# Patient Record
Sex: MALE | Race: BLACK OR AFRICAN AMERICAN | Employment: OTHER | URBAN - METROPOLITAN AREA
[De-identification: names, ages, dates, MRNs, and addresses within clinical notes are randomized per-mention and may not be internally consistent; named-entity substitution may affect disease eponyms.]

---

## 2017-09-19 ENCOUNTER — ED HISTORICAL/CONVERTED ENCOUNTER (OUTPATIENT)
Dept: OTHER | Age: 52
End: 2017-09-19

## 2019-02-18 ENCOUNTER — ED HISTORICAL/CONVERTED ENCOUNTER (OUTPATIENT)
Dept: OTHER | Age: 54
End: 2019-02-18

## 2019-05-23 ENCOUNTER — ED HISTORICAL/CONVERTED ENCOUNTER (OUTPATIENT)
Dept: OTHER | Age: 54
End: 2019-05-23

## 2019-06-14 ENCOUNTER — OP HISTORICAL/CONVERTED ENCOUNTER (OUTPATIENT)
Dept: OTHER | Age: 54
End: 2019-06-14

## 2019-06-18 ENCOUNTER — OP HISTORICAL/CONVERTED ENCOUNTER (OUTPATIENT)
Dept: OTHER | Age: 54
End: 2019-06-18

## 2020-02-04 ENCOUNTER — IP HISTORICAL/CONVERTED ENCOUNTER (OUTPATIENT)
Dept: OTHER | Age: 55
End: 2020-02-04

## 2020-04-04 ENCOUNTER — ED HISTORICAL/CONVERTED ENCOUNTER (OUTPATIENT)
Dept: OTHER | Age: 55
End: 2020-04-04

## 2020-06-17 LAB — HBA1C MFR BLD HPLC: 6.8 %

## 2020-07-04 ENCOUNTER — ED HISTORICAL/CONVERTED ENCOUNTER (OUTPATIENT)
Dept: OTHER | Age: 55
End: 2020-07-04

## 2020-07-04 LAB — CREATININE, EXTERNAL: 1.29

## 2020-07-15 VITALS
SYSTOLIC BLOOD PRESSURE: 142 MMHG | HEART RATE: 68 BPM | HEIGHT: 69 IN | OXYGEN SATURATION: 100 % | RESPIRATION RATE: 18 BRPM | BODY MASS INDEX: 36.14 KG/M2 | WEIGHT: 244 LBS | DIASTOLIC BLOOD PRESSURE: 90 MMHG | TEMPERATURE: 97.5 F

## 2020-07-15 PROBLEM — I70.209 ATHEROSCLEROSIS OF ARTERIES OF EXTREMITIES (HCC): Status: ACTIVE | Noted: 2020-07-15

## 2020-07-15 PROBLEM — R74.8 ELEVATED LIVER ENZYMES: Status: ACTIVE | Noted: 2020-07-15

## 2020-07-15 PROBLEM — E78.5 HYPERLIPIDEMIA: Status: ACTIVE | Noted: 2020-07-15

## 2020-07-15 PROBLEM — H26.9 CATARACT: Status: ACTIVE | Noted: 2020-07-15

## 2020-07-15 PROBLEM — M62.830 SPASM OF BACK MUSCLES: Status: ACTIVE | Noted: 2020-07-15

## 2020-07-15 PROBLEM — M79.661 PAIN IN BOTH LOWER LEGS: Status: ACTIVE | Noted: 2020-07-15

## 2020-07-15 PROBLEM — I25.10 CORONARY ARTERIOSCLEROSIS IN NATIVE ARTERY: Status: ACTIVE | Noted: 2020-07-15

## 2020-07-15 PROBLEM — E11.42 DIABETIC PERIPHERAL NEUROPATHY (HCC): Status: ACTIVE | Noted: 2020-07-15

## 2020-07-15 PROBLEM — I10 ESSENTIAL HYPERTENSION: Status: ACTIVE | Noted: 2020-07-15

## 2020-07-15 PROBLEM — E11.51 PERIPHERAL CIRCULATORY DISORDER ASSOCIATED WITH TYPE 2 DIABETES MELLITUS (HCC): Status: ACTIVE | Noted: 2020-07-15

## 2020-07-15 PROBLEM — M79.662 PAIN IN BOTH LOWER LEGS: Status: ACTIVE | Noted: 2020-07-15

## 2020-07-15 PROBLEM — R94.31 EKG, ABNORMAL: Status: ACTIVE | Noted: 2020-07-15

## 2020-07-15 PROBLEM — E11.41 DIABETIC MONONEUROPATHY ASSOCIATED WITH TYPE 2 DIABETES MELLITUS (HCC): Status: ACTIVE | Noted: 2020-07-15

## 2020-07-15 PROBLEM — L97.509 ULCER OF FOOT (HCC): Status: ACTIVE | Noted: 2020-07-15

## 2020-07-15 PROBLEM — E11.9 DIABETES MELLITUS TYPE 2, CONTROLLED (HCC): Status: ACTIVE | Noted: 2020-07-15

## 2020-07-15 PROBLEM — N52.9 ERECTILE DYSFUNCTION: Status: ACTIVE | Noted: 2020-07-15

## 2020-07-15 PROBLEM — I70.269: Status: ACTIVE | Noted: 2020-07-15

## 2020-07-15 PROBLEM — I77.9 PERIPHERAL ARTERIAL OCCLUSIVE DISEASE (HCC): Status: ACTIVE | Noted: 2020-07-15

## 2020-07-15 RX ORDER — ACETAMINOPHEN 160 MG/5ML
SUSPENSION, ORAL (FINAL DOSE FORM) ORAL
COMMUNITY
End: 2021-04-29 | Stop reason: ALTCHOICE

## 2020-07-15 RX ORDER — BLOOD SUGAR DIAGNOSTIC
STRIP MISCELLANEOUS
COMMUNITY
End: 2021-12-14 | Stop reason: SDUPTHER

## 2020-07-15 RX ORDER — LOSARTAN POTASSIUM 100 MG/1
100 TABLET ORAL DAILY
COMMUNITY
End: 2020-08-19 | Stop reason: SDUPTHER

## 2020-07-15 RX ORDER — INSULIN PUMP SYRINGE, 3 ML
EACH MISCELLANEOUS
COMMUNITY
End: 2020-08-10 | Stop reason: ALTCHOICE

## 2020-07-15 RX ORDER — PEN NEEDLE, DIABETIC 31 GX3/16"
NEEDLE, DISPOSABLE MISCELLANEOUS
COMMUNITY
End: 2020-08-25 | Stop reason: SDUPTHER

## 2020-07-15 RX ORDER — ISOPROPYL ALCOHOL 70 ML/100ML
SWAB TOPICAL
COMMUNITY
End: 2020-10-08 | Stop reason: ALTCHOICE

## 2020-07-15 RX ORDER — LABETALOL 100 MG/1
TABLET, FILM COATED ORAL 2 TIMES DAILY
COMMUNITY
End: 2020-09-18 | Stop reason: SDUPTHER

## 2020-07-15 RX ORDER — CLOPIDOGREL BISULFATE 75 MG/1
TABLET ORAL
COMMUNITY
End: 2020-08-31

## 2020-07-15 RX ORDER — KETOROLAC TROMETHAMINE 5 MG/ML
1 SOLUTION OPHTHALMIC 4 TIMES DAILY
COMMUNITY
End: 2020-10-08 | Stop reason: ALTCHOICE

## 2020-07-15 RX ORDER — PEN NEEDLE, DIABETIC 29 G X1/2"
NEEDLE, DISPOSABLE MISCELLANEOUS
COMMUNITY
End: 2020-08-25 | Stop reason: SDUPTHER

## 2020-07-15 RX ORDER — OFLOXACIN 3 MG/ML
3 SOLUTION/ DROPS OPHTHALMIC 4 TIMES DAILY
COMMUNITY
End: 2020-10-08 | Stop reason: ALTCHOICE

## 2020-07-15 RX ORDER — LANCETS 28 GAUGE
EACH MISCELLANEOUS
Status: ON HOLD | COMMUNITY
End: 2022-01-21 | Stop reason: SDUPTHER

## 2020-07-15 RX ORDER — BLOOD SUGAR DIAGNOSTIC
STRIP MISCELLANEOUS SEE ADMIN INSTRUCTIONS
COMMUNITY
End: 2020-08-10 | Stop reason: ALTCHOICE

## 2020-07-15 RX ORDER — LIDOCAINE 50 MG/G
PATCH TOPICAL EVERY 24 HOURS
COMMUNITY
End: 2020-10-08 | Stop reason: SDUPTHER

## 2020-07-15 RX ORDER — INSULIN GLARGINE 100 [IU]/ML
INJECTION, SOLUTION SUBCUTANEOUS
COMMUNITY
End: 2020-08-25 | Stop reason: SDUPTHER

## 2020-07-15 RX ORDER — CAPSAICIN 0.1 %
CREAM (GRAM) TOPICAL 3 TIMES DAILY
COMMUNITY
End: 2020-10-29 | Stop reason: ALTCHOICE

## 2020-07-15 RX ORDER — ASPIRIN 81 MG/1
TABLET ORAL DAILY
COMMUNITY
End: 2020-10-08 | Stop reason: SDUPTHER

## 2020-07-15 RX ORDER — LIRAGLUTIDE 6 MG/ML
0.6 INJECTION SUBCUTANEOUS
COMMUNITY
End: 2020-08-28

## 2020-07-15 RX ORDER — MENTHOL 5 %
ADHESIVE PATCH, MEDICATED TOPICAL
COMMUNITY
End: 2021-04-29 | Stop reason: ALTCHOICE

## 2020-07-15 RX ORDER — SILDENAFIL CITRATE 20 MG/1
20 TABLET ORAL 3 TIMES DAILY
COMMUNITY
End: 2021-05-27

## 2020-07-15 RX ORDER — OMEGA-3-ACID ETHYL ESTERS 1 G/1
2 CAPSULE, LIQUID FILLED ORAL 2 TIMES DAILY
COMMUNITY
End: 2020-12-31 | Stop reason: SDUPTHER

## 2020-07-15 RX ORDER — ESCITALOPRAM OXALATE 5 MG/1
TABLET ORAL DAILY
COMMUNITY
End: 2020-07-27 | Stop reason: DRUGHIGH

## 2020-07-15 RX ORDER — ATORVASTATIN CALCIUM 40 MG/1
TABLET, FILM COATED ORAL DAILY
COMMUNITY
End: 2020-09-18 | Stop reason: SDUPTHER

## 2020-07-27 ENCOUNTER — OFFICE VISIT (OUTPATIENT)
Dept: INTERNAL MEDICINE CLINIC | Age: 55
End: 2020-07-27
Payer: MEDICARE

## 2020-07-27 VITALS
OXYGEN SATURATION: 96 % | BODY MASS INDEX: 35.43 KG/M2 | DIASTOLIC BLOOD PRESSURE: 84 MMHG | WEIGHT: 239.2 LBS | HEART RATE: 71 BPM | HEIGHT: 69 IN | TEMPERATURE: 97.2 F | SYSTOLIC BLOOD PRESSURE: 127 MMHG

## 2020-07-27 DIAGNOSIS — M21.372 ACQUIRED LEFT FOOT DROP: ICD-10-CM

## 2020-07-27 DIAGNOSIS — E11.42 DIABETIC PERIPHERAL NEUROPATHY (HCC): ICD-10-CM

## 2020-07-27 DIAGNOSIS — E11.51 PERIPHERAL CIRCULATORY DISORDER ASSOCIATED WITH TYPE 2 DIABETES MELLITUS (HCC): ICD-10-CM

## 2020-07-27 DIAGNOSIS — F33.2 SEVERE EPISODE OF RECURRENT MAJOR DEPRESSIVE DISORDER, WITHOUT PSYCHOTIC FEATURES (HCC): ICD-10-CM

## 2020-07-27 DIAGNOSIS — E66.01 SEVERE OBESITY (HCC): ICD-10-CM

## 2020-07-27 DIAGNOSIS — I77.9 PERIPHERAL ARTERIAL OCCLUSIVE DISEASE (HCC): Primary | ICD-10-CM

## 2020-07-27 DIAGNOSIS — I10 ESSENTIAL HYPERTENSION: ICD-10-CM

## 2020-07-27 PROCEDURE — 99214 OFFICE O/P EST MOD 30 MIN: CPT | Performed by: INTERNAL MEDICINE

## 2020-07-27 RX ORDER — HYDROCHLOROTHIAZIDE 25 MG/1
25 TABLET ORAL DAILY
COMMUNITY
End: 2020-09-18 | Stop reason: SDUPTHER

## 2020-07-27 RX ORDER — ESCITALOPRAM OXALATE 10 MG/1
10 TABLET ORAL DAILY
Qty: 90 TAB | Refills: 0 | Status: SHIPPED | OUTPATIENT
Start: 2020-07-27 | End: 2020-10-08 | Stop reason: SDUPTHER

## 2020-07-27 NOTE — PATIENT INSTRUCTIONS
Online counseling: [employer]       Depression and Chronic Disease: Care Instructions  Your Care Instructions     A chronic disease is one that you have for a long time. Some chronic diseases can be controlled, but they usually cannot be cured. Depression is common in people with chronic diseases, but it often goes unnoticed. Many people have concerns about seeking treatment for a mental health problem. You may think it's a sign of weakness, or you don't want people to know about it. It's important to overcome these reasons for not seeking treatment. Treating depression or anxiety is good for your health. Follow-up care is a key part of your treatment and safety. Be sure to make and go to all appointments, and call your doctor if you are having problems. It's also a good idea to know your test results and keep a list of the medicines you take. How can you care for yourself at home? Watch for symptoms of depression  The symptoms of depression are often subtle at first. You may think they are caused by your disease rather than depression. Or you may think it is normal to be depressed when you have a chronic disease. If you are depressed you may:  · Feel sad or hopeless. · Feel guilty or worthless. · Not enjoy the things you used to enjoy. · Feel hopeless, as though life is not worth living. · Have trouble thinking or remembering. · Have low energy, and you may not eat or sleep well. · Pull away from others. · Think often about death or killing yourself. (Keep the numbers for these national suicide hotlines: 0-414-671-TALK [1-101.368.2548] and 5-470-UEOHBOH [1-442.142.6954]. )  Get treatment  By treating your depression, you can feel more hopeful and have more energy. If you feel better, you may take better care of yourself, so your health may improve. · Talk to your doctor if you have any changes in mood during treatment for your disease. · Ask your doctor for help.  Counseling, antidepressant medicine, or a combination of the two can help most people with depression. Often a combination works best. Counseling can also help you cope with having a chronic disease. When should you call for help? [id number] anytime you think you may need emergency care. For example, call if:  · You feel like hurting yourself or someone else. · Someone you know has depression and is about to attempt or is attempting suicide. Call your doctor now or seek immediate medical care if:  · You hear voices. · Someone you know has depression and:  ? Starts to give away his or her possessions. ? Uses illegal drugs or drinks alcohol heavily. ? Talks or writes about death, including writing suicide notes or talking about guns, knives, or pills. ? Starts to spend a lot of time alone. ? Acts very aggressively or suddenly appears calm. Watch closely for changes in your health, and be sure to contact your doctor if:  · You do not get better as expected. Where can you learn more? Go to http://danisha-nato.info/  Enter A548 in the search box to learn more about \"Depression and Chronic Disease: Care Instructions. \"  Current as of: January 31, 2020               Content Version: 12.5  © 2841-8638 Healthwise, [employer]. Care instructions adapted under license by [employer] (which disclaims liability or warranty for this information). If you have questions about a medical condition or this instruction, always ask your healthcare professional. [place] [address] any warranty or liability for your use of this information.

## 2020-07-27 NOTE — PROGRESS NOTES
Rajni Payan is a 62 y.o. male and presents with Diabetes; Depression; and Hypertension    Mr. Antonia Potter is here mainly today for evaluation of mobility required for him to be approved for a scooter to use at home, I have sent an order based on what he tells me he has difficulty with, he says that he is getting worse, he went to Neurology, but he was told there was not much alternatives to offer him. He says he does not feel his feet, he has to wear his shoes all the time inside the house, and it's difficult for him to walk from his bed to the bathroom or to the kitchen despite of using the walker because of the progressive lack of sensation in his feet and the severe pain he's on. He says lately he has opted for laying down most of the day because of pain and because he's scared of falling and he wants to be able to move around, be functional and do his ADLs. He says the right leg is the worse, the righ foot is numb all the time. He feels frequently a stabbing pain, has cramps often. He's using hsi rollator to help himself, sometimes he holds into the walls. He says he has to reschedule appointment with interventional pain management, he was seeing a specialist at [facility]. He saw them once before pandemic started. He does not want medications, he wants interventional pain management    For diabetes we will check A1c last month 6.8%, he has started efforts to lose weight again, since our last visit in June he has lost 5 pounds, we decided not to modify his medication treatment but to encourage him make efforts to lose weight so that we can get to a goal of 6.5%. For now neuropathy giving him all the symptoms above, he has tried gabapentin and pregabalin but he has not tolerated the side effects of either of them. He has seen physical medicine and rehabilitation neurology for this problem as well. There is no improvement.   He is currently on Victoza 1.8 mg/day, Jardiance 25 mg/day, Lantus 50 units at night, and Novolin and sliding scale 2:50:150. Today morning was 122, oscilating between 100 and 150. His blood pressure is well controlled at goal, taking his medications as indicated listed in the medication tab. For the peripheral vascular disease he is on dual antiplatelet, denies any bleeding, no active lesions or ulcers. He has intermittent claudication, he had right lower extremity stent in 2019. He has been recently hospitalized twice in [place] at [facility] for depression with suicidal ideation, is currently on Lexapro 5 mg/day. He decided to go on his own because of suicidal thoughts but no intent. He was using cocaine and he stopped back then in April. He feels Lexapro helps. He has not seen a psychiatrist        Review of Systems  Review of Systems   Constitutional: Negative for chills, fatigue, fever and unexpected weight change. HENT: Negative for congestion, ear pain, sneezing and sore throat. Eyes: Negative for pain and discharge. Respiratory: Negative for cough, shortness of breath and wheezing. Cardiovascular: Negative for chest pain, palpitations and leg swelling. Gastrointestinal: Negative for abdominal pain, blood in stool, constipation and diarrhea. Endocrine: Negative for polydipsia and polyuria. Genitourinary: Negative for difficulty urinating, dysuria, frequency, hematuria and urgency. Musculoskeletal: Positive for arthralgias, gait problem and myalgias. Negative for back pain and joint swelling. Skin: Negative for rash. Allergic/Immunologic: Negative for environmental allergies and food allergies. Neurological: Positive for weakness (Left foot drop) and numbness. Negative for dizziness, speech difficulty, light-headedness and headaches. Hematological: Negative for adenopathy. Psychiatric/Behavioral: Positive for behavioral problems (Depression).  Negative for self-injury, sleep disturbance and suicidal ideas. Past Medical History:   Diagnosis Date    Arteriosclerotic gangrene ([facility] 7/15/2020    Atherosclerosis of arteries of extremities ([facility] 7/15/2020    Cataract 7/15/2020    Coronary arteriosclerosis in native artery 7/15/2020    Diabetes mellitus type 2, controlled ([address] 7/15/2020    Diabetic mononeuropathy associated with type 2 diabetes mellitus ([address] 7/15/2020    Diabetic peripheral neuropathy ([facility] 7/15/2020    EKG, abnormal 7/15/2020    Elevated liver enzymes 7/15/2020    Erectile dysfunction 7/15/2020    Essential hypertension 7/15/2020    Hepatitis     High cholesterol     Hyperlipidemia 7/15/2020    Hypertension     Pain in both lower legs 7/15/2020    Peripheral arterial occlusive disease ([facility] 7/15/2020    Peripheral circulatory disorder associated with type 2 diabetes mellitus ([facility] 7/15/2020    Spasm of back muscles 7/15/2020    Ulcer of foot ([facility] 7/15/2020     Past Surgical History:   Procedure Laterality Date    IR STENT PLACEMENT      Right Leg     Social History     Socioeconomic History    Marital status: SINGLE     Spouse name: Not on file    Number of children: Not on file    Years of education: Not on file    Highest education level: Not on file   Tobacco Use    Smoking status: Never Smoker    Smokeless tobacco: Never Used   Substance and Sexual Activity    Alcohol use: Never     Frequency: Never     Family History   Problem Relation Age of Onset    Diabetes Mother     Heart Disease Mother     Diabetes Father     Diabetes Sister     Diabetes Brother      Current Outpatient Medications   Medication Sig Dispense Refill    hydroCHLOROthiazide (HYDRODIURIL) 25 mg tablet Take 25 mg by mouth daily.  escitalopram oxalate (LEXAPRO) 10 mg tablet Take 1 Tab by mouth daily for 90 days. Indications: major depressive disorder 90 Tab 0    atorvastatin (LIPITOR) 40 mg tablet Take  by mouth daily.  alcohol swabs padm by Apply Externally route.       clopidogreL (PLAVIX) 75 mg tab Take  by mouth.  Insulin Syringe-Needle U-100 0.3 mL 30 gauge x 5/16\" syrg by Does Not Apply route.  labetaloL (NORMODYNE) 100 mg tablet Take  by mouth two (2) times a day.  insulin glargine (Lantus Solostar U-100 Insulin) 100 unit/mL (3 mL) inpn by SubCUTAneous route.  lidocaine (LIDODERM) 5 % by TransDERmal route every twenty-four (24) hours. Apply patch to the affected area for 12 hours a day and remove for 12 hours a day.  losartan (COZAAR) 100 mg tablet Take 100 mg by mouth daily.  aspirin delayed-release (Aspirin Low Dose) 81 mg tablet Take  by mouth daily.  insulin regular (NovoLIN R Regular U-100 Insuln) 100 unit/mL injection by SubCUTAneous route.  omega-3 acid ethyl esters (LOVAZA) 1 gram capsule Take 2 g by mouth two (2) times a day.  Insulin Needles, Disposable, (Pen Needle) 32 gauge x 5/32\" ndle by Does Not Apply route.  Blood-Glucose Meter (Prodigy Autocode Meter) monitoring kit by Does Not Apply route.  glucose blood VI test strips (Prodigy No Coding) strip by Does Not Apply route See Admin Instructions.  lancets (Prodigy Twist Top Lancet) 28 gauge misc by Does Not Apply route.  sildenafiL, pulmonary hypertension, (REVATIO) 20 mg tablet Take 20 mg by mouth three (3) times daily.  Insulin Syringe-Needle U-100 (UltiCare) 0.3 mL 30 gauge x 1/2\" syrg by Does Not Apply route.  Insulin Needles, Disposable, (Ultracare Pen Needle) 32 gauge x 1/4\" ndle by Does Not Apply route.  liraglutide (Victoza 3-Alfredo) 0.6 mg/0.1 mL (18 mg/3 mL) pnij 0.6 mg by SubCUTAneous route.  blood pressure test kit (WRIST BLOOD PRESSURE MONITOR) by Does Not Apply route.  capsaicin (CAPZASIN-HP) 0.1 % topical cream Apply  to affected area three (3) times daily.  menthol (Cold and Hot Pain Relief) 5 % ptmd by Apply Externally route.  empagliflozin (Jardiance) 25 mg tablet Take  by mouth daily.       ketorolac (ACULAR) 0.5 % ophthalmic solution Administer 1 Drop to both eyes four (4) times daily.  benzocaine (HURRICANE) 20 % spra by Mucous Membrane route as needed.  ofloxacin (FLOXIN) 0.3 % ophthalmic solution Administer 3 Drops to both eyes four (4) times daily.  capsaicin (MUSCLE RELIEF EX) by Apply Externally route. No Known Allergies    Objective:  Visit Vitals  /84 (BP 1 Location: Right arm, BP Patient Position: Sitting)   Pulse 71   Temp 97.2 °F (36.2 °C) (Oral)   Ht 5' 9\" (1.753 m) Comment: Standing with colthes   Wt 239 lb 3.2 oz (108.5 kg)   SpO2 96% Comment: RA   BMI 35.32 kg/m²     Physical Exam:   Physical Exam  Vitals signs reviewed. Constitutional:       General: He is not in acute distress. Appearance: Normal appearance. He is obese. HENT:      Head: Normocephalic and atraumatic. Mouth/Throat:      Mouth: Mucous membranes are moist.   Eyes:      Extraocular Movements: Extraocular movements intact. Conjunctiva/sclera: Conjunctivae normal.      Pupils: Pupils are equal, round, and reactive to light. Neck:      Musculoskeletal: Normal range of motion and neck supple. Cardiovascular:      Rate and Rhythm: Normal rate and regular rhythm. Pulses: Normal pulses. Heart sounds: Normal heart sounds. Pulmonary:      Effort: Pulmonary effort is normal.      Breath sounds: Normal breath sounds. Abdominal:      General: Abdomen is flat. Bowel sounds are normal. There is no distension. Palpations: Abdomen is soft. There is no mass. Tenderness: There is no abdominal tenderness. Musculoskeletal:         General: Deformity (Left foot drop wearing brace) present. No swelling. Right lower leg: No edema. Left lower leg: No edema. Lymphadenopathy:      Cervical: No cervical adenopathy. Skin:     General: Skin is warm and dry. Capillary Refill: Capillary refill takes less than 2 seconds.       Coloration: Skin is not jaundiced or pale. Findings: No erythema or rash. Neurological:      Mental Status: He is alert and oriented to person, place, and time. Cranial Nerves: No cranial nerve deficit. Sensory: Sensory deficit (Decreased sensation to both light and deep stimulation in both lower extremities, worse in the left side.) present. Motor: Weakness (Left lower extremity, with limp and gait instability) present. Coordination: Coordination normal.      Gait: Gait abnormal (Left limp, wide-based, slow and tandem walk, without a walker has to grab the walls to keep himself stable). Deep Tendon Reflexes: Reflexes abnormal (1+ patellar left side). Psychiatric:         Mood and Affect: Mood normal.         Behavior: Behavior normal.         Thought Content: Thought content normal.         Judgment: Judgment normal.          Results for orders placed or performed in visit on 07/15/20   AMB EXT HGBA1C   Result Value Ref Range    Hemoglobin A1c, External 6.8        Assessment/Plan:    He has significant limitation for mobility secondary to peripheral arterial occlusive disease, peripheral neuropathy, left foot drop, which has significantly impacted his quality of life, has reduced his capacity of fulfill his ADLs as expected, he has opted for laying in bed more often now due to the difficulty moving around even with his Rollator. He is waiting for appointment with his neuropathy clinic to start interventional pain management treatment, he does not want any medications, I am ordering scooter again now that I was able to see him in the office and examine him. I believe been able to mobilize with the scooter is going to help him fulfill his ADLs like cooking at home or reaching the bathroom on time and be able to be more mobile around. His diabetes seems better controlled, will make any changes, continue Lantus 50 units at night, Jardiance and Victoza with no changes, and Humalog on sliding scale 2: 50: 150.   No need for labs right now last hemoglobin A1c a month ago as mentioned above. He has lost 5 pounds in the last visit encouraged to continue weight loss efforts which I explained him will help also with in some degree decreasing his pain and help with mobility  Hypertension is well controlled continue treatment without changes  He is depressed, no suicidal thoughts right now, increase Lexapro from 5 mg to 10 mg, he needs to see psychiatry and counseling. Referrals and recommendations given. ICD-10-CM ICD-9-CM    1. Peripheral arterial occlusive disease (HCC)  I77.9 444.22 AMB SUPPLY ORDER   2. Severe obesity ([facility]  E66.01 278.01    3. Peripheral circulatory disorder associated with type 2 diabetes mellitus (HCC)  E11.51 250.70 AMB SUPPLY ORDER   4. Diabetic peripheral neuropathy (HCC)  E11.42 250.60 AMB SUPPLY ORDER     357.2    5. Essential hypertension  I10 401.9    6. Severe episode of recurrent major depressive disorder, without psychotic features ([facility]  F33.2 296.33 REFERRAL TO PSYCHIATRY      escitalopram oxalate (LEXAPRO) 10 mg tablet   7. Acquired left foot drop  M21.372 736.79 AMB SUPPLY ORDER     Orders Placed This Encounter    AMB SUPPLY ORDER     Electrical scooter     Scheduling Instructions:      [facility]: [address] [place] [address], [address] [place] [address], Ph [phone], Fax [phone] TO PSYCHIATRY     Referral Priority:   Routine     Referral Type:   Behavioral Health     Referral Reason:   Specialty Services Required     Referred to Provider:   Marianela Blackburn MD     Requested Specialty:   Psychiatry     Number of Visits Requested:   1    hydroCHLOROthiazide (HYDRODIURIL) 25 mg tablet     Sig: Take 25 mg by mouth daily.  escitalopram oxalate (LEXAPRO) 10 mg tablet     Sig: Take 1 Tab by mouth daily for 90 days.  Indications: major depressive disorder     Dispense:  90 Tab     Refill:  0     lose weight, follow low fat diet, follow low salt diet  Patient Instructions Online counseling: [facility]       Depression and Chronic Disease: Care Instructions  Your Care Instructions     A chronic disease is one that you have for a long time. Some chronic diseases can be controlled, but they usually cannot be cured. Depression is common in people with chronic diseases, but it often goes unnoticed. Many people have concerns about seeking treatment for a mental health problem. You may think it's a sign of weakness, or you don't want people to know about it. It's important to overcome these reasons for not seeking treatment. Treating depression or anxiety is good for your health. Follow-up care is a key part of your treatment and safety. Be sure to make and go to all appointments, and call your doctor if you are having problems. It's also a good idea to know your test results and keep a list of the medicines you take. How can you care for yourself at home? Watch for symptoms of depression  The symptoms of depression are often subtle at first. You may think they are caused by your disease rather than depression. Or you may think it is normal to be depressed when you have a chronic disease. If you are depressed you may:  · Feel sad or hopeless. · Feel guilty or worthless. · Not enjoy the things you used to enjoy. · Feel hopeless, as though life is not worth living. · Have trouble thinking or remembering. · Have low energy, and you may not eat or sleep well. · Pull away from others. · Think often about death or killing yourself. (Keep the numbers for these national suicide hotlines: 3-275-868-TALK [1-371.427.4677] and 6-806-AKUOZZH [1-384.890.4804]. )  Get treatment  By treating your depression, you can feel more hopeful and have more energy. If you feel better, you may take better care of yourself, so your health may improve. · Talk to your doctor if you have any changes in mood during treatment for your disease. · Ask your doctor for help.  Counseling, antidepressant medicine, or a combination of the two can help most people with depression. Often a combination works best. Counseling can also help you cope with having a chronic disease. When should you call for help? [id number] anytime you think you may need emergency care. For example, call if:  · You feel like hurting yourself or someone else. · Someone you know has depression and is about to attempt or is attempting suicide. Call your doctor now or seek immediate medical care if:  · You hear voices. · Someone you know has depression and:  ? Starts to give away his or her possessions. ? Uses illegal drugs or drinks alcohol heavily. ? Talks or writes about death, including writing suicide notes or talking about guns, knives, or pills. ? Starts to spend a lot of time alone. ? Acts very aggressively or suddenly appears calm. Watch closely for changes in your health, and be sure to contact your doctor if:  · You do not get better as expected. Where can you learn more? Go to http://danisha-nato.info/  Enter A548 in the search box to learn more about \"Depression and Chronic Disease: Care Instructions. \"  Current as of: January 31, 2020               Content Version: 12.5  © 5607-4962 Healthwise, [employer]. Care instructions adapted under license by [employer]. (which disclaims liability or warranty for this information). If you have questions about a medical condition or this instruction, always ask your healthcare professional. [place] [address] any warranty or liability for your use of this information. Follow-up and Dispositions    · Return in about 2 months (around 9/27/2020), or Diabetes, HTN, neuropathy, for follow up 30 minutes .      [facility] to [facility], need to find the name from them of what they cover

## 2020-07-31 VITALS
TEMPERATURE: 97.5 F | HEIGHT: 69 IN | BODY MASS INDEX: 36.14 KG/M2 | HEART RATE: 68 BPM | SYSTOLIC BLOOD PRESSURE: 142 MMHG | WEIGHT: 244 LBS | DIASTOLIC BLOOD PRESSURE: 90 MMHG | OXYGEN SATURATION: 100 %

## 2020-08-10 ENCOUNTER — TELEPHONE (OUTPATIENT)
Dept: INTERNAL MEDICINE CLINIC | Age: 55
End: 2020-08-10

## 2020-08-10 DIAGNOSIS — E11.42 CONTROLLED TYPE 2 DIABETES MELLITUS WITH DIABETIC POLYNEUROPATHY, WITH LONG-TERM CURRENT USE OF INSULIN (HCC): Primary | ICD-10-CM

## 2020-08-10 DIAGNOSIS — Z79.4 CONTROLLED TYPE 2 DIABETES MELLITUS WITH DIABETIC POLYNEUROPATHY, WITH LONG-TERM CURRENT USE OF INSULIN (HCC): Primary | ICD-10-CM

## 2020-08-10 RX ORDER — BLOOD-GLUCOSE METER
EACH MISCELLANEOUS
Qty: 100 STRIP | Refills: 11 | Status: SHIPPED | OUTPATIENT
Start: 2020-08-10 | End: 2020-11-05

## 2020-08-10 RX ORDER — ISOPROPYL ALCOHOL 70 ML/100ML
SWAB TOPICAL
Qty: 100 PAD | Refills: 11 | Status: SHIPPED | OUTPATIENT
Start: 2020-08-10 | End: 2021-02-22 | Stop reason: SDUPTHER

## 2020-08-10 RX ORDER — BLOOD-GLUC,BP METER,ADULT CUFF
EACH MISCELLANEOUS
Qty: 1 DOSE PACK | Refills: 11 | Status: SHIPPED | OUTPATIENT
Start: 2020-08-10 | End: 2022-01-21

## 2020-08-10 RX ORDER — INSULIN PUMP SYRINGE, 3 ML
EACH MISCELLANEOUS
Qty: 1 KIT | Refills: 0 | Status: SHIPPED | OUTPATIENT
Start: 2020-08-10 | End: 2021-06-17

## 2020-08-10 NOTE — TELEPHONE ENCOUNTER
Radhames called the insurance company and they said that the talking meter is called Dwight Soto this is a talking meter. Can you order this, his strips, lancets, control solution, battery, alcohol swabs.

## 2020-08-10 NOTE — TELEPHONE ENCOUNTER
ICD-10-CM ICD-9-CM    1.  Controlled type 2 diabetes mellitus with diabetic polyneuropathy, with long-term current use of insulin ([facility])  E11.42 250.60 Blood-Glucose Meter (Fora V12 Blood Glucose System) monitoring kit    Z79.4 357.2 glucose blood VI test strips (Fora V12 Glucose) strip     V58.67 lancets-blood glucose strips ([employer]a [id number] strp-lnct) 30 gauge cmpk      alcohol swabs (Alcohol Pads) padm

## 2020-08-11 ENCOUNTER — OFFICE VISIT (OUTPATIENT)
Dept: PODIATRY | Age: 55
End: 2020-08-11
Payer: MEDICARE

## 2020-08-11 VITALS
BODY MASS INDEX: 35.34 KG/M2 | HEIGHT: 69 IN | TEMPERATURE: 97.5 F | WEIGHT: 238.6 LBS | OXYGEN SATURATION: 95 % | SYSTOLIC BLOOD PRESSURE: 121 MMHG | HEART RATE: 72 BPM | DIASTOLIC BLOOD PRESSURE: 78 MMHG

## 2020-08-11 DIAGNOSIS — Z79.4 CONTROLLED TYPE 2 DIABETES MELLITUS WITH DIABETIC POLYNEUROPATHY, WITH LONG-TERM CURRENT USE OF INSULIN (HCC): Primary | ICD-10-CM

## 2020-08-11 DIAGNOSIS — E11.42 DIABETIC PERIPHERAL NEUROPATHY (HCC): ICD-10-CM

## 2020-08-11 DIAGNOSIS — M72.2 PLANTAR FASCIITIS OF RIGHT FOOT: ICD-10-CM

## 2020-08-11 DIAGNOSIS — B35.1 ONYCHOMYCOSIS: ICD-10-CM

## 2020-08-11 DIAGNOSIS — E11.42 CONTROLLED TYPE 2 DIABETES MELLITUS WITH DIABETIC POLYNEUROPATHY, WITH LONG-TERM CURRENT USE OF INSULIN (HCC): Primary | ICD-10-CM

## 2020-08-11 PROCEDURE — 20550 NJX 1 TENDON SHEATH/LIGAMENT: CPT | Performed by: PODIATRIST

## 2020-08-18 ENCOUNTER — TELEPHONE (OUTPATIENT)
Dept: INTERNAL MEDICINE CLINIC | Age: 55
End: 2020-08-18

## 2020-08-18 DIAGNOSIS — I10 ESSENTIAL HYPERTENSION: Primary | ICD-10-CM

## 2020-08-18 NOTE — TELEPHONE ENCOUNTER
Patient called to get a refill on your Losartin Potassium 100mg sent to [address] [place] [address] in [place] - Patient is out.

## 2020-08-19 RX ORDER — BETAMETHASONE SODIUM PHOSPHATE AND BETAMETHASONE ACETATE 3; 3 MG/ML; MG/ML
500 INJECTION, SUSPENSION INTRA-ARTICULAR; INTRALESIONAL; INTRAMUSCULAR; SOFT TISSUE ONCE
Status: SHIPPED | OUTPATIENT
Start: 2020-08-19 | End: 2020-08-20

## 2020-08-19 RX ORDER — LOSARTAN POTASSIUM 100 MG/1
100 TABLET ORAL DAILY
Qty: 90 TAB | Refills: 2 | Status: SHIPPED | OUTPATIENT
Start: 2020-08-19 | End: 2020-09-18 | Stop reason: SDUPTHER

## 2020-08-19 RX ORDER — LIDOCAINE HYDROCHLORIDE 10 MG/ML
1 INJECTION INFILTRATION; PERINEURAL ONCE
Status: SHIPPED | OUTPATIENT
Start: 2020-08-19 | End: 2020-08-20

## 2020-08-19 NOTE — PROGRESS NOTES
HISTORY OF PRESENT ILLNESS  Alireza Arellano is a 62 y.o. male is being seen in the office as a returning patient needing a diabetic foot exam, complaining of thick/discolored toenails and complaining of heel pain. Patient states he has been a diabetic for many years his most recent hemoglobin A1c was 6.9. He states he has close follow-up with his PCP. He denies any recent trauma but states he is having right heel pain. He states the pain rises to the level of 6 out of 10 and is worse with his first step in the morning. Patient also states his toenails are thick/discolored but denies having testing performed to confirm a diagnosis. He denies any systemic signs of infection. He denies any other lower extremity complaints    Review of Systems   Constitutional: Negative. HENT: Negative. Eyes: Positive for blurred vision. Respiratory: Negative. Cardiovascular: Negative. Gastrointestinal: Negative. Genitourinary: Negative. Musculoskeletal: Positive for back pain. Skin: Negative. Neurological: Positive for sensory change. Endo/Heme/Allergies: Negative. Psychiatric/Behavioral: Negative. All other systems reviewed and are negative.     Past Medical History:   Diagnosis Date    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    Atherosclerosis of arteries of extremities ([address] 7/15/2020    Cataract 7/15/2020    Coronary arteriosclerosis in native artery 7/15/2020    Diabetes mellitus type 2, controlled ([address] 7/15/2020    Diabetic mononeuropathy associated with type 2 diabetes mellitus ([address] 7/15/2020    Diabetic peripheral neuropathy ([address] 7/15/2020    EKG, abnormal 7/15/2020    Elevated liver enzymes 7/15/2020    Erectile dysfunction 7/15/2020    Essential hypertension 7/15/2020    Hepatitis     High cholesterol     Hyperlipidemia 7/15/2020    Hypertension     Pain in both lower legs 7/15/2020    Peripheral arterial occlusive disease ([address] 7/15/2020    Peripheral circulatory disorder associated with type 2 diabetes mellitus ([email or web address] 7/15/2020    Spasm of back muscles 7/15/2020    Ulcer of foot ([facility] 7/15/2020     Family History   Problem Relation Age of Onset    Diabetes Mother     Heart Disease Mother     Diabetes Father     Diabetes Sister     Diabetes Brother      Past Surgical History:   Procedure Laterality Date    IR STENT PLACEMENT      Right Leg     Social History     Socioeconomic History    Marital status: SINGLE     Spouse name: Not on file    Number of children: Not on file    Years of education: Not on file    Highest education level: Not on file   Occupational History    Not on file   Social Needs    Financial resource strain: Not on file    Food insecurity     Worry: Not on file     Inability: Not on file    Transportation needs     Medical: Not on file     Non-medical: Not on file   Tobacco Use    Smoking status: Never Smoker    Smokeless tobacco: Never Used   Substance and Sexual Activity    Alcohol use: Never     Frequency: Never    Drug use: Not on file    Sexual activity: Not on file   Lifestyle    Physical activity     Days per week: Not on file     Minutes per session: Not on file    Stress: Not on file   Relationships    Social connections     Talks on phone: Not on file     Gets together: Not on file     Attends Synagogue service: Not on file     Active member of club or organization: Not on file     Attends meetings of clubs or organizations: Not on file     Relationship status: Not on file    Intimate partner violence     Fear of current or ex partner: Not on file     Emotionally abused: Not on file     Physically abused: Not on file     Forced sexual activity: Not on file   Other Topics Concern    Not on file   Social History Narrative    Not on file     Current Outpatient Medications   Medication Instructions    alcohol swabs (Alcohol Pads) padm Use to check glucose 3 times a day before meals    alcohol swabs padm Apply Externally  aspirin delayed-release (Aspirin Low Dose) 81 mg tablet Oral, DAILY    atorvastatin (LIPITOR) 40 mg tablet Oral, DAILY    benzocaine (HURRICANE) 20 % spra Mucous Membrane, AS NEEDED    blood pressure test kit (WRIST BLOOD PRESSURE MONITOR) Does Not Apply    Blood-Glucose Meter (Fora V12 Blood Glucose System) monitoring kit Use to check glucose 3 times a day before meals    capsaicin (CAPZASIN-HP) 0.1 % topical cream Topical, 3 TIMES DAILY    capsaicin (MUSCLE RELIEF EX) Apply Externally    clopidogreL (PLAVIX) 75 mg tab Oral    empagliflozin (Jardiance) 25 mg tablet Oral, DAILY    escitalopram oxalate (LEXAPRO) 10 mg, Oral, DAILY    glucose blood VI test strips (Fora V12 Glucose) strip Use to check glucose 3 times a day before meals    hydroCHLOROthiazide (HYDRODIURIL) 25 mg, Oral, DAILY    insulin glargine (Lantus Solostar U-100 Insulin) 100 unit/mL (3 mL) inpn SubCUTAneous    Insulin Needles, Disposable, (Pen Needle) 32 gauge x 5/32\" ndle Does Not Apply    Insulin Needles, Disposable, (Ultracare Pen Needle) 32 gauge x 1/4\" ndle Does Not Apply    insulin regular (NovoLIN R Regular U-100 Insuln) 100 unit/mL injection SubCUTAneous    Insulin Syringe-Needle U-100 (UltiCare) 0.3 mL 30 gauge x 1/2\" syrg Does Not Apply    Insulin Syringe-Needle U-100 0.3 mL 30 gauge x 5/16\" syrg Does Not Apply    ketorolac (ACULAR) 0.5 % ophthalmic solution 1 Drop, Both Eyes, 4 TIMES DAILY    labetaloL (NORMODYNE) 100 mg tablet Oral, 2 TIMES DAILY    lancets (Prodigy Twist Top Lancet) 28 gauge misc Does Not Apply    lancets-blood glucose strips (Fora [id number] strp-lnct) 30 gauge cmpk Use to check glucose 3 times a day before meals    lidocaine (LIDODERM) 5 % TransDERmal, EVERY 24 HOURS, Apply patch to the affected area for 12 hours a day and remove for 12 hours a day.      losartan (COZAAR) 100 mg, Oral, DAILY    menthol (Cold and Hot Pain Relief) 5 % ptmd Apply Externally    ofloxacin (FLOXIN) 0.3 % ophthalmic solution 3 Drops, Both Eyes, 4 TIMES DAILY    omega-3 acid ethyl esters (LOVAZA) 2 g, Oral, 2 TIMES DAILY    sildenafiL (pulmonary hypertension) (REVATIO) 20 mg, Oral, 3 TIMES DAILY    Victoza 3-Alfredo 0.6 mg, SubCUTAneous     No Known Allergies    Visit Vitals  /78 (BP 1 Location: Left arm, BP Patient Position: Sitting)   Pulse 72   Temp 97.5 °F (36.4 °C) (Temporal)   Ht 5' 9\" (1.753 m)   Wt 238 lb 9.6 oz (108.2 kg)   SpO2 95%   BMI 35.24 kg/m²     Physical Exam  Vitals signs reviewed. Constitutional:       Appearance: He is morbidly obese. HENT:      Head:      Comments: Normal dentition  Cardiovascular:      Pulses:           Dorsalis pedis pulses are 1+ on the right side and 1+ on the left side. Posterior tibial pulses are 1+ on the right side and 1+ on the left side. Pulmonary:      Effort: Pulmonary effort is normal.   Musculoskeletal:      Right ankle: He exhibits decreased range of motion. Achilles tendon exhibits no pain and no defect. Left ankle: He exhibits decreased range of motion. Achilles tendon exhibits no pain and no defect. Right lower leg: Edema present. Left lower leg: Edema present. Right foot: Normal range of motion. No deformity, bunion or Charcot foot. Left foot: Normal range of motion. No deformity, bunion or Charcot foot. Feet:      Right foot:      Protective Sensation: 10 sites tested. 10 sites sensed. Skin integrity: Skin integrity normal.      Toenail Condition: Right toenails are abnormally thick. Left foot:      Protective Sensation: 10 sites tested. 10 sites sensed. Skin integrity: Skin integrity normal.      Toenail Condition: Left toenails are abnormally thick. Skin:     General: Skin is warm. Capillary Refill: Capillary refill takes 2 to 3 seconds. Neurological:      Mental Status: He is alert and oriented to person, place, and time.    Psychiatric:         Mood and Affect: Mood and affect normal. Behavior: Behavior is cooperative. ASSESSMENT and PLAN    ICD-10-CM ICD-9-CM    1. Controlled type 2 diabetes mellitus with diabetic polyneuropathy, with long-term current use of insulin ([facility])  E11.42 250.60     Z79.4 357.2      V58.67    2. Diabetic peripheral neuropathy ([facility])  E11.42 250.60      357.2    3. Onychomycosis  B35.1 110.1    4. Plantar fasciitis of right foot  M72.2 728.71 lidocaine (XYLOCAINE) 10 mg/mL (1 %) injection 1 mL      betamethasone (CELESTONE) injection 500 mg       Discussed and educated patient regarding his current medical condition  Instructed patient to monitor their feet daily, be compliant with all medications and wear supportive shoe gear. Discussed conservative versus invasive treatment options for his right heel pain, patient elected for a trigger point injection today in the office. The injection was performed with 1 cc of lidocaine and 1 cc of betamethasone to the point of maximum tenderness to his right plantar heel.   Patient tolerated well and no dressing was needed  Patient to limit ambulation for the next few days and call the office immediately if condition worsens

## 2020-08-24 ENCOUNTER — TELEPHONE (OUTPATIENT)
Dept: INTERNAL MEDICINE CLINIC | Age: 55
End: 2020-08-24

## 2020-08-24 DIAGNOSIS — E11.42 CONTROLLED TYPE 2 DIABETES MELLITUS WITH DIABETIC POLYNEUROPATHY, WITH LONG-TERM CURRENT USE OF INSULIN (HCC): Primary | ICD-10-CM

## 2020-08-24 DIAGNOSIS — Z79.4 CONTROLLED TYPE 2 DIABETES MELLITUS WITH DIABETIC POLYNEUROPATHY, WITH LONG-TERM CURRENT USE OF INSULIN (HCC): Primary | ICD-10-CM

## 2020-08-24 NOTE — TELEPHONE ENCOUNTER
Patient called requesting a refill on his ALL his insulin (3) to be sent to [facility].

## 2020-08-25 RX ORDER — PEN NEEDLE, DIABETIC 29 G X1/2"
NEEDLE, DISPOSABLE MISCELLANEOUS
Qty: 100 SYRINGE | Refills: 11 | Status: ON HOLD | OUTPATIENT
Start: 2020-08-25 | End: 2022-01-21 | Stop reason: SDUPTHER

## 2020-08-25 RX ORDER — INSULIN GLARGINE 100 [IU]/ML
50 INJECTION, SOLUTION SUBCUTANEOUS
Qty: 45 ML | Refills: 1 | Status: SHIPPED | OUTPATIENT
Start: 2020-08-25 | End: 2020-09-18 | Stop reason: SDUPTHER

## 2020-08-25 RX ORDER — PEN NEEDLE, DIABETIC 31 GX3/16"
NEEDLE, DISPOSABLE MISCELLANEOUS
Qty: 50 PEN NEEDLE | Refills: 11 | Status: SHIPPED | OUTPATIENT
Start: 2020-08-25 | End: 2021-02-04 | Stop reason: SDUPTHER

## 2020-08-28 RX ORDER — LIRAGLUTIDE 6 MG/ML
INJECTION SUBCUTANEOUS
Qty: 9 ML | Refills: 3 | Status: SHIPPED | OUTPATIENT
Start: 2020-08-28 | End: 2020-09-18 | Stop reason: SDUPTHER

## 2020-09-18 ENCOUNTER — TELEPHONE (OUTPATIENT)
Dept: INTERNAL MEDICINE CLINIC | Age: 55
End: 2020-09-18

## 2020-09-18 DIAGNOSIS — Z79.4 CONTROLLED TYPE 2 DIABETES MELLITUS WITH DIABETIC POLYNEUROPATHY, WITH LONG-TERM CURRENT USE OF INSULIN (HCC): ICD-10-CM

## 2020-09-18 DIAGNOSIS — I10 ESSENTIAL HYPERTENSION: ICD-10-CM

## 2020-09-18 DIAGNOSIS — E11.42 CONTROLLED TYPE 2 DIABETES MELLITUS WITH DIABETIC POLYNEUROPATHY, WITH LONG-TERM CURRENT USE OF INSULIN (HCC): ICD-10-CM

## 2020-09-18 RX ORDER — LOSARTAN POTASSIUM 100 MG/1
100 TABLET ORAL DAILY
Qty: 90 TAB | Refills: 2 | Status: SHIPPED | OUTPATIENT
Start: 2020-09-18 | End: 2020-10-08 | Stop reason: SDUPTHER

## 2020-09-18 RX ORDER — ATORVASTATIN CALCIUM 40 MG/1
40 TABLET, FILM COATED ORAL DAILY
Qty: 90 TAB | Refills: 2 | Status: SHIPPED | OUTPATIENT
Start: 2020-09-18 | End: 2020-10-08 | Stop reason: SDUPTHER

## 2020-09-18 RX ORDER — INSULIN GLARGINE 100 [IU]/ML
50 INJECTION, SOLUTION SUBCUTANEOUS
Qty: 45 ML | Refills: 1 | Status: SHIPPED | OUTPATIENT
Start: 2020-09-18 | End: 2020-10-08 | Stop reason: SDUPTHER

## 2020-09-18 RX ORDER — LIRAGLUTIDE 6 MG/ML
1.8 INJECTION SUBCUTANEOUS DAILY
Qty: 162 MG | Refills: 2 | Status: SHIPPED | OUTPATIENT
Start: 2020-09-18 | End: 2020-10-08 | Stop reason: SDUPTHER

## 2020-09-18 RX ORDER — INSULIN ASPART 100 [IU]/ML
INJECTION, SOLUTION INTRAVENOUS; SUBCUTANEOUS
Qty: 9 ML | Refills: 2 | Status: SHIPPED | OUTPATIENT
Start: 2020-09-18 | End: 2020-09-21 | Stop reason: SDUPTHER

## 2020-09-18 RX ORDER — LABETALOL 100 MG/1
100 TABLET, FILM COATED ORAL 2 TIMES DAILY
Qty: 180 TAB | Refills: 2 | Status: SHIPPED | OUTPATIENT
Start: 2020-09-18 | End: 2021-07-02

## 2020-09-18 RX ORDER — HYDROCHLOROTHIAZIDE 25 MG/1
25 TABLET ORAL DAILY
Qty: 90 TAB | Refills: 2 | Status: SHIPPED | OUTPATIENT
Start: 2020-09-18 | End: 2020-10-08 | Stop reason: SDUPTHER

## 2020-09-18 NOTE — TELEPHONE ENCOUNTER
Needs refills on    -Atrovastatin    -HCTZ    -Novolog    -Victoza    -Lantus Pen    Plavix    -losartan     -labetalol   Please send to [address] fax number [phone]

## 2020-09-21 ENCOUNTER — TELEPHONE (OUTPATIENT)
Dept: INTERNAL MEDICINE CLINIC | Age: 55
End: 2020-09-21

## 2020-09-21 DIAGNOSIS — Z79.4 CONTROLLED TYPE 2 DIABETES MELLITUS WITH DIABETIC POLYNEUROPATHY, WITH LONG-TERM CURRENT USE OF INSULIN (HCC): ICD-10-CM

## 2020-09-21 DIAGNOSIS — E11.42 CONTROLLED TYPE 2 DIABETES MELLITUS WITH DIABETIC POLYNEUROPATHY, WITH LONG-TERM CURRENT USE OF INSULIN (HCC): ICD-10-CM

## 2020-09-21 RX ORDER — INSULIN ASPART 100 [IU]/ML
INJECTION, SOLUTION INTRAVENOUS; SUBCUTANEOUS
Qty: 9 ML | Refills: 2 | Status: SHIPPED | OUTPATIENT
Start: 2020-09-21 | End: 2020-10-08 | Stop reason: SDUPTHER

## 2020-10-08 ENCOUNTER — OFFICE VISIT (OUTPATIENT)
Dept: INTERNAL MEDICINE CLINIC | Age: 55
End: 2020-10-08
Payer: MEDICARE

## 2020-10-08 VITALS
TEMPERATURE: 98.8 F | SYSTOLIC BLOOD PRESSURE: 140 MMHG | HEART RATE: 70 BPM | BODY MASS INDEX: 35.96 KG/M2 | HEIGHT: 69 IN | DIASTOLIC BLOOD PRESSURE: 88 MMHG | OXYGEN SATURATION: 94 % | WEIGHT: 242.8 LBS

## 2020-10-08 DIAGNOSIS — I10 ESSENTIAL HYPERTENSION: ICD-10-CM

## 2020-10-08 DIAGNOSIS — E11.42 CONTROLLED TYPE 2 DIABETES MELLITUS WITH DIABETIC POLYNEUROPATHY, WITH LONG-TERM CURRENT USE OF INSULIN (HCC): ICD-10-CM

## 2020-10-08 DIAGNOSIS — F33.2 SEVERE EPISODE OF RECURRENT MAJOR DEPRESSIVE DISORDER, WITHOUT PSYCHOTIC FEATURES (HCC): ICD-10-CM

## 2020-10-08 DIAGNOSIS — Z79.4 CONTROLLED TYPE 2 DIABETES MELLITUS WITH DIABETIC POLYNEUROPATHY, WITH LONG-TERM CURRENT USE OF INSULIN (HCC): ICD-10-CM

## 2020-10-08 DIAGNOSIS — E66.01 SEVERE OBESITY (BMI 35.0-35.9 WITH COMORBIDITY) (HCC): ICD-10-CM

## 2020-10-08 DIAGNOSIS — I77.9 PERIPHERAL ARTERIAL OCCLUSIVE DISEASE (HCC): ICD-10-CM

## 2020-10-08 DIAGNOSIS — E11.42 DIABETIC PERIPHERAL NEUROPATHY (HCC): Primary | ICD-10-CM

## 2020-10-08 LAB — HBA1C MFR BLD HPLC: 7.8 %

## 2020-10-08 PROCEDURE — 99214 OFFICE O/P EST MOD 30 MIN: CPT | Performed by: INTERNAL MEDICINE

## 2020-10-08 PROCEDURE — 83036 HEMOGLOBIN GLYCOSYLATED A1C: CPT | Performed by: INTERNAL MEDICINE

## 2020-10-08 RX ORDER — HYDROCHLOROTHIAZIDE 25 MG/1
25 TABLET ORAL DAILY
Qty: 90 TAB | Refills: 2 | Status: SHIPPED | OUTPATIENT
Start: 2020-10-08 | End: 2021-11-19

## 2020-10-08 RX ORDER — INSULIN GLARGINE 100 [IU]/ML
55 INJECTION, SOLUTION SUBCUTANEOUS
Qty: 49.5 ML | Refills: 2 | Status: SHIPPED | OUTPATIENT
Start: 2020-10-08 | End: 2021-03-11 | Stop reason: SDUPTHER

## 2020-10-08 RX ORDER — LIRAGLUTIDE 6 MG/ML
1.8 INJECTION SUBCUTANEOUS DAILY
Qty: 162 MG | Refills: 2 | Status: SHIPPED | OUTPATIENT
Start: 2020-10-08 | End: 2022-05-12 | Stop reason: SDUPTHER

## 2020-10-08 RX ORDER — ASPIRIN 81 MG/1
81 TABLET ORAL DAILY
Qty: 90 TAB | Refills: 2 | Status: SHIPPED | OUTPATIENT
Start: 2020-10-08 | End: 2021-07-05

## 2020-10-08 RX ORDER — ESCITALOPRAM OXALATE 10 MG/1
10 TABLET ORAL DAILY
Qty: 90 TAB | Refills: 2 | Status: SHIPPED | OUTPATIENT
Start: 2020-10-08 | End: 2020-10-29 | Stop reason: ALTCHOICE

## 2020-10-08 RX ORDER — INSULIN ASPART 100 [IU]/ML
8 INJECTION, SOLUTION INTRAVENOUS; SUBCUTANEOUS
Qty: 21.6 ML | Refills: 2 | Status: SHIPPED | OUTPATIENT
Start: 2020-10-08 | End: 2020-10-29 | Stop reason: SDUPTHER

## 2020-10-08 RX ORDER — INSULIN GLARGINE 100 [IU]/ML
50 INJECTION, SOLUTION SUBCUTANEOUS
Qty: 45 ML | Refills: 2 | Status: SHIPPED | OUTPATIENT
Start: 2020-10-08 | End: 2020-10-08 | Stop reason: SDUPTHER

## 2020-10-08 RX ORDER — LIDOCAINE 50 MG/G
1 PATCH TOPICAL EVERY 24 HOURS
Qty: 1 EACH | Refills: 2 | Status: SHIPPED | OUTPATIENT
Start: 2020-10-08 | End: 2022-02-02

## 2020-10-08 RX ORDER — LOSARTAN POTASSIUM 100 MG/1
100 TABLET ORAL DAILY
Qty: 90 TAB | Refills: 2 | Status: SHIPPED | OUTPATIENT
Start: 2020-10-08 | End: 2021-12-08 | Stop reason: SDUPTHER

## 2020-10-08 RX ORDER — CLOPIDOGREL BISULFATE 75 MG/1
75 TABLET ORAL DAILY
Qty: 90 TAB | Refills: 2 | Status: SHIPPED | OUTPATIENT
Start: 2020-10-08 | End: 2021-12-09

## 2020-10-08 RX ORDER — ATORVASTATIN CALCIUM 40 MG/1
40 TABLET, FILM COATED ORAL DAILY
Qty: 90 TAB | Refills: 2 | Status: SHIPPED | OUTPATIENT
Start: 2020-10-08 | End: 2021-07-05

## 2020-10-08 NOTE — PROGRESS NOTES
Evelia Degroot is a 62 y.o. male and presents with Diabetes and Hypertension    Mr. Karyn Larios is here for follow up     DM with polyneuropathy, controlled: Last A1C from June 6.8%, his glucose this morning was 123, he's compliant with diet,can't walk too much because of polyneuropathy, since he's able to walk, he was denied scooter by  Medicare. Last labs done in June, no microalbuminuria, GFR  >60, creat 1.29, total choleserol 201, tgs 174, ldl 129. Has lost 2 lbs since last visit. He's on Lantus 50 U bedtime, novolog sliding scale 2:50:150 and 5 U premeal and he has been using around 10 additional units depending on what he eats, after which it goes down to the low 100s, jardiance and victoza. He did not bring his glucometer. His pain is 6 to 8/10, the main issue is the numbness in his feet, he needs a refill on his diabetic shoes. HTN: Today little elevated, he's taking his medication, no issues, no chest pain, shortness of breath, palpitations       Review of Systems  Review of Systems   Constitutional: Negative for chills, fatigue, fever and unexpected weight change. HENT: Negative for congestion, ear pain, sneezing and sore throat. Eyes: Negative for pain and discharge. Respiratory: Negative for cough, shortness of breath and wheezing. Cardiovascular: Negative for chest pain, palpitations and leg swelling. Gastrointestinal: Negative for abdominal pain, blood in stool, constipation and diarrhea. Endocrine: Negative for polydipsia and polyuria. Genitourinary: Negative for difficulty urinating, dysuria, frequency, hematuria and urgency. Musculoskeletal: Negative for arthralgias, back pain and joint swelling. Skin: Negative for rash. Allergic/Immunologic: Negative for environmental allergies and food allergies. Neurological: Negative for dizziness, speech difficulty, weakness, light-headedness, numbness and headaches. Hematological: Negative for adenopathy.    Psychiatric/Behavioral: Negative for behavioral problems (Depression), sleep disturbance and suicidal ideas. Past Medical History:   Diagnosis Date    Arteriosclerotic gangrene ([facility] 7/15/2020    Atherosclerosis of arteries of extremities ([address] 7/15/2020    Cataract 7/15/2020    Coronary arteriosclerosis in native artery 7/15/2020    Diabetes mellitus type 2, controlled ([address] 7/15/2020    Diabetic mononeuropathy associated with type 2 diabetes mellitus ([address] 7/15/2020    Diabetic peripheral neuropathy ([address] 7/15/2020    EKG, abnormal 7/15/2020    Elevated liver enzymes 7/15/2020    Erectile dysfunction 7/15/2020    Essential hypertension 7/15/2020    Hepatitis     High cholesterol     Hyperlipidemia 7/15/2020    Hypertension     Pain in both lower legs 7/15/2020    Peripheral arterial occlusive disease ([address] 7/15/2020    Peripheral circulatory disorder associated with type 2 diabetes mellitus ([address] 7/15/2020    Spasm of back muscles 7/15/2020    Ulcer of foot ([facility] 7/15/2020     Past Surgical History:   Procedure Laterality Date    IR STENT PLACEMENT      Right Leg     Social History     Socioeconomic History    Marital status: SINGLE     Spouse name: Not on file    Number of children: Not on file    Years of education: Not on file    Highest education level: Not on file   Tobacco Use    Smoking status: Never Smoker    Smokeless tobacco: Never Used   Substance and Sexual Activity    Alcohol use: Never     Frequency: Never    Drug use: Never     Family History   Problem Relation Age of Onset    Diabetes Mother     Heart Disease Mother     Diabetes Father     Diabetes Sister     Diabetes Brother      Current Outpatient Medications   Medication Sig Dispense Refill    aspirin delayed-release (Aspirin Low Dose) 81 mg tablet Take 1 Tab by mouth daily for 270 days. 90 Tab 2    atorvastatin (LIPITOR) 40 mg tablet Take 1 Tab by mouth daily for 270 days.  90 Tab 2    clopidogreL (PLAVIX) 75 mg tab Take 1 Tab by mouth daily for 270 days. 90 Tab 2    empagliflozin (Jardiance) 25 mg tablet Take 1 Tab by mouth daily for 270 days. 90 Tab 2    escitalopram oxalate (LEXAPRO) 10 mg tablet Take 1 Tab by mouth daily for 90 days. Indications: major depressive disorder 90 Tab 2    hydroCHLOROthiazide (HYDRODIURIL) 25 mg tablet Take 1 Tab by mouth daily for 270 days. 90 Tab 2    insulin aspart U-100 (NOVOLOG) 100 unit/mL (3 mL) inpn 8 Units by SubCUTAneous route Before breakfast, lunch, and dinner for 270 days. Use to inject 3 times a day before meals as needed according to sliding scale of 2 units per 50 units above 150 mg/dl, maximum daily dose 36 units. 21.6 mL 2    losartan (COZAAR) 100 mg tablet Take 1 Tab by mouth daily for 270 days. 90 Tab 2    liraglutide (Victoza 3-Alfredo) 0.6 mg/0.1 mL (18 mg/3 mL) pnij 1.8 mg by SubCUTAneous route daily for 270 days. 162 mg 2    lidocaine (LIDODERM) 5 % 1 Patch by TransDERmal route every twenty-four (24) hours for 90 days. Apply patch to the affected area for 12 hours a day and remove for 12 hours a day. 1 Each 2    insulin glargine (Lantus Solostar U-100 Insulin) 100 unit/mL (3 mL) inpn 55 Units by SubCUTAneous route nightly for 270 days.  Indications: type 2 diabetes mellitus 49.5 mL 2    Insulin Needles, Disposable, (Pen Needle) 32 gauge x 5/32\" ndle Se to inject Lantus 50 Units at bedtime every day 50 Pen Needle 11    Insulin Syringe-Needle U-100 0.3 mL 30 gauge x 5/16\" syrg Use to inject novolin in sliding scale as indicated 3 times per day before meals 100 Syringe 11    Blood-Glucose Meter (Fora V12 Blood Glucose System) monitoring kit Use to check glucose 3 times a day before meals 1 Kit 0    glucose blood VI test strips (Fora V12 Glucose) strip Use to check glucose 3 times a day before meals 100 Strip 11    lancets-blood glucose strips (Fora [id number]-P71 strp-lnct) 30 gauge cmpk Use to check glucose 3 times a day before meals 1 Dose Pack 11    alcohol swabs (Alcohol Pads) padm Use to check glucose 3 times a day before meals 100 Pad 11    capsaicin (CAPZASIN-HP) 0.1 % topical cream Apply  to affected area three (3) times daily.  omega-3 acid ethyl esters (LOVAZA) 1 gram capsule Take 2 g by mouth two (2) times a day.  lancets (Prodigy Twist Top Lancet) 28 gauge misc by Does Not Apply route.  sildenafiL, pulmonary hypertension, (REVATIO) 20 mg tablet Take 20 mg by mouth three (3) times daily.  Insulin Syringe-Needle U-100 (UltiCare) 0.3 mL 30 gauge x 1/2\" syrg by Does Not Apply route.  Insulin Needles, Disposable, (Ultracare Pen Needle) 32 gauge x 1/4\" ndle by Does Not Apply route.  menthol (Cold and Hot Pain Relief) 5 % ptmd by Apply Externally route.  blood pressure test kit (WRIST BLOOD PRESSURE MONITOR) by Does Not Apply route.  labetaloL (NORMODYNE) 100 mg tablet Take 1 Tab by mouth two (2) times a day for 270 days. 180 Tab 2     No Known Allergies    Objective:  Visit Vitals  BP (!) 140/88 (BP 1 Location: Left arm, BP Patient Position: Sitting)   Pulse 70   Temp 98.8 °F (37.1 °C) (Oral)   Ht 5' 9\" (1.753 m)   Wt 242 lb 12.8 oz (110.1 kg)   SpO2 94% Comment: RA   BMI 35.86 kg/m²     Physical Exam:   Physical Exam  Constitutional:       General: He is not in acute distress. Appearance: Normal appearance. He is obese. HENT:      Head: Normocephalic and atraumatic. Mouth/Throat:      Mouth: Mucous membranes are moist.   Eyes:      Extraocular Movements: Extraocular movements intact. Conjunctiva/sclera: Conjunctivae normal.      Pupils: Pupils are equal, round, and reactive to light. Neck:      Musculoskeletal: Normal range of motion and neck supple. Cardiovascular:      Rate and Rhythm: Normal rate and regular rhythm. Pulses: Normal pulses. Heart sounds: Normal heart sounds. Pulmonary:      Effort: Pulmonary effort is normal.      Breath sounds: Normal breath sounds.    Abdominal:      General: Abdomen is flat. Bowel sounds are normal. There is no distension. Palpations: Abdomen is soft. There is no mass. Tenderness: There is no abdominal tenderness. Musculoskeletal:         General: No swelling or deformity. Right lower leg: No edema. Left lower leg: No edema. Lymphadenopathy:      Cervical: No cervical adenopathy. Skin:     General: Skin is warm and dry. Capillary Refill: Capillary refill takes less than 2 seconds. Coloration: Skin is not jaundiced or pale. Findings: No erythema or rash. Neurological:      General: No focal deficit present. Mental Status: He is alert and oriented to person, place, and time. Motor: Weakness (left foot drop ) present. Psychiatric:         Mood and Affect: Mood normal.         Behavior: Behavior normal.         Thought Content: Thought content normal.         Judgment: Judgment normal.          Results for orders placed or performed in visit on 10/08/20   AMB POC HEMOGLOBIN A1C   Result Value Ref Range    Hemoglobin A1c (POC) 7.8 %       Assessment/Plan:    A1c went up from 6.8% to 7.8%, mainly because of difficulty swallowing diet, just influenced by the fact he is at home where during this pandemic, counseled about this. Increase Lantus from 50 to 55 units, increase NovoLog from 5 units premeal to 8 units premeal.  Ask him to bring his glucometer to next appointment. Continue Jardiance and Victoza without changes. Hypertension, today a little elevated, but will make changes to his treatment, asked him to check his blood pressure and bring diary to next visit.   Needs refills on all his medications, and I signed paperwork for [facility] pharmacy for his diabetic supplies (glucometer, strips, etc.)  Recently seen by podiatry in August, note reviewed, diagnosed with right heel plantar fasciitis, given lidocaine and betamethasone injection to his right plantar heel, recommended to use his diabetic supportive shoe gear.      ICD-10-CM ICD-9-CM    1. Diabetic peripheral neuropathy (HCC)  E11.42 250.60 lidocaine (LIDODERM) 5 %     357.2    2. Controlled type 2 diabetes mellitus with diabetic polyneuropathy, with long-term current use of insulin (HCC)  E11.42 250.60 atorvastatin (LIPITOR) 40 mg tablet    Z79.4 357.2 empagliflozin (Jardiance) 25 mg tablet     V58.67 insulin aspart U-100 (NOVOLOG) 100 unit/mL (3 mL) inpn      liraglutide (Victoza 3-Alfredo) 0.6 mg/0.1 mL (18 mg/3 mL) pnij      AMB POC HEMOGLOBIN A1C      insulin glargine (Lantus Solostar U-100 Insulin) 100 unit/mL (3 mL) inpn      DISCONTINUED: insulin glargine (Lantus Solostar U-100 Insulin) 100 unit/mL (3 mL) inpn   3. Severe episode of recurrent major depressive disorder, without psychotic features ([facility]  F33.2 296.33 escitalopram oxalate (LEXAPRO) 10 mg tablet   4. Essential hypertension  I10 401.9 hydroCHLOROthiazide (HYDRODIURIL) 25 mg tablet      losartan (COZAAR) 100 mg tablet   5. Peripheral arterial occlusive disease (HCC)  I77.9 444.22 aspirin delayed-release (Aspirin Low Dose) 81 mg tablet      clopidogreL (PLAVIX) 75 mg tab   6. Severe obesity (BMI 35.0-35.9 with comorbidity) (HCC)  E66.01 278.01     Z68.35 V85.35      Orders Placed This Encounter    AMB POC HEMOGLOBIN A1C    aspirin delayed-release (Aspirin Low Dose) 81 mg tablet     Sig: Take 1 Tab by mouth daily for 270 days. Dispense:  90 Tab     Refill:  2    atorvastatin (LIPITOR) 40 mg tablet     Sig: Take 1 Tab by mouth daily for 270 days. Dispense:  90 Tab     Refill:  2    clopidogreL (PLAVIX) 75 mg tab     Sig: Take 1 Tab by mouth daily for 270 days. Dispense:  90 Tab     Refill:  2    empagliflozin (Jardiance) 25 mg tablet     Sig: Take 1 Tab by mouth daily for 270 days. Dispense:  90 Tab     Refill:  2    escitalopram oxalate (LEXAPRO) 10 mg tablet     Sig: Take 1 Tab by mouth daily for 90 days.  Indications: major depressive disorder     Dispense:  90 Tab     Refill:  2  hydroCHLOROthiazide (HYDRODIURIL) 25 mg tablet     Sig: Take 1 Tab by mouth daily for 270 days. Dispense:  90 Tab     Refill:  2    insulin aspart U-100 (NOVOLOG) 100 unit/mL (3 mL) inpn     Si Units by SubCUTAneous route Before breakfast, lunch, and dinner for 270 days. Use to inject 3 times a day before meals as needed according to sliding scale of 2 units per 50 units above 150 mg/dl, maximum daily dose 36 units. Dispense:  21.6 mL     Refill:  2    DISCONTD: insulin glargine (Lantus Solostar U-100 Insulin) 100 unit/mL (3 mL) inpn     Si Units by SubCUTAneous route nightly for 270 days. Indications: type 2 diabetes mellitus     Dispense:  45 mL     Refill:  2    losartan (COZAAR) 100 mg tablet     Sig: Take 1 Tab by mouth daily for 270 days. Dispense:  90 Tab     Refill:  2    liraglutide (Victoza 3-Alfredo) 0.6 mg/0.1 mL (18 mg/3 mL) pnij     Si.8 mg by SubCUTAneous route daily for 270 days. Dispense:  162 mg     Refill:  2    lidocaine (LIDODERM) 5 %     Si Patch by TransDERmal route every twenty-four (24) hours for 90 days. Apply patch to the affected area for 12 hours a day and remove for 12 hours a day. Dispense:  1 Each     Refill:  2    insulin glargine (Lantus Solostar U-100 Insulin) 100 unit/mL (3 mL) inpn     Si Units by SubCUTAneous route nightly for 270 days. Indications: type 2 diabetes mellitus     Dispense:  49.5 mL     Refill:  2     Please disregard previous prescription I just sent few mins ago, this one has new dose of Lantus. Thanks       There are no Patient Instructions on file for this visit. Follow-up and Dispositions    · Return in about 3 weeks (around 10/29/2020) for Medicare wellness please .

## 2020-10-08 NOTE — PROGRESS NOTES
Chief Complaint   Patient presents with    Diabetes    Hypertension     Pt states that he checked his BS today and it was 123mg/dl

## 2020-10-20 DIAGNOSIS — M21.372 ACQUIRED LEFT FOOT DROP: ICD-10-CM

## 2020-10-20 DIAGNOSIS — I77.9 PERIPHERAL ARTERIAL OCCLUSIVE DISEASE (HCC): ICD-10-CM

## 2020-10-20 DIAGNOSIS — E11.42 DIABETIC PERIPHERAL NEUROPATHY (HCC): Primary | ICD-10-CM

## 2020-10-21 ENCOUNTER — TELEPHONE (OUTPATIENT)
Dept: INTERNAL MEDICINE CLINIC | Age: 55
End: 2020-10-21

## 2020-10-21 NOTE — TELEPHONE ENCOUNTER
[facility] called left message on voice mail stating they have been trying to get a refill on the following:    NovoLIN R Regular U-100 Insulin 100 unit/mL injection solution     lov 10/8/2020  Nov 10/29/2020

## 2020-10-26 ENCOUNTER — TELEPHONE (OUTPATIENT)
Dept: INTERNAL MEDICINE CLINIC | Age: 55
End: 2020-10-26

## 2020-10-29 ENCOUNTER — OFFICE VISIT (OUTPATIENT)
Dept: INTERNAL MEDICINE CLINIC | Age: 55
End: 2020-10-29
Payer: MEDICARE

## 2020-10-29 VITALS
BODY MASS INDEX: 36.88 KG/M2 | OXYGEN SATURATION: 97 % | DIASTOLIC BLOOD PRESSURE: 84 MMHG | SYSTOLIC BLOOD PRESSURE: 132 MMHG | TEMPERATURE: 97.4 F | HEIGHT: 69 IN | WEIGHT: 249 LBS | HEART RATE: 72 BPM

## 2020-10-29 DIAGNOSIS — Z23 ENCOUNTER FOR IMMUNIZATION: Primary | ICD-10-CM

## 2020-10-29 DIAGNOSIS — E11.42 CONTROLLED TYPE 2 DIABETES MELLITUS WITH DIABETIC POLYNEUROPATHY, WITH LONG-TERM CURRENT USE OF INSULIN (HCC): ICD-10-CM

## 2020-10-29 DIAGNOSIS — Z13.31 SCREENING FOR DEPRESSION: ICD-10-CM

## 2020-10-29 DIAGNOSIS — Z00.00 INITIAL MEDICARE ANNUAL WELLNESS VISIT: ICD-10-CM

## 2020-10-29 DIAGNOSIS — Z13.39 SCREENING FOR ALCOHOLISM: ICD-10-CM

## 2020-10-29 DIAGNOSIS — Z79.4 CONTROLLED TYPE 2 DIABETES MELLITUS WITH DIABETIC POLYNEUROPATHY, WITH LONG-TERM CURRENT USE OF INSULIN (HCC): ICD-10-CM

## 2020-10-29 PROCEDURE — G0438 PPPS, INITIAL VISIT: HCPCS | Performed by: INTERNAL MEDICINE

## 2020-10-29 RX ORDER — INSULIN ASPART 100 [IU]/ML
8 INJECTION, SOLUTION INTRAVENOUS; SUBCUTANEOUS
Qty: 28.8 ML | Refills: 2 | Status: SHIPPED | OUTPATIENT
Start: 2020-10-29 | End: 2021-07-26

## 2020-10-29 RX ORDER — ZOSTER VACCINE RECOMBINANT, ADJUVANTED 50 MCG/0.5
0.5 KIT INTRAMUSCULAR ONCE
Qty: 0.5 ML | Refills: 0 | Status: SHIPPED | OUTPATIENT
Start: 2020-10-29 | End: 2020-10-29

## 2020-10-29 NOTE — PROGRESS NOTES
We discussed also about his insulin use novolog, humulin R and Lantus issue, he says he uses the novolog 8 U before meals as indicated, but, he's not eating 3 times a day, but 4 or 5 so he injects the novolog before that 1 or 2 additional meals because he says it's a full meal and glucose would go up, so he has humulin R as a backup if he runs out of the Novolog. He uses his lantus as instructed. He says he feels too hungry all the time and asked about appetite Supressant. Option usually approved by insurance is phentermine, but, contraindicated in his case due to PVD and CAD     This is an Initial Medicare Annual Wellness Exam (AWV) (Performed 12 months after IPPE or effective date of Medicare Part B enrollment, Once in a lifetime)    I have reviewed the patient's medical history in detail and updated the computerized patient record.      History     Patient Active Problem List   Diagnosis Code    Cataract H26.9    Diabetic peripheral neuropathy (HCC) E11.42    EKG, abnormal R94.31    Elevated liver enzymes R74.8    Essential hypertension I10    Hyperlipidemia E78.5    Diabetic mononeuropathy associated with type 2 diabetes mellitus ([address] E11.41    Pain in both lower legs M79.661, M79.662    Peripheral arterial occlusive disease (HCC) I77.9    Peripheral circulatory disorder associated with type 2 diabetes mellitus ([address] E11.51    Erectile dysfunction N52.9    Spasm of back muscles M62.830    Diabetes mellitus type 2, controlled ([address] E11.9    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    Atherosclerosis of arteries of extremities ([facility]) I70.209    Coronary arteriosclerosis in native artery I25.10    Ulcer of foot ([facility]) L97.509    Severe obesity (BMI 35.0-35.9 with comorbidity) ([facility]) E66.01, Z68.35    Onychomycosis B35.1    Plantar fasciitis of right foot M72.2     Past Medical History:   Diagnosis Date    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    Atherosclerosis of arteries of extremities ([address] 7/15/2020    Cataract 7/15/2020    Coronary arteriosclerosis in native artery 7/15/2020    Diabetes mellitus type 2, controlled ([facility] 7/15/2020    Diabetic mononeuropathy associated with type 2 diabetes mellitus ([facility] 7/15/2020    Diabetic peripheral neuropathy ([facility] 7/15/2020    EKG, abnormal 7/15/2020    Elevated liver enzymes 7/15/2020    Erectile dysfunction 7/15/2020    Essential hypertension 7/15/2020    Hepatitis     High cholesterol     Hyperlipidemia 7/15/2020    Hypertension     Pain in both lower legs 7/15/2020    Peripheral arterial occlusive disease ([facility] 7/15/2020    Peripheral circulatory disorder associated with type 2 diabetes mellitus ([facility] 7/15/2020    Spasm of back muscles 7/15/2020    Ulcer of foot ([facility] 7/15/2020      Past Surgical History:   Procedure Laterality Date    IR STENT PLACEMENT      Right Leg     Current Outpatient Medications   Medication Sig Dispense Refill    insulin aspart U-100 (NOVOLOG) 100 unit/mL (3 mL) inpn 8 Units by SubCUTAneous route Before breakfast, lunch, dinner and at bedtime for 270 days. Maximum daily dose 50 units. 28.8 mL 2    varicella-zoster recombinant, PF, (Shingrix, PF,) 50 mcg/0.5 mL susr injection 0.5 mL by IntraMUSCular route once for 1 dose. 0.5 mL 0    Diabetic Shoes XX Use every day 2 Device 1    aspirin delayed-release (Aspirin Low Dose) 81 mg tablet Take 1 Tab by mouth daily for 270 days. 90 Tab 2    atorvastatin (LIPITOR) 40 mg tablet Take 1 Tab by mouth daily for 270 days. 90 Tab 2    clopidogreL (PLAVIX) 75 mg tab Take 1 Tab by mouth daily for 270 days. 90 Tab 2    hydroCHLOROthiazide (HYDRODIURIL) 25 mg tablet Take 1 Tab by mouth daily for 270 days. 90 Tab 2    losartan (COZAAR) 100 mg tablet Take 1 Tab by mouth daily for 270 days. 90 Tab 2    liraglutide (Victoza 3-Alfredo) 0.6 mg/0.1 mL (18 mg/3 mL) pnij 1.8 mg by SubCUTAneous route daily for 270 days.  162 mg 2    lidocaine (LIDODERM) 5 % 1 Patch by TransDERmal route every twenty-four (24) hours for 90 days. Apply patch to the affected area for 12 hours a day and remove for 12 hours a day. 1 Each 2    insulin glargine (Lantus Solostar U-100 Insulin) 100 unit/mL (3 mL) inpn 55 Units by SubCUTAneous route nightly for 270 days. Indications: type 2 diabetes mellitus 49.5 mL 2    labetaloL (NORMODYNE) 100 mg tablet Take 1 Tab by mouth two (2) times a day for 270 days. 180 Tab 2    Insulin Needles, Disposable, (Pen Needle) 32 gauge x 5/32\" ndle Se to inject Lantus 50 Units at bedtime every day 50 Pen Needle 11    Insulin Syringe-Needle U-100 0.3 mL 30 gauge x 5/16\" syrg Use to inject novolin in sliding scale as indicated 3 times per day before meals 100 Syringe 11    Blood-Glucose Meter (Fora V12 Blood Glucose System) monitoring kit Use to check glucose 3 times a day before meals 1 Kit 0    glucose blood VI test strips (Fora V12 Glucose) strip Use to check glucose 3 times a day before meals 100 Strip 11    lancets-blood glucose strips (Fora W45-[id number]-W15 strp-lnct) 30 gauge cmpk Use to check glucose 3 times a day before meals 1 Dose Pack 11    alcohol swabs (Alcohol Pads) padm Use to check glucose 3 times a day before meals 100 Pad 11    omega-3 acid ethyl esters (LOVAZA) 1 gram capsule Take 2 g by mouth two (2) times a day.  lancets (Prodigy Twist Top Lancet) 28 gauge misc by Does Not Apply route.  sildenafiL, pulmonary hypertension, (REVATIO) 20 mg tablet Take 20 mg by mouth three (3) times daily.  Insulin Syringe-Needle U-100 (UltiCare) 0.3 mL 30 gauge x 1/2\" syrg by Does Not Apply route.  Insulin Needles, Disposable, (Ultracare Pen Needle) 32 gauge x 1/4\" ndle by Does Not Apply route.  menthol (Cold and Hot Pain Relief) 5 % ptmd by Apply Externally route.  blood pressure test kit (WRIST BLOOD PRESSURE MONITOR) by Does Not Apply route.        No Known Allergies    Family History   Problem Relation Age of Onset    Diabetes Mother     Heart Disease Mother     Diabetes Father     Diabetes Sister     Diabetes Brother      Social History     Tobacco Use    Smoking status: Never Smoker    Smokeless tobacco: Never Used   Substance Use Topics    Alcohol use: Never     Frequency: Never       Depression Risk Factor Screening:     3 most recent PHQ Screens 10/8/2020   Little interest or pleasure in doing things Not at all   Feeling down, depressed, irritable, or hopeless Not at all   Total Score PHQ 2 0       Alcohol Risk Screen   Do you average more than 2 drinks per night or 14 drinks a week: No    On any one occasion in the past three months have you have had more than 4 drinks containing alcohol:  No         Functional Ability and Level of Safety:   Hearing: Hearing is good. Activities of Daily Living: The home contains: handrails and grab bars  Patient does total self care    Ambulation: with difficulty, uses a cane and walker      Fall Risk:  No flowsheet data found. Abuse Screen:  Patient is not abused       Cognitive Screening   Has your family/caregiver stated any concerns about your memory: no     Cognitive Screening: Normal - Clock Drawing Test, Mini Cog Test    Patient Care Team   Patient Care Team:  [facility], MD as PCP - General (Internal Medicine)  [facility], MD as PCP - [facility]    Assessment/Plan   Education and counseling provided:  Are appropriate based on today's review and evaluation  Influenza Vaccine  Colorectal cancer screening tests  Medical nutrition therapy for individuals with diabetes or renal disease  Diabetes outpatient self-management training services    Diagnoses and all orders for this visit:    1. Encounter for immunization  -     varicella-zoster recombinant, PF, (Shingrix, PF,) 50 mcg/0.5 mL susr injection; 0.5 mL by IntraMUSCular route once for 1 dose. 2. Initial Medicare annual wellness visit  -     Es [place]    3.  Screening for alcoholism  -     DEPRESSION SCREEN ANNUAL    4. Screening for depression  -     DEPRESSION SCREEN ANNUAL    5. Controlled type 2 diabetes mellitus with diabetic polyneuropathy, with long-term current use of insulin (HCC)  -     insulin aspart U-100 (NOVOLOG) 100 unit/mL (3 mL) inpn; 8 Units by SubCUTAneous route Before breakfast, lunch, dinner and at bedtime for 270 days. Maximum daily dose 50 units.          Health Maintenance Due   Topic Date Due    Hepatitis C Screening  1965    Foot Exam Q1  11/22/1973    MICROALBUMIN Q1  11/22/1973    Eye Exam Retinal or Dilated  11/22/1973    Lipid Screen  11/22/1973    DTaP/Tdap/Td series (1 - Tdap) 11/22/1984    Shingrix Vaccine Age 50> (1 of 2) 11/22/2013    Colorectal Cancer Screening Combo  11/22/2013    Medicare Yearly Exam  02/25/2020    Flu Vaccine (1) 09/01/2020

## 2020-10-29 NOTE — PATIENT INSTRUCTIONS
Medicare Wellness Visit, Male    The best way to live healthy is to have a lifestyle where you eat a well-balanced diet, exercise regularly, limit alcohol use, and quit all forms of tobacco/nicotine, if applicable. Regular preventive services are another way to keep healthy. Preventive services (vaccines, screening tests, monitoring & exams) can help personalize your care plan, which helps you manage your own care. Screening tests can find health problems at the earliest stages, when they are easiest to treat. [facility] follows the current, evidence-based guidelines published by the [facility] ([facility]) when recommending preventive services for our patients. Because we follow these guidelines, sometimes recommendations change over time as research supports it. (For example, a prostate screening blood test is no longer routinely recommended for men with no symptoms). Of course, you and your doctor may decide to screen more often for some diseases, based on your risk and co-morbidities (chronic disease you are already diagnosed with). Preventive services for you include:  - Medicare offers their members a free annual wellness visit, which is time for you and your primary care provider to discuss and plan for your preventive service needs. Take advantage of this benefit every year!  -All adults over age 72 should receive the recommended pneumonia vaccines. Current USPSTF guidelines recommend a series of two vaccines for the best pneumonia protection.   -All adults should have a flu vaccine yearly and tetanus vaccine every 10 years.  -All adults age 48 and older should receive the shingles vaccines (series of two vaccines).        -All adults age 38-68 who are overweight should have a diabetes screening test once every three years.   -Other screening tests & preventive services for persons with diabetes include: an eye exam to screen for diabetic retinopathy, a kidney function test, a foot exam, and stricter control over your cholesterol.   -Cardiovascular screening for adults with routine risk involves an electrocardiogram (ECG) at intervals determined by the provider.   -Colorectal cancer screening should be done for adults age 54-65 with no increased risk factors for colorectal cancer. There are a number of acceptable methods of screening for this type of cancer. Each test has its own benefits and drawbacks. Discuss with your provider what is most appropriate for you during your annual wellness visit. The different tests include: colonoscopy (considered the best screening method), a fecal occult blood test, a fecal DNA test, and sigmoidoscopy.  -All adults born between [facility] should be screened once for Hepatitis C.  -An Abdominal Aortic Aneurysm (AAA) Screening is recommended for men age 73-68 who has ever smoked in their lifetime.      Here is a list of your current Health Maintenance items (your personalized list of preventive services) with a due date:  Health Maintenance Due   Topic Date Due    Hepatitis C Test  1965    Diabetic Foot Care  11/22/1973    Albumin Urine Test  11/22/1973    Eye Exam  11/22/1973    Cholesterol Test   11/22/1973    DTaP/Tdap/Td  (1 - Tdap) 11/22/1984    Shingles Vaccine (1 of 2) 11/22/2013    Colorectal Screening  11/22/2013    Annual Well Visit  02/25/2020    Yearly Flu Vaccine (1) 09/01/2020

## 2020-11-02 DIAGNOSIS — E11.42 CONTROLLED TYPE 2 DIABETES MELLITUS WITH DIABETIC POLYNEUROPATHY, WITH LONG-TERM CURRENT USE OF INSULIN (HCC): ICD-10-CM

## 2020-11-02 DIAGNOSIS — Z79.4 CONTROLLED TYPE 2 DIABETES MELLITUS WITH DIABETIC POLYNEUROPATHY, WITH LONG-TERM CURRENT USE OF INSULIN (HCC): ICD-10-CM

## 2020-11-05 RX ORDER — BLOOD SUGAR DIAGNOSTIC
STRIP MISCELLANEOUS
Qty: 120 STRIP | Refills: 11 | Status: SHIPPED | OUTPATIENT
Start: 2020-11-05 | End: 2021-06-17

## 2020-11-27 VITALS
SYSTOLIC BLOOD PRESSURE: 142 MMHG | WEIGHT: 244 LBS | TEMPERATURE: 97.5 F | HEIGHT: 69 IN | OXYGEN SATURATION: 100 % | DIASTOLIC BLOOD PRESSURE: 90 MMHG | BODY MASS INDEX: 36.14 KG/M2 | HEART RATE: 68 BPM

## 2020-11-27 DIAGNOSIS — G58.9 MONONEUROPATHY: ICD-10-CM

## 2020-11-27 PROBLEM — E11.51 PERIPHERAL CIRCULATORY DISORDER DUE TO TYPE 2 DIABETES MELLITUS (HCC): Status: ACTIVE | Noted: 2020-11-27

## 2020-11-27 RX ORDER — IBUPROFEN 600 MG/1
TABLET ORAL
COMMUNITY
End: 2021-04-29 | Stop reason: ALTCHOICE

## 2020-11-27 RX ORDER — GLUCOSAM/CHONDRO/HERB 149/HYAL 750-100 MG
1 TABLET ORAL DAILY
COMMUNITY
End: 2021-04-29 | Stop reason: SDUPTHER

## 2020-11-27 RX ORDER — CAPSAICIN 0.1 %
CREAM (GRAM) TOPICAL 3 TIMES DAILY
COMMUNITY
End: 2021-04-29 | Stop reason: ALTCHOICE

## 2020-11-27 RX ORDER — ESCITALOPRAM OXALATE 10 MG/1
10 TABLET ORAL DAILY
COMMUNITY
End: 2021-04-29 | Stop reason: ALTCHOICE

## 2020-11-27 RX ORDER — ESCITALOPRAM OXALATE 5 MG/1
TABLET ORAL DAILY
COMMUNITY
End: 2021-04-29 | Stop reason: ALTCHOICE

## 2020-11-27 RX ORDER — CYCLOBENZAPRINE HCL 10 MG
TABLET ORAL
COMMUNITY
End: 2021-04-29 | Stop reason: ALTCHOICE

## 2020-11-27 RX ORDER — AMOXICILLIN 500 MG/1
500 CAPSULE ORAL
COMMUNITY
End: 2021-04-29 | Stop reason: ALTCHOICE

## 2020-12-14 ENCOUNTER — TELEPHONE (OUTPATIENT)
Dept: INTERNAL MEDICINE CLINIC | Age: 55
End: 2020-12-14

## 2020-12-14 NOTE — TELEPHONE ENCOUNTER
Patient called requesting a refill for the following prescription:    Novolin R Regular U-100 Insuln

## 2021-01-01 RX ORDER — OMEGA-3-ACID ETHYL ESTERS 1 G/1
1 CAPSULE, LIQUID FILLED ORAL 4 TIMES DAILY
Qty: 120 CAP | Refills: 2 | Status: SHIPPED | OUTPATIENT
Start: 2021-01-01 | End: 2021-02-22 | Stop reason: SDUPTHER

## 2021-02-03 DIAGNOSIS — E11.42 CONTROLLED TYPE 2 DIABETES MELLITUS WITH DIABETIC POLYNEUROPATHY, WITH LONG-TERM CURRENT USE OF INSULIN (HCC): ICD-10-CM

## 2021-02-03 DIAGNOSIS — Z79.4 CONTROLLED TYPE 2 DIABETES MELLITUS WITH DIABETIC POLYNEUROPATHY, WITH LONG-TERM CURRENT USE OF INSULIN (HCC): ICD-10-CM

## 2021-02-03 NOTE — TELEPHONE ENCOUNTER
[facility] sent in a requests for prescription refill on    Pen Needle 32 gauge X 5/32\"    LOV 12/2/2020    FOV nothing

## 2021-02-04 RX ORDER — PEN NEEDLE, DIABETIC 31 GX3/16"
NEEDLE, DISPOSABLE MISCELLANEOUS
Qty: 200 PEN NEEDLE | Refills: 5 | Status: SHIPPED | OUTPATIENT
Start: 2021-02-04 | End: 2021-12-14 | Stop reason: SDUPTHER

## 2021-02-22 DIAGNOSIS — E11.42 CONTROLLED TYPE 2 DIABETES MELLITUS WITH DIABETIC POLYNEUROPATHY, WITH LONG-TERM CURRENT USE OF INSULIN (HCC): ICD-10-CM

## 2021-02-22 DIAGNOSIS — Z79.4 CONTROLLED TYPE 2 DIABETES MELLITUS WITH DIABETIC POLYNEUROPATHY, WITH LONG-TERM CURRENT USE OF INSULIN (HCC): ICD-10-CM

## 2021-02-22 RX ORDER — OMEGA-3-ACID ETHYL ESTERS 1 G/1
1 CAPSULE, LIQUID FILLED ORAL 4 TIMES DAILY
Qty: 120 CAP | Refills: 2 | Status: SHIPPED | OUTPATIENT
Start: 2021-02-22 | End: 2021-05-23

## 2021-02-22 RX ORDER — OMEGA-3-ACID ETHYL ESTERS 1 G/1
1 CAPSULE, LIQUID FILLED ORAL 4 TIMES DAILY
Qty: 120 CAP | Refills: 2 | Status: SHIPPED | OUTPATIENT
Start: 2021-02-22 | End: 2021-02-22 | Stop reason: SDUPTHER

## 2021-02-22 RX ORDER — ISOPROPYL ALCOHOL 70 ML/100ML
SWAB TOPICAL
Qty: 100 PAD | Refills: 11 | Status: ON HOLD | OUTPATIENT
Start: 2021-02-22 | End: 2022-01-21 | Stop reason: SDUPTHER

## 2021-02-22 RX ORDER — ISOPROPYL ALCOHOL 70 ML/100ML
SWAB TOPICAL
Qty: 100 PAD | Refills: 11 | Status: SHIPPED | OUTPATIENT
Start: 2021-02-22 | End: 2021-02-22 | Stop reason: SDUPTHER

## 2021-03-11 ENCOUNTER — TELEPHONE (OUTPATIENT)
Dept: INTERNAL MEDICINE CLINIC | Age: 56
End: 2021-03-11

## 2021-03-11 DIAGNOSIS — Z79.4 CONTROLLED TYPE 2 DIABETES MELLITUS WITH DIABETIC POLYNEUROPATHY, WITH LONG-TERM CURRENT USE OF INSULIN (HCC): ICD-10-CM

## 2021-03-11 DIAGNOSIS — E11.42 CONTROLLED TYPE 2 DIABETES MELLITUS WITH DIABETIC POLYNEUROPATHY, WITH LONG-TERM CURRENT USE OF INSULIN (HCC): ICD-10-CM

## 2021-03-11 RX ORDER — INSULIN GLARGINE 100 [IU]/ML
55 INJECTION, SOLUTION SUBCUTANEOUS
Qty: 49.5 ML | Refills: 2 | Status: SHIPPED | OUTPATIENT
Start: 2021-03-11 | End: 2021-12-08 | Stop reason: SDUPTHER

## 2021-03-11 NOTE — TELEPHONE ENCOUNTER
Patient called and would like his prescription for insultin ([place]) refilled and sent to [facility].     LOV 10/29/2020    FOV 3/22/2021

## 2021-03-18 DIAGNOSIS — M21.372 ACQUIRED LEFT FOOT DROP: ICD-10-CM

## 2021-03-18 DIAGNOSIS — E11.42 DIABETIC PERIPHERAL NEUROPATHY (HCC): Primary | ICD-10-CM

## 2021-03-22 ENCOUNTER — OFFICE VISIT (OUTPATIENT)
Dept: INTERNAL MEDICINE CLINIC | Age: 56
End: 2021-03-22
Payer: MEDICARE

## 2021-03-22 VITALS
TEMPERATURE: 97.8 F | WEIGHT: 243.2 LBS | DIASTOLIC BLOOD PRESSURE: 83 MMHG | HEART RATE: 67 BPM | SYSTOLIC BLOOD PRESSURE: 133 MMHG | OXYGEN SATURATION: 96 % | HEIGHT: 69 IN | BODY MASS INDEX: 36.02 KG/M2 | RESPIRATION RATE: 20 BRPM

## 2021-03-22 DIAGNOSIS — N52.01 ERECTILE DYSFUNCTION DUE TO ARTERIAL INSUFFICIENCY: ICD-10-CM

## 2021-03-22 DIAGNOSIS — E11.42 CONTROLLED TYPE 2 DIABETES MELLITUS WITH DIABETIC POLYNEUROPATHY, WITH LONG-TERM CURRENT USE OF INSULIN (HCC): ICD-10-CM

## 2021-03-22 DIAGNOSIS — M72.2 PLANTAR FASCIITIS OF RIGHT FOOT: ICD-10-CM

## 2021-03-22 DIAGNOSIS — E11.42 DIABETIC PERIPHERAL NEUROPATHY (HCC): Primary | ICD-10-CM

## 2021-03-22 DIAGNOSIS — E11.65 UNCONTROLLED TYPE 2 DIABETES MELLITUS WITH HYPERGLYCEMIA (HCC): ICD-10-CM

## 2021-03-22 DIAGNOSIS — I77.9 PERIPHERAL ARTERIAL OCCLUSIVE DISEASE (HCC): ICD-10-CM

## 2021-03-22 DIAGNOSIS — M21.372 ACQUIRED LEFT FOOT DROP: ICD-10-CM

## 2021-03-22 DIAGNOSIS — I10 ESSENTIAL HYPERTENSION: ICD-10-CM

## 2021-03-22 DIAGNOSIS — Z79.4 CONTROLLED TYPE 2 DIABETES MELLITUS WITH DIABETIC POLYNEUROPATHY, WITH LONG-TERM CURRENT USE OF INSULIN (HCC): ICD-10-CM

## 2021-03-22 DIAGNOSIS — R79.89 LOW TESTOSTERONE IN MALE: ICD-10-CM

## 2021-03-22 DIAGNOSIS — E66.01 SEVERE OBESITY (HCC): ICD-10-CM

## 2021-03-22 LAB — HBA1C MFR BLD HPLC: 8.2 %

## 2021-03-22 PROCEDURE — G8752 SYS BP LESS 140: HCPCS | Performed by: INTERNAL MEDICINE

## 2021-03-22 PROCEDURE — G8510 SCR DEP NEG, NO PLAN REQD: HCPCS | Performed by: INTERNAL MEDICINE

## 2021-03-22 PROCEDURE — 2022F DILAT RTA XM EVC RTNOPTHY: CPT | Performed by: INTERNAL MEDICINE

## 2021-03-22 PROCEDURE — G8417 CALC BMI ABV UP PARAM F/U: HCPCS | Performed by: INTERNAL MEDICINE

## 2021-03-22 PROCEDURE — G8754 DIAS BP LESS 90: HCPCS | Performed by: INTERNAL MEDICINE

## 2021-03-22 PROCEDURE — 83036 HEMOGLOBIN GLYCOSYLATED A1C: CPT | Performed by: INTERNAL MEDICINE

## 2021-03-22 PROCEDURE — 99214 OFFICE O/P EST MOD 30 MIN: CPT | Performed by: INTERNAL MEDICINE

## 2021-03-22 PROCEDURE — G8427 DOCREV CUR MEDS BY ELIG CLIN: HCPCS | Performed by: INTERNAL MEDICINE

## 2021-03-22 PROCEDURE — 3017F COLORECTAL CA SCREEN DOC REV: CPT | Performed by: INTERNAL MEDICINE

## 2021-03-22 NOTE — PROGRESS NOTES
Macario Wong is a 60 y.o. male and presents with Follow-up, Hypertension, Diabetes, and Peripheral Neuropathy    DM with peripheral neuropathy and foot drop, Obesity: weight is stable, no weight loss, he says glucose this am was 79, ontinues lantus 55 U pm, novolog 8 units before meals and sliding scale 2:50:150 as needed and Victoza 1.8 mg/day  He was seen by Podiatry and complete foot exam was done in August 2020, he has right plantar fasciitis, which has been progressing, he has been seeing another podiatrist and having cortisone injections as per what he tells me. Ophthalmology: 2 weeks ago, was ordered new glasses prescription, I have not received notes. 1 [facility] having problems walking from bedroom to bathroom, difficult to move around home due to bilateral foot numbness, right foot pain and  left foot drop. He says he wants to try to have a score ordered again, he has changed his insurance and he was told to ask me to send a prescription of it to [facility].  He wants me to order him IV sildenafil for ED, he says he watched youtube videos and he has learned how to use it. He has recently been taking orlistat, but over-the-counter, he feels it has been helping with his appetite and weight loss, says he has lost 2 pounds since he started around 3 weeks ago. He wants a prescription for it    HTN: Well controlled, taking his medications as prescribed. Review of Systems  Review of Systems   Constitutional: Negative for chills, fatigue, fever and unexpected weight change. HENT: Negative for congestion, ear pain, sneezing and sore throat. Eyes: Negative for pain and discharge. Respiratory: Negative for cough, shortness of breath and wheezing. Cardiovascular: Negative for chest pain, palpitations and leg swelling. Gastrointestinal: Negative for abdominal pain, blood in stool, constipation and diarrhea. Endocrine: Negative for polydipsia and polyuria.    Genitourinary: Negative for difficulty urinating, dysuria, frequency, hematuria and urgency. Musculoskeletal: Positive for arthralgias and gait problem. Negative for back pain and joint swelling. Skin: Negative for rash. Allergic/Immunologic: Negative for environmental allergies and food allergies. Neurological: Positive for numbness. Negative for dizziness, speech difficulty, weakness, light-headedness and headaches. Hematological: Negative for adenopathy. Psychiatric/Behavioral: Negative for behavioral problems (Depression), sleep disturbance and suicidal ideas.           Past Medical History:   Diagnosis Date    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    Atherosclerosis of arteries of extremities ([address] 7/15/2020    Cataract 7/15/2020    Coronary arteriosclerosis in native artery 7/15/2020    Diabetes mellitus type 2, controlled ([address] 7/15/2020    Diabetic mononeuropathy associated with type 2 diabetes mellitus ([address] 7/15/2020    Diabetic peripheral neuropathy ([address] 7/15/2020    EKG, abnormal 7/15/2020    Elevated liver enzymes 7/15/2020    Erectile dysfunction 7/15/2020    Essential hypertension 7/15/2020    Hepatitis     High cholesterol     Hyperlipidemia 7/15/2020    Hypertension     Pain in both lower legs 7/15/2020    Peripheral arterial occlusive disease ([address] 7/15/2020    Peripheral circulatory disorder associated with type 2 diabetes mellitus ([address] 7/15/2020    Spasm of back muscles 7/15/2020    Ulcer of foot ([address] 7/15/2020     Past Surgical History:   Procedure Laterality Date    IR STENT PLACEMENT      Right Leg     Social History     Socioeconomic History    Marital status: SINGLE     Spouse name: Not on file    Number of children: Not on file    Years of education: Not on file    Highest education level: Not on file   Tobacco Use    Smoking status: Never Smoker    Smokeless tobacco: Never Used   Substance and Sexual Activity    Alcohol use: Never     Frequency: Never     Binge frequency: Never    Drug use: Never     Family History   Problem Relation Age of Onset    Diabetes Mother     Heart Disease Mother     Diabetes Father     Diabetes Sister     Diabetes Brother      Current Outpatient Medications   Medication Sig Dispense Refill    Orlistat 60 mg capsule Take 60 mg by mouth three (3) times daily (with meals) for 90 days. Indications: weight loss management for overweight or obese person 90 Cap 3    miscellaneous medical supply misc For daily use 1 Each 0    insulin glargine (Lantus Solostar U-100 Insulin) 100 unit/mL (3 mL) inpn 55 Units by SubCUTAneous route nightly for 270 days. Indications: type 2 diabetes mellitus 49.5 mL 2    alcohol swabs (Alcohol Pads) padm Use to check glucose 3 times a day before meals 100 Pad 11    omega-3 acid ethyl esters (LOVAZA) 1 gram capsule Take 1 Cap by mouth four (4) times daily for 90 days. 120 Cap 2    Insulin Needles, Disposable, (Pen Needle) 32 gauge x 5/32\" ndle Use to inject insulin 4 times a day 200 Pen Needle 5    insulin syr/ndl U100 half karely 0.3 mL 29 gauge x 1/2\" syrg by Does Not Apply route.  glucose blood VI test strips (Prodigy No Coding) strip TEST  GLUCOSE 4 times DAILY 120 Strip 11    insulin aspart U-100 (NOVOLOG) 100 unit/mL (3 mL) inpn 8 Units by SubCUTAneous route Before breakfast, lunch, dinner and at bedtime for 270 days. Maximum daily dose 50 units. 28.8 mL 2    Diabetic Shoes XX Use every day 2 Device 1    aspirin delayed-release (Aspirin Low Dose) 81 mg tablet Take 1 Tab by mouth daily for 270 days. 90 Tab 2    atorvastatin (LIPITOR) 40 mg tablet Take 1 Tab by mouth daily for 270 days. 90 Tab 2    clopidogreL (PLAVIX) 75 mg tab Take 1 Tab by mouth daily for 270 days. 90 Tab 2    hydroCHLOROthiazide (HYDRODIURIL) 25 mg tablet Take 1 Tab by mouth daily for 270 days. 90 Tab 2    losartan (COZAAR) 100 mg tablet Take 1 Tab by mouth daily for 270 days.  90 Tab 2    liraglutide (Victoza 3-Alfredo) 0.6 mg/0.1 mL (18 mg/3 mL) pnij 1.8 mg by SubCUTAneous route daily for 270 days. 162 mg 2    labetaloL (NORMODYNE) 100 mg tablet Take 1 Tab by mouth two (2) times a day for 270 days. 180 Tab 2    Insulin Syringe-Needle U-100 0.3 mL 30 gauge x 5/16\" syrg Use to inject novolin in sliding scale as indicated 3 times per day before meals 100 Syringe 11    Blood-Glucose Meter (Fora V12 Blood Glucose System) monitoring kit Use to check glucose 3 times a day before meals 1 Kit 0    lancets-blood glucose strips (Fora [id number] strp-lnct) 30 gauge cmpk Use to check glucose 3 times a day before meals 1 Dose Pack 11    lancets (Prodigy Twist Top Lancet) 28 gauge misc by Does Not Apply route.  sildenafiL, pulmonary hypertension, (REVATIO) 20 mg tablet Take 20 mg by mouth three (3) times daily.  Insulin Syringe-Needle U-100 (UltiCare) 0.3 mL 30 gauge x 1/2\" syrg by Does Not Apply route.  Insulin Needles, Disposable, (Ultracare Pen Needle) 32 gauge x 1/4\" ndle by Does Not Apply route.  blood pressure test kit (WRIST BLOOD PRESSURE MONITOR) by Does Not Apply route.  amoxicillin (AMOXIL) 500 mg capsule Take 500 mg by mouth.  capsaicin (CAPZASIN-HP) 0.1 % topical cream Apply  to affected area three (3) times daily.  cyclobenzaprine (FLEXERIL) 10 mg tablet Take  by mouth three (3) times daily as needed for Muscle Spasm(s).  escitalopram oxalate (LEXAPRO) 10 mg tablet Take 10 mg by mouth daily.  escitalopram oxalate (LEXAPRO) 5 mg tablet Take  by mouth daily.  ibuprofen (MOTRIN) 600 mg tablet Take  by mouth every six (6) hours as needed for Pain.  insulin regular (NovoLIN R Regular U-100 Insuln) 100 unit/mL injection by SubCUTAneous route.  omega 3-DHA-EPA-fish oil 1,000 mg (120 mg-180 mg) capsule Take 1 Cap by mouth daily.  menthol (Cold and Hot Pain Relief) 5 % ptmd by Apply Externally route.        No Known Allergies    Objective:  Visit Vitals  /83 (BP 1 Location: Left upper arm, BP Patient Position: Sitting, BP Cuff Size: Adult)   Pulse 67   Temp 97.8 °F (36.6 °C) (Oral)   Resp 20   Ht 5' 9\" (1.753 m)   Wt 243 lb 3.2 oz (110.3 kg)   SpO2 96% Comment: RA   BMI 35.91 kg/m²     Physical Exam:   Physical Exam  Constitutional:       General: He is not in acute distress. Appearance: Normal appearance. HENT:      Head: Normocephalic and atraumatic. Mouth/Throat:      Mouth: Mucous membranes are moist.   Eyes:      Extraocular Movements: Extraocular movements intact. Conjunctiva/sclera: Conjunctivae normal.      Pupils: Pupils are equal, round, and reactive to light. Neck:      Musculoskeletal: Normal range of motion and neck supple. Cardiovascular:      Rate and Rhythm: Normal rate and regular rhythm. Pulses: Normal pulses. Heart sounds: Normal heart sounds. Pulmonary:      Effort: Pulmonary effort is normal.      Breath sounds: Normal breath sounds. Abdominal:      General: Abdomen is flat. Bowel sounds are normal. There is no distension. Palpations: Abdomen is soft. There is no mass. Tenderness: There is no abdominal tenderness. Musculoskeletal:         General: No swelling or deformity. Right lower leg: No edema. Left lower leg: No edema. Lymphadenopathy:      Cervical: No cervical adenopathy. Skin:     General: Skin is warm and dry. Capillary Refill: Capillary refill takes less than 2 seconds. Coloration: Skin is not jaundiced or pale. Findings: No erythema or rash. Neurological:      General: No focal deficit present. Mental Status: He is alert and oriented to person, place, and time. Gait: Gait abnormal (Wide-based, slow). Psychiatric:         Mood and Affect: Mood normal.         Behavior: Behavior normal.         Thought Content:  Thought content normal.         Judgment: Judgment normal.          Results for orders placed or performed in visit on 03/22/21   MICROALBUMIN, UR, RAND W/ MICROALB/CREAT RATIO   Result Value Ref Range    Creatinine, urine 98.5 Not Estab. mg/dL    Microalbumin, urine 7.2 Not Estab. ug/mL    Microalb/Creat ratio (ug/mg creat.) 7 0 - 29 mg/g creat   T4, FREE   Result Value Ref Range    T4, Free 1.00 0.82 - 1.77 ng/dL   TSH 3RD GENERATION   Result Value Ref Range    TSH 3.270 0.450 - 4.500 uIU/mL   CBC WITH AUTOMATED DIFF   Result Value Ref Range    WBC 4.4 3.4 - 10.8 x10E3/uL    RBC 5.02 4.14 - 5.80 x10E6/uL    HGB 14.2 13.0 - 17.7 g/dL    HCT 42.5 37.5 - 51.0 %    MCV 85 79 - 97 fL    MCH 28.3 26.6 - 33.0 pg    MCHC 33.4 31.5 - 35.7 g/dL    RDW 12.5 11.6 - 15.4 %    PLATELET 845 992 - 584 x10E3/uL    NEUTROPHILS 52 Not Estab. %    Lymphocytes 37 Not Estab. %    MONOCYTES 8 Not Estab. %    EOSINOPHILS 2 Not Estab. %    BASOPHILS 1 Not Estab. %    ABS. NEUTROPHILS 2.3 1.4 - 7.0 x10E3/uL    Abs Lymphocytes 1.6 0.7 - 3.1 x10E3/uL    ABS. MONOCYTES 0.4 0.1 - 0.9 x10E3/uL    ABS. EOSINOPHILS 0.1 0.0 - 0.4 x10E3/uL    ABS. BASOPHILS 0.0 0.0 - 0.2 x10E3/uL    IMMATURE GRANULOCYTES 0 Not Estab. %    ABS. IMM. GRANS. 0.0 0.0 - 0.1 G63T2/WW   METABOLIC PANEL, COMPREHENSIVE   Result Value Ref Range    Glucose 124 (H) 65 - 99 mg/dL    BUN 8 6 - 24 mg/dL    Creatinine 1.00 0.76 - 1.27 mg/dL    GFR est non-AA 83 >59 mL/min/1.73    GFR est AA 96 >59 mL/min/1.73    BUN/Creatinine ratio 8 (L) 9 - 20    Sodium 140 134 - 144 mmol/L    Potassium 3.6 3.5 - 5.2 mmol/L    Chloride 100 96 - 106 mmol/L    CO2 30 (H) 20 - 29 mmol/L    Calcium 9.9 8.7 - 10.2 mg/dL    Protein, total 7.4 6.0 - 8.5 g/dL    Albumin 4.8 3.8 - 4.9 g/dL    GLOBULIN, TOTAL 2.6 1.5 - 4.5 g/dL    A-G Ratio 1.8 1.2 - 2.2    Bilirubin, total 0.5 0.0 - 1.2 mg/dL    Alk.  phosphatase 87 39 - 117 IU/L    AST (SGOT) 27 0 - 40 IU/L    ALT (SGPT) 32 0 - 44 IU/L   LIPID PANEL   Result Value Ref Range    Cholesterol, total 151 100 - 199 mg/dL    Triglyceride 176 (H) 0 - 149 mg/dL    HDL Cholesterol 34 (L) >39 mg/dL    VLDL, calculated 30 5 - 40 mg/dL    LDL, calculated 87 0 - 99 mg/dL   PSA, DIAGNOSTIC (PROSTATE SPECIFIC AG)   Result Value Ref Range    Prostate Specific Ag 0.2 0.0 - 4.0 ng/mL   TESTOSTERONE, TOTAL, ADULT MALE   Result Value Ref Range    Testosterone 206 (L) 264 - 916 ng/dL   AMB POC HEMOGLOBIN A1C   Result Value Ref Range    Hemoglobin A1c (POC) 8.2 %       Assessment/Plan:    A1c is 8.2%, went up from 7.8% in October 2020 this is mostly related to chronic poor diet habits and frequent snacking, will refer him to diabetic education for this purpose. He is compliant with his insulin and medications. 1 make any changes in his treatment right now. Has recently started orlistat, he can continue, will send a prescription, I explained he needs to take it with meals 3 times a day, main expected side effect is diarrhea, explained he needs to be monitoring his glucose that he has and inform me if there is any drop in his glucose to less than 70. Continues having erectile dysfunction, this is something that bothers him significantly, as before sildenafil injections which I am not trying to prescribe I explained him that, he was little upset but he understood and agreed to see urology, will also check testosterone levels, he has multiple reasons to have ED    Continues having difficulty walking short distances inside his home, due to the reasons mentioned in HPI, no recent prescription for scooter as requested by him. I have also given him a referral few days back for physical medicine and rehabilitation      ICD-10-CM ICD-9-CM    1. Diabetic peripheral neuropathy (HCC)  E11.42 250.60 REFERRAL TO DIABETIC EDUCATION     357.2 miscellaneous medical supply misc   2. Peripheral arterial occlusive disease (HCC)  I77.9 444.22 REFERRAL TO DIABETIC EDUCATION      miscellaneous medical supply misc   3. Essential hypertension  I10 401.9    4. Severe obesity ([facility])  E66.01 278.01 Orlistat 60 mg capsule   5.  Controlled type 2 diabetes mellitus with diabetic polyneuropathy, with long-term current use of insulin (formerly [facility])  E11.42 250.60     Z79.4 357.2      V58.67    6. Acquired left foot drop  M21.372 736.79 miscellaneous medical supply misc   7. Plantar fasciitis of right foot  M72.2 728.71 miscellaneous medical supply misc   8. Erectile dysfunction due to arterial insufficiency  N52.01 607.84 REFERRAL TO UROLOGY      PSA, DIAGNOSTIC (PROSTATE SPECIFIC AG)      TESTOSTERONE, TOTAL, ADULT MALE      REFERRAL TO DIABETIC EDUCATION      TESTOSTERONE, TOTAL, ADULT MALE      FSH AND LH   9. Uncontrolled type 2 diabetes mellitus with hyperglycemia (formerly [facility])  E11.65 250.02 AMB POC HEMOGLOBIN A1C      MICROALBUMIN, UR, RAND W/ MICROALB/CREAT RATIO      T4, FREE      TSH 3RD GENERATION      CBC WITH AUTOMATED DIFF      METABOLIC PANEL, COMPREHENSIVE      LIPID PANEL      REFERRAL TO DIABETIC EDUCATION   10. Low testosterone in male  R79.89 790.99 TESTOSTERONE, TOTAL, ADULT MALE      FSH AND LH     Orders Placed This Encounter    MICROALBUMIN, UR, RAND W/ MICROALB/CREAT RATIO    T4, FREE    TSH 3RD GENERATION    CBC WITH AUTOMATED DIFF    METABOLIC PANEL, COMPREHENSIVE    LIPID PANEL    PROSTATE SPECIFIC AG    TESTOSTERONE, TOTAL, ADULT MALE    TESTOSTERONE, TOTAL, ADULT MALE    [facility] ref [facility]     Referral Priority:   Routine     Referral Type:   Consultation     Referral Reason:   Specialty Services Required     Referred to Provider:   Jasbir Amaya MD     Number of Visits Requested:   1    REFERRAL TO DIABETES EDUCATION - [address]     Standing Status:   Standing     Number of Occurrences:   5     Standing Expiration Date:   3/22/2022     Referral Priority:   Routine     Referral Type:   Consultation     Referral Reason:   Specialty Services Required     Number of Visits Requested:   1    AMB POC HEMOGLOBIN A1C    Orlistat 60 mg capsule     Sig: Take 60 mg by mouth three (3) times daily (with meals) for 90 days.  Indications: weight loss management for overweight or obese person     Dispense:  90 Cap     Refill:  3    miscellaneous medical supply misc     Sig: For daily use     Dispense:  1 Each     Refill:  0     4 wheel electric scooter     lose weight, increase physical activity, follow low fat diet, follow low salt diet, routine labs ordered, call if any problems, bring glucose logs to next visit   There are no Patient Instructions on file for this visit. Follow-up and Dispositions    · Return in about 3 months (around 6/22/2021).

## 2021-03-22 NOTE — PROGRESS NOTES
1. Have you been to the ER, urgent care clinic since your last visit? Hospitalized since your last visit? No    2. Have you seen or consulted any other health care providers outside of the [address] [place] [address] since your last visit? Include any pap smears or colon screening. No     Chief Complaint   Patient presents with    Follow-up    Hypertension    Diabetes    Peripheral Neuropathy     Pt states that he is here for a f/u visit.  States that his BS today was 79mg/dl

## 2021-03-23 LAB
ALBUMIN SERPL-MCNC: 4.8 G/DL (ref 3.8–4.9)
ALBUMIN/CREAT UR: 7 MG/G CREAT (ref 0–29)
ALBUMIN/GLOB SERPL: 1.8 {RATIO} (ref 1.2–2.2)
ALP SERPL-CCNC: 87 IU/L (ref 39–117)
ALT SERPL-CCNC: 32 IU/L (ref 0–44)
AST SERPL-CCNC: 27 IU/L (ref 0–40)
BASOPHILS # BLD AUTO: 0 X10E3/UL (ref 0–0.2)
BASOPHILS NFR BLD AUTO: 1 %
BILIRUB SERPL-MCNC: 0.5 MG/DL (ref 0–1.2)
BUN SERPL-MCNC: 8 MG/DL (ref 6–24)
BUN/CREAT SERPL: 8 (ref 9–20)
CALCIUM SERPL-MCNC: 9.9 MG/DL (ref 8.7–10.2)
CHLORIDE SERPL-SCNC: 100 MMOL/L (ref 96–106)
CHOLEST SERPL-MCNC: 151 MG/DL (ref 100–199)
CO2 SERPL-SCNC: 30 MMOL/L (ref 20–29)
CREAT SERPL-MCNC: 1 MG/DL (ref 0.76–1.27)
CREAT UR-MCNC: 98.5 MG/DL
EOSINOPHIL # BLD AUTO: 0.1 X10E3/UL (ref 0–0.4)
EOSINOPHIL NFR BLD AUTO: 2 %
ERYTHROCYTE [DISTWIDTH] IN BLOOD BY AUTOMATED COUNT: 12.5 % (ref 11.6–15.4)
GLOBULIN SER CALC-MCNC: 2.6 G/DL (ref 1.5–4.5)
GLUCOSE SERPL-MCNC: 124 MG/DL (ref 65–99)
HCT VFR BLD AUTO: 42.5 % (ref 37.5–51)
HDLC SERPL-MCNC: 34 MG/DL
HGB BLD-MCNC: 14.2 G/DL (ref 13–17.7)
IMM GRANULOCYTES # BLD AUTO: 0 X10E3/UL (ref 0–0.1)
IMM GRANULOCYTES NFR BLD AUTO: 0 %
LDLC SERPL CALC-MCNC: 87 MG/DL (ref 0–99)
LYMPHOCYTES # BLD AUTO: 1.6 X10E3/UL (ref 0.7–3.1)
LYMPHOCYTES NFR BLD AUTO: 37 %
MCH RBC QN AUTO: 28.3 PG (ref 26.6–33)
MCHC RBC AUTO-ENTMCNC: 33.4 G/DL (ref 31.5–35.7)
MCV RBC AUTO: 85 FL (ref 79–97)
MICROALBUMIN UR-MCNC: 7.2 UG/ML
MONOCYTES # BLD AUTO: 0.4 X10E3/UL (ref 0.1–0.9)
MONOCYTES NFR BLD AUTO: 8 %
NEUTROPHILS # BLD AUTO: 2.3 X10E3/UL (ref 1.4–7)
NEUTROPHILS NFR BLD AUTO: 52 %
PLATELET # BLD AUTO: 314 X10E3/UL (ref 150–450)
POTASSIUM SERPL-SCNC: 3.6 MMOL/L (ref 3.5–5.2)
PROT SERPL-MCNC: 7.4 G/DL (ref 6–8.5)
PSA SERPL-MCNC: 0.2 NG/ML (ref 0–4)
RBC # BLD AUTO: 5.02 X10E6/UL (ref 4.14–5.8)
SODIUM SERPL-SCNC: 140 MMOL/L (ref 134–144)
T4 FREE SERPL-MCNC: 1 NG/DL (ref 0.82–1.77)
TESTOST SERPL-MCNC: 206 NG/DL (ref 264–916)
TRIGL SERPL-MCNC: 176 MG/DL (ref 0–149)
TSH SERPL DL<=0.005 MIU/L-ACNC: 3.27 UIU/ML (ref 0.45–4.5)
VLDLC SERPL CALC-MCNC: 30 MG/DL (ref 5–40)
WBC # BLD AUTO: 4.4 X10E3/UL (ref 3.4–10.8)

## 2021-03-23 NOTE — PROGRESS NOTES
Les please let him know his testosterone level is low, we need to repeat a second one and I need to check some other hormones to determine if he can start testosterone replacement therapy. Please ask him to do the test anywhere between 8 and 10 am for better accuracy.  Thanks

## 2021-04-07 LAB
FSH SERPL-ACNC: 2.6 MIU/ML (ref 1.5–12.4)
LH SERPL-ACNC: 7.1 MIU/ML (ref 1.7–8.6)
TESTOST SERPL-MCNC: 323 NG/DL (ref 264–916)

## 2021-04-12 ENCOUNTER — TELEPHONE (OUTPATIENT)
Dept: INTERNAL MEDICINE CLINIC | Age: 56
End: 2021-04-12

## 2021-04-12 NOTE — TELEPHONE ENCOUNTER
Patient called and wanted Dr. Gal Diggs to have fax no. [phone] for referral for his scooter to be ordered.

## 2021-04-27 ENCOUNTER — OFFICE VISIT (OUTPATIENT)
Dept: PODIATRY | Age: 56
End: 2021-04-27
Payer: MEDICARE

## 2021-04-27 VITALS
TEMPERATURE: 97.1 F | WEIGHT: 241.4 LBS | BODY MASS INDEX: 35.76 KG/M2 | OXYGEN SATURATION: 99 % | HEIGHT: 69 IN | DIASTOLIC BLOOD PRESSURE: 86 MMHG | SYSTOLIC BLOOD PRESSURE: 140 MMHG | HEART RATE: 68 BPM

## 2021-04-27 DIAGNOSIS — E11.42 DIABETIC PERIPHERAL NEUROPATHY (HCC): ICD-10-CM

## 2021-04-27 DIAGNOSIS — E11.42 CONTROLLED TYPE 2 DIABETES MELLITUS WITH DIABETIC POLYNEUROPATHY, WITH LONG-TERM CURRENT USE OF INSULIN (HCC): Primary | ICD-10-CM

## 2021-04-27 DIAGNOSIS — B35.1 ONYCHOMYCOSIS: ICD-10-CM

## 2021-04-27 DIAGNOSIS — Z79.4 CONTROLLED TYPE 2 DIABETES MELLITUS WITH DIABETIC POLYNEUROPATHY, WITH LONG-TERM CURRENT USE OF INSULIN (HCC): Primary | ICD-10-CM

## 2021-04-27 DIAGNOSIS — M72.2 PLANTAR FASCIITIS OF RIGHT FOOT: ICD-10-CM

## 2021-04-27 PROCEDURE — 11721 DEBRIDE NAIL 6 OR MORE: CPT | Performed by: PODIATRIST

## 2021-04-27 PROCEDURE — 3052F HG A1C>EQUAL 8.0%<EQUAL 9.0%: CPT | Performed by: PODIATRIST

## 2021-04-27 PROCEDURE — G8754 DIAS BP LESS 90: HCPCS | Performed by: PODIATRIST

## 2021-04-27 PROCEDURE — 3017F COLORECTAL CA SCREEN DOC REV: CPT | Performed by: PODIATRIST

## 2021-04-27 PROCEDURE — 99214 OFFICE O/P EST MOD 30 MIN: CPT | Performed by: PODIATRIST

## 2021-04-27 PROCEDURE — G8417 CALC BMI ABV UP PARAM F/U: HCPCS | Performed by: PODIATRIST

## 2021-04-27 PROCEDURE — G8427 DOCREV CUR MEDS BY ELIG CLIN: HCPCS | Performed by: PODIATRIST

## 2021-04-27 PROCEDURE — 2022F DILAT RTA XM EVC RTNOPTHY: CPT | Performed by: PODIATRIST

## 2021-04-27 PROCEDURE — G8432 DEP SCR NOT DOC, RNG: HCPCS | Performed by: PODIATRIST

## 2021-04-27 PROCEDURE — G8753 SYS BP > OR = 140: HCPCS | Performed by: PODIATRIST

## 2021-04-27 NOTE — PROGRESS NOTES
HISTORY OF PRESENT ILLNESS  Esthela Herron is a 60 y.o. male is being seen in the office as a returning patient needing a diabetic foot exam.  Patient states he has been a diabetic for many years his most recent hemoglobin A1c was 8.2& (which is higher than his previous value of 6.2%). He states he does have close follow-up with his PCP (Dr. Sophie Vines). He denies any recent trauma but states he does have intermittent right heel pain. He states the pain rises to the level of 4 out of 10 and is worse with his first step in the morning. Patient also states his toenails are thick/discolored but denies having testing performed to confirm a diagnosis. He denies any systemic signs of infection. He denies any other lower extremity complaints    Review of Systems   Constitutional: Negative. HENT: Negative. Eyes: Positive for blurred vision. Respiratory: Negative. Cardiovascular: Negative. Gastrointestinal: Negative. Genitourinary: Negative. Musculoskeletal: Positive for back pain. Skin: Negative. Neurological: Positive for sensory change. Endo/Heme/Allergies: Negative. Psychiatric/Behavioral: Negative. All other systems reviewed and are negative.     Past Medical History:   Diagnosis Date    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    Atherosclerosis of arteries of extremities ([address] 7/15/2020    Cataract 7/15/2020    Coronary arteriosclerosis in native artery 7/15/2020    Diabetes mellitus type 2, controlled ([address] 7/15/2020    Diabetic mononeuropathy associated with type 2 diabetes mellitus ([address] 7/15/2020    Diabetic peripheral neuropathy ([address] 7/15/2020    EKG, abnormal 7/15/2020    Elevated liver enzymes 7/15/2020    Erectile dysfunction 7/15/2020    Essential hypertension 7/15/2020    Hepatitis     High cholesterol     Hyperlipidemia 7/15/2020    Hypertension     Pain in both lower legs 7/15/2020    Peripheral arterial occlusive disease ([address] 7/15/2020    Peripheral circulatory disorder associated with type 2 diabetes mellitus ([facility] 7/15/2020    Spasm of back muscles 7/15/2020    Ulcer of foot ([facility] 7/15/2020     Family History   Problem Relation Age of Onset    Diabetes Mother     Heart Disease Mother     Diabetes Father     Diabetes Sister     Diabetes Brother      Past Surgical History:   Procedure Laterality Date    IR STENT PLACEMENT      Right Leg     Social History     Socioeconomic History    Marital status: SINGLE     Spouse name: Not on file    Number of children: Not on file    Years of education: Not on file    Highest education level: Not on file   Occupational History    Not on file   Social Needs    Financial resource strain: Not on file    Food insecurity     Worry: Not on file     Inability: Not on file    Transportation needs     Medical: Not on file     Non-medical: Not on file   Tobacco Use    Smoking status: Never Smoker    Smokeless tobacco: Never Used   Substance and Sexual Activity    Alcohol use: Never     Frequency: Never     Binge frequency: Never    Drug use: Never    Sexual activity: Not on file   Lifestyle    Physical activity     Days per week: Not on file     Minutes per session: Not on file    Stress: Not on file   Relationships    Social connections     Talks on phone: Not on file     Gets together: Not on file     Attends [employer] service: Not on file     Active member of club or organization: Not on file     Attends meetings of clubs or organizations: Not on file     Relationship status: Not on file    Intimate partner violence     Fear of current or ex partner: Not on file     Emotionally abused: Not on file     Physically abused: Not on file     Forced sexual activity: Not on file   Other Topics Concern    Not on file   Social History Narrative    Not on file     Current Outpatient Medications   Medication Instructions    alcohol swabs (Alcohol Pads) padm Use to check glucose 3 times a day before meals    amoxicillin (AMOXIL) 500 mg, Oral    aspirin delayed-release (ASPIRIN LOW DOSE) 81 mg, Oral, DAILY    atorvastatin (LIPITOR) 40 mg, Oral, DAILY    blood pressure test kit (WRIST BLOOD PRESSURE MONITOR) Does Not Apply    Blood-Glucose Meter (Fora V12 Blood Glucose System) monitoring kit Use to check glucose 3 times a day before meals    capsaicin (CAPZASIN-HP) 0.1 % topical cream Topical, 3 TIMES DAILY    clopidogreL (PLAVIX) 75 mg, Oral, DAILY    cyclobenzaprine (FLEXERIL) 10 mg tablet Oral, 3 TIMES DAILY AS NEEDED    Diabetic Shoes XX Use every day    escitalopram oxalate (LEXAPRO) 5 mg tablet Oral, DAILY    escitalopram oxalate (LEXAPRO) 10 mg, Oral, DAILY    glucose blood VI test strips (Prodigy No Coding) strip TEST  GLUCOSE 4 times DAILY    hydroCHLOROthiazide (HYDRODIURIL) 25 mg, Oral, DAILY    ibuprofen (MOTRIN) 600 mg tablet Oral, EVERY 6 HOURS AS NEEDED    insulin aspart U-100 (NOVOLOG) 8 Units, SubCUTAneous, 4 TIMES DAILY BEFORE MEALS & NIGHTLY, Maximum daily dose 50 units.     Insulin Needles, Disposable, (Pen Needle) 32 gauge x 5/32\" ndle Use to inject insulin 4 times a day    Insulin Needles, Disposable, (Ultracare Pen Needle) 32 gauge x 1/4\" ndle Does Not Apply    insulin regular (NovoLIN R Regular U-100 Insuln) 100 unit/mL injection SubCUTAneous    insulin syr/ndl U100 half karely 0.3 mL 29 gauge x 1/2\" syrg Does Not Apply    Insulin Syringe-Needle U-100 (UltiCare) 0.3 mL 30 gauge x 1/2\" syrg Does Not Apply    Insulin Syringe-Needle U-100 0.3 mL 30 gauge x 5/16\" syrg Use to inject novolin in sliding scale as indicated 3 times per day before meals    labetaloL (NORMODYNE) 100 mg, Oral, 2 TIMES DAILY    lancets (Prodigy Twist Top Lancet) 28 gauge misc Does Not Apply    lancets-blood glucose strips (Fora [id number] strp-lnct) 30 gauge cmpk Use to check glucose 3 times a day before meals    Lantus Solostar U-100 Insulin 55 Units, SubCUTAneous, EVERY BEDTIME    losartan (COZAAR) 100 mg, Oral, DAILY    menthol (Cold and Hot Pain Relief) 5 % ptmd Apply Externally    miscellaneous medical supply misc For daily use    omega 3-DHA-EPA-fish oil 1,000 mg (120 mg-180 mg) capsule 1 Cap, Oral, DAILY    omega-3 acid ethyl esters (LOVAZA) 1,000 mg, Oral, 4 TIMES DAILY    Orlistat 60 mg, Oral, 3 TIMES DAILY WITH MEALS    sildenafiL (pulmonary hypertension) (REVATIO) 20 mg, Oral, 3 TIMES DAILY    Victoza 3-Alfredo 1.8 mg, SubCUTAneous, DAILY     No Known Allergies    Visit Vitals  BP (!) 140/86 (BP 1 Location: Left upper arm, BP Patient Position: Sitting, BP Cuff Size: Adult)   Pulse 68   Temp 97.1 °F (36.2 °C) (Temporal)   Ht 5' 9\" (1.753 m)   Wt 241 lb 6.4 oz (109.5 kg)   SpO2 99%   BMI 35.65 kg/m²     Physical Exam  Vitals signs reviewed. Constitutional:       Appearance: He is morbidly obese. HENT:      Head:      Comments: Normal dentition  Cardiovascular:      Pulses:           Dorsalis pedis pulses are 1+ on the right side and 1+ on the left side. Posterior tibial pulses are 1+ on the right side and 1+ on the left side. Pulmonary:      Effort: Pulmonary effort is normal.   Musculoskeletal:      Right ankle: He exhibits decreased range of motion. Achilles tendon exhibits no pain and no defect. Left ankle: He exhibits decreased range of motion. Achilles tendon exhibits no pain and no defect. Right lower leg: Edema present. Left lower leg: Edema present. Right foot: Normal range of motion. No deformity, bunion or Charcot foot. Left foot: Normal range of motion. No deformity, bunion or Charcot foot. Feet:      Right foot:      Protective Sensation: 10 sites tested. 10 sites sensed. Skin integrity: Skin integrity normal.      Toenail Condition: Right toenails are abnormally thick. Left foot:      Protective Sensation: 10 sites tested. 10 sites sensed.       Skin integrity: Skin integrity normal.      Toenail Condition: Left toenails are abnormally thick. Skin:     General: Skin is warm. Capillary Refill: Capillary refill takes 2 to 3 seconds. Comments: Absent pedal hair growth with hyperpigmentation noted to the skin   Neurological:      Mental Status: He is alert and oriented to person, place, and time. Comments: Paresthesias noted   Psychiatric:         Mood and Affect: Mood and affect normal.         Behavior: Behavior is cooperative. ASSESSMENT and PLAN  Diabetes mellitus, uncontrolled  Diabetic peripheral neuropathy  Plantar fasciitis, right foot  Onychomycosis      Discussed and educated patient regarding his current medical condition  Instructed patient to monitor their feet daily, be compliant with all medications and wear supportive shoe gear. A sharp toenail plate debridement was performed to all 10 pedal digits with a nail nipper without incident.   Patient tolerated well no dressing was needed  Encourage patient to perform his stretches daily, use of supportive shoe gear, ice ball therapy and anti-inflammatories

## 2021-04-27 NOTE — PROGRESS NOTES
1. Have you been to the ER, urgent care clinic since your last visit? Hospitalized since your last visit? No    2. Have you seen or consulted any other health care providers outside of the [address] [place] [address] since your last visit? Include any pap smears or colon screening.  No     Chief Complaint   Patient presents with    Diabetic Foot Exam     last pcp visit 03/22/2021;last A1c 8.2%; glucose today 144

## 2021-04-29 ENCOUNTER — OFFICE VISIT (OUTPATIENT)
Dept: UROLOGY | Age: 56
End: 2021-04-29
Payer: MEDICARE

## 2021-04-29 VITALS
HEIGHT: 69 IN | SYSTOLIC BLOOD PRESSURE: 121 MMHG | RESPIRATION RATE: 12 BRPM | TEMPERATURE: 97.1 F | BODY MASS INDEX: 35.4 KG/M2 | OXYGEN SATURATION: 98 % | HEART RATE: 68 BPM | WEIGHT: 239 LBS | DIASTOLIC BLOOD PRESSURE: 65 MMHG

## 2021-04-29 DIAGNOSIS — N52.8 OTHER MALE ERECTILE DYSFUNCTION: Primary | ICD-10-CM

## 2021-04-29 LAB
BILIRUB UR QL STRIP: NEGATIVE
GLUCOSE UR-MCNC: NEGATIVE MG/DL
KETONES P FAST UR STRIP-MCNC: NEGATIVE MG/DL
PH UR STRIP: 5.5 [PH] (ref 4.6–8)
PROT UR QL STRIP: NEGATIVE
SP GR UR STRIP: 1.02 (ref 1–1.03)
UA UROBILINOGEN AMB POC: NORMAL (ref 0.2–1)
URINALYSIS CLARITY POC: CLEAR
URINALYSIS COLOR POC: YELLOW
URINE BLOOD POC: NORMAL
URINE LEUKOCYTES POC: NEGATIVE
URINE NITRITES POC: NEGATIVE

## 2021-04-29 PROCEDURE — G8417 CALC BMI ABV UP PARAM F/U: HCPCS | Performed by: UROLOGY

## 2021-04-29 PROCEDURE — 81003 URINALYSIS AUTO W/O SCOPE: CPT | Performed by: UROLOGY

## 2021-04-29 PROCEDURE — 3017F COLORECTAL CA SCREEN DOC REV: CPT | Performed by: UROLOGY

## 2021-04-29 PROCEDURE — G8510 SCR DEP NEG, NO PLAN REQD: HCPCS | Performed by: UROLOGY

## 2021-04-29 PROCEDURE — G8427 DOCREV CUR MEDS BY ELIG CLIN: HCPCS | Performed by: UROLOGY

## 2021-04-29 PROCEDURE — G8752 SYS BP LESS 140: HCPCS | Performed by: UROLOGY

## 2021-04-29 PROCEDURE — 99214 OFFICE O/P EST MOD 30 MIN: CPT | Performed by: UROLOGY

## 2021-04-29 PROCEDURE — G8754 DIAS BP LESS 90: HCPCS | Performed by: UROLOGY

## 2021-04-29 RX ORDER — EMPAGLIFLOZIN 25 MG/1
TABLET, FILM COATED ORAL
COMMUNITY
Start: 2021-03-18 | End: 2021-04-29 | Stop reason: ALTCHOICE

## 2021-04-29 NOTE — PROGRESS NOTES
HPI ROS PE NOTE          History of Present Illness   Chief complaint: Erectile dysfunction  Michelet Damon is a 60 y.o. male who presents with erectile dysfunction, gradual deterioration since 2014 at which time patient had a CVA. The patient was given sildenafil 20 mg tablets which were not really effective, was tested for serum testosterone, 4/6/21nd had a value of 323, another earlier value 3/21/21 = 206. Patient had a conversation with his insurance carrier, he was advised that the oral form of sildenafil was not covered but that injectable sildenafil would be covered by his insurance. Was the patient's impression that this was an intracavernosal form of injection. The patient's other urological history involves mild bladder outlet obstruction due to the prostate International prostate symptom score administered by me today includes a total of 9, nocturia twice a night, intermittency less than half the time, incomplete emptying frequency urgency weak stream and straining to urinate less than 1 time in 5 voids. Patient denies previous history of kidney stones, urinary tract infection, family history of prostate cancer or other genitourinary malignancies and denies dysuria. He has on occasion had urgency incontinence. Bothered by complications of diabetes mellitus including peripheral neuropathy.      Past Medical History:   Diagnosis Date    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    Atherosclerosis of arteries of extremities ([address] 7/15/2020    Cataract 7/15/2020    Coronary arteriosclerosis in native artery 7/15/2020    Diabetes mellitus type 2, controlled ([address] 7/15/2020    Diabetic mononeuropathy associated with type 2 diabetes mellitus ([address] 7/15/2020    Diabetic peripheral neuropathy ([address] 7/15/2020    EKG, abnormal 7/15/2020    Elevated liver enzymes 7/15/2020    Erectile dysfunction 7/15/2020    Essential hypertension 7/15/2020    Hepatitis     High cholesterol     Hyperlipidemia 7/15/2020    Hypertension     Pain in both lower legs 7/15/2020    Peripheral arterial occlusive disease ([facility] 7/15/2020    Peripheral circulatory disorder associated with type 2 diabetes mellitus ([facility] 7/15/2020    Spasm of back muscles 7/15/2020    [facility]cer [facility] foot [facility] 7/15/2020      Past Surgical History:   Procedure Laterality Date    HX CATARACT REMOVAL Bilateral     HX ORTHOPAEDIC Right     stents placed    HX ORTHOPAEDIC Left     HX WISDOM TEETH EXTRACTION      IR STENT PLACEMENT      Right Leg     Family History   Problem Relation Age of Onset    Diabetes Mother     Heart Disease Mother     Diabetes Father     Diabetes Sister     Diabetes Brother       Social History     Tobacco Use    Smoking status: Never Smoker    Smokeless tobacco: Never Used   Substance Use Topics    Alcohol use: Not Currently     Frequency: Never     Binge frequency: Never       Prior to Admission medications    Medication Sig Start Date End Date Taking? Authorizing Provider   insulin glargine (Lantus Solostar U-100 Insulin) 100 unit/mL (3 mL) inpn 55 Units by SubCUTAneous route nightly for 270 days. Indications: type 2 diabetes mellitus 3/11/21 12/6/21 Yes Tyler Alamo MD   alcohol swabs (Alcohol Pads) padm Use to check glucose 3 times a day before meals 2/22/21  Yes Tyler Alamo MD   omega-3 acid ethyl esters (LOVAZA) 1 gram capsule Take 1 Cap by mouth four (4) times daily for 90 days. 2/22/21 5/23/21 Yes Tyler Alamo MD   Insulin Needles, Disposable, (Pen Needle) 32 gauge x 5/32\" ndle Use to inject insulin 4 times a day 2/4/21  Yes Tyler Alamo MD   glucose blood VI test strips (Prodigy No Coding) strip TEST  GLUCOSE 4 times DAILY 11/5/20  Yes Tyler Alamo MD   insulin aspart U-100 (NOVOLOG) 100 unit/mL (3 mL) inpn 8 Units by SubCUTAneous route Before breakfast, lunch, dinner and at bedtime for 270 days. Maximum daily dose 50 units. 10/29/20 7/26/21 Yes Cosmo Juan MD   Diabetic Shoes XX Use every day 10/20/20  Yes Cosmo Juan MD   aspirin delayed-release (Aspirin Low Dose) 81 mg tablet Take 1 Tab by mouth daily for 270 days. 10/8/20 7/5/21 Yes Cosmo Juan MD   atorvastatin (LIPITOR) 40 mg tablet Take 1 Tab by mouth daily for 270 days. 10/8/20 7/5/21 Yes Cosmo Juan MD   clopidogreL (PLAVIX) 75 mg tab Take 1 Tab by mouth daily for 270 days. 10/8/20 7/5/21 Yes Cosmo Juan MD   hydroCHLOROthiazide (HYDRODIURIL) 25 mg tablet Take 1 Tab by mouth daily for 270 days. 10/8/20 7/5/21 Yes Cosmo Juan MD   losartan (COZAAR) 100 mg tablet Take 1 Tab by mouth daily for 270 days. 10/8/20 7/5/21 Yes Cosmo Juan MD   liraglutide (Victoza 3-Alfredo) 0.6 mg/0.1 mL (18 mg/3 mL) pnij 1.8 mg by SubCUTAneous route daily for 270 days. 10/8/20 7/5/21 Yes Cosmo Juan MD   labetaloL (NORMODYNE) 100 mg tablet Take 1 Tab by mouth two (2) times a day for 270 days. 9/18/20 6/15/21 Yes Wero Marks MD   Insulin Syringe-Needle U-100 0.3 mL 30 gauge x 5/16\" syrg Use to inject novolin in sliding scale as indicated 3 times per day before meals 8/25/20  Yes Cosmo Juan MD   Blood-Glucose Meter U[employer] V12 Blood Glucose System) monitoring kit Use to check glucose 3 times a day before meals 8/10/20  Yes Cosmo Juan MD   lancets-blood glucose strips Elvin Gouge [id number] strp-lnct) 30 gauge cmpk Use to check glucose 3 times a day before meals 8/10/20  Yes Cosmo Juan MD   lancets (Prodigy Twist Top Lancet) 28 gauge misc by Does Not Apply route. Yes Provider, Historical   sildenafiL, pulmonary hypertension, (REVATIO) 20 mg tablet Take 20 mg by mouth three (3) times daily.    Yes Provider, Historical   Insulin Needles, Disposable, (Ultracare Pen Needle) 32 gauge x 1/4\" ndle by Does Not Apply route. Yes Provider, Historical   blood pressure test kit (WRIST BLOOD PRESSURE MONITOR) by Does Not Apply route. Yes Provider, Historical     No Known Allergies     Review of Systems:  Constitutional: negative  Respiratory: negative  Cardiovascular: negative  Gastrointestinal: positive for constipation  Genitourinary:positive for frequency, nocturia, urinary incontinence, decreased stream and Urgency intermittency, incomplete bladder emptying straining to urinate  Musculoskeletal:negative  Neurological: positive for paresthesia, gait problems and Peripheral neuropathy  Behavioral/Psychiatric: negative  Endocrine: positive for diabetic symptoms including Peripheral neuropathy     Physical Exam     Physical Exam:   Visit Vitals  /65 (BP 1 Location: Left upper arm, BP Patient Position: Sitting, BP Cuff Size: Large adult)   Pulse 68   Temp 97.1 °F (36.2 °C) (Temporal)   Resp 12   Ht 5' 9\" (1.753 m)   Wt 239 lb (108.4 kg)   SpO2 98%   BMI 35.29 kg/m²     General appearance: alert, cooperative, no distress, appears stated age  Head: Normocephalic, without obvious abnormality, atraumatic  Neck: supple, symmetrical, trachea midline, no adenopathy, thyroid: not enlarged, symmetric, no tenderness/mass/nodules, no carotid bruit and no JVD  Lungs: clear to auscultation bilaterally  Chest wall: no tenderness  Heart: regular rate and rhythm, S1, S2 normal, no murmur, click, rub or gallop  Abdomen: soft, non-tender.  Bowel sounds normal. No masses,  no organomegaly  Male genitalia: abnormal findings: atrophic testes right  Rectal: Prostate 30 g benign  Extremities: extremities normal, atraumatic, no cyanosis or edema  Skin: Skin color, texture, turgor normal. No rashes or lesions  Neurologic: Grossly normal    Data Review:   Recent Results (from the past 48 hour(s))   AMB POC URINALYSIS DIP STICK AUTO W/O MICRO    Collection Time: 04/29/21  9:26 AM Result Value Ref Range    Color (UA POC) Yellow     Clarity (UA POC) Clear     Glucose (UA POC) Negative Negative    Bilirubin (UA POC) Negative Negative    Ketones (UA POC) Negative Negative    Specific gravity (UA POC) 1.025 1.001 - 1.035    Blood (UA POC) Trace Negative    pH (UA POC) 5.5 4.6 - 8.0    Protein (UA POC) Negative Negative    Urobilinogen (UA POC) 0.2 mg/dL 0.2 - 1    Nitrites (UA POC) Negative Negative    Leukocyte esterase (UA POC) Negative Negative     No results for input(s): 48 in the last 72 hours. Assessment and Plan:   Erectile dysfunction, possible hypogonadism, this is unclear at the present time recommend subsequent recheck of serum testosterone. Patient is anxious to receive actable sildenafil, but considerable time taken by me to investigate this with call to [facility] and further investigation via the pharmacist regarding the use of intracavernosal sildenafil and this has apparently not been used to any significance or investigated that way. Apparently animal studies are the only studies that this is been used for. I discussed with him the use of tadalafil as an alternative to oral sildenafil. The patient is in earnest about having intracavernosal injections to provide a prompt sustainable erection. Therefore after an extended visit with consultations with pharmacy including face-to-face review of extended records from PCP discussion of alternatives of treatment I have provided him a prescription for trimix prostaglandin 40 mcg, phentolamine and papaverine. 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 No problem-specific Assessment & Plan notes found for this encounter. Mr. Minerva Rodgers has a reminder for a \"due or due soon\" health maintenance. I have asked that he contact his primary care provider for follow-up on this health maintenance.       Vijay Ewing Deanna Lind M.D.  4/29/2021

## 2021-04-29 NOTE — PROGRESS NOTES
Chief Complaint   Patient presents with    New Patient    Erectile Dysfunction     referred by Dr Deni Damon, records recieved. 1. Have you been to the ER, urgent care clinic since your last visit? Hospitalized since your last visit? No    2. Have you seen or consulted any other health care providers outside of the [address] [place] [address] since your last visit? Include any pap smears or colon screening.  Yes When: 3/22/21 Where: Dr Jonn Buocher office Reason for visit: yrly and ED    Visit Vitals  /65 (BP 1 Location: Left upper arm, BP Patient Position: Sitting, BP Cuff Size: Large adult)   Pulse 68   Temp 97.1 °F (36.2 °C) (Temporal)   Resp 12   Ht 5' 9\" (1.753 m)   Wt 239 lb (108.4 kg)   SpO2 98%   BMI 35.29 kg/m²

## 2021-05-20 ENCOUNTER — OFFICE VISIT (OUTPATIENT)
Dept: PODIATRY | Age: 56
End: 2021-05-20
Payer: MEDICARE

## 2021-05-20 VITALS
BODY MASS INDEX: 35.84 KG/M2 | OXYGEN SATURATION: 99 % | WEIGHT: 242 LBS | TEMPERATURE: 96.9 F | SYSTOLIC BLOOD PRESSURE: 139 MMHG | DIASTOLIC BLOOD PRESSURE: 84 MMHG | HEIGHT: 69 IN | HEART RATE: 65 BPM

## 2021-05-20 DIAGNOSIS — R23.4 FISSURE IN SKIN: Primary | ICD-10-CM

## 2021-05-20 PROCEDURE — G8427 DOCREV CUR MEDS BY ELIG CLIN: HCPCS | Performed by: PODIATRIST

## 2021-05-20 PROCEDURE — G8752 SYS BP LESS 140: HCPCS | Performed by: PODIATRIST

## 2021-05-20 PROCEDURE — 3017F COLORECTAL CA SCREEN DOC REV: CPT | Performed by: PODIATRIST

## 2021-05-20 PROCEDURE — 99213 OFFICE O/P EST LOW 20 MIN: CPT | Performed by: PODIATRIST

## 2021-05-20 PROCEDURE — G8417 CALC BMI ABV UP PARAM F/U: HCPCS | Performed by: PODIATRIST

## 2021-05-20 PROCEDURE — G8754 DIAS BP LESS 90: HCPCS | Performed by: PODIATRIST

## 2021-05-20 PROCEDURE — G8432 DEP SCR NOT DOC, RNG: HCPCS | Performed by: PODIATRIST

## 2021-05-20 RX ORDER — CHLORPHENIRAMINE MALEATE 4 MG
TABLET ORAL 2 TIMES DAILY
Qty: 15 G | Refills: 0 | Status: SHIPPED | OUTPATIENT
Start: 2021-05-20 | End: 2021-12-14 | Stop reason: ALTCHOICE

## 2021-05-20 NOTE — PROGRESS NOTES
HISTORY OF PRESENT ILLNESS  Adrian Medrano is a 60 y.o. male is being seen in the office as a returning patient with an acute complaint of a cut in his skin of his left foot. Patient states he noticed a cut a few days prior. He denies any overt trauma. He states he has mild associated pain, rising the level of 6 out of 10. He states the pain is located to the lateral aspect of his left foot and is nonradiating. He states the pain is exacerbated with weightbearing. He denies any overt local/systemic signs of infection. He denies any other pedal complaints    Review of Systems   Constitutional: Negative. HENT: Negative. Eyes: Positive for blurred vision. Respiratory: Negative. Cardiovascular: Negative. Gastrointestinal: Negative. Genitourinary: Negative. Musculoskeletal: Positive for back pain. Skin: Negative. Neurological: Positive for sensory change. Endo/Heme/Allergies: Negative. Psychiatric/Behavioral: Negative. All other systems reviewed and are negative.     Past Medical History:   Diagnosis Date    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    Atherosclerosis of arteries of extremities ([address] 7/15/2020    Cataract 7/15/2020    Coronary arteriosclerosis in native artery 7/15/2020    Diabetes mellitus type 2, controlled ([address] 7/15/2020    Diabetic mononeuropathy associated with type 2 diabetes mellitus ([address] 7/15/2020    Diabetic peripheral neuropathy ([address] 7/15/2020    EKG, abnormal 7/15/2020    Elevated liver enzymes 7/15/2020    Erectile dysfunction 7/15/2020    Essential hypertension 7/15/2020    Hepatitis     High cholesterol     Hyperlipidemia 7/15/2020    Hypertension     Pain in both lower legs 7/15/2020    Peripheral arterial occlusive disease ([address] 7/15/2020    Peripheral circulatory disorder associated with type 2 diabetes mellitus ([address] 7/15/2020    Spasm of back muscles 7/15/2020    Ulcer of foot ([address] 7/15/2020     Family History   Problem Relation Age of Onset    Diabetes Mother     Heart Disease Mother     Diabetes Father     Diabetes Sister     Diabetes Brother      Past Surgical History:   Procedure Laterality Date    HX CATARACT REMOVAL Bilateral     HX ORTHOPAEDIC Right     stents placed    HX ORTHOPAEDIC Left     HX WISDOM TEETH EXTRACTION      IR STENT PLACEMENT      Right Leg     Social History     Socioeconomic History    Marital status: SINGLE     Spouse name: Not on file    Number of children: Not on file    Years of education: Not on file    Highest education level: Not on file   Occupational History    Not on file   Tobacco Use    Smoking status: Never Smoker    Smokeless tobacco: Never Used   Vaping Use    Vaping Use: Never used   Substance and Sexual Activity    Alcohol use: Not Currently    Drug use: Never    Sexual activity: Yes     Partners: Female   Other Topics Concern    Not on file   Social History Narrative    Not on file     Social Determinants of Health     Financial Resource Strain:     Difficulty of Paying Living Expenses:    Food Insecurity:     Worried About Running Out of Food in the Last Year:     Ran Out of Food in the Last Year:    Transportation Needs:     Lack of Transportation (Medical):      Lack of Transportation (Non-Medical):    Physical Activity:     Days of Exercise per Week:     Minutes of Exercise per Session:    Stress:     Feeling of Stress :    Social Connections:     Frequency of Communication with Friends and Family:     Frequency of Social Gatherings with Friends and Family:     Attends Yazdanism Services:     Active Member of Clubs or Organizations:     Attends Club or Organization Meetings:     Marital Status:    Intimate Partner Violence:     Fear of Current or Ex-Partner:     Emotionally Abused:     Physically Abused:     Sexually Abused:      Current Outpatient Medications   Medication Instructions    alcohol swabs (Alcohol Pads) padm Use to check glucose 3 times a day before meals    aspirin delayed-release (ASPIRIN LOW DOSE) 81 mg, Oral, DAILY    atorvastatin (LIPITOR) 40 mg, Oral, DAILY    blood pressure test kit (WRIST BLOOD PRESSURE MONITOR) Does Not Apply    Blood-Glucose Meter (Fora V12 Blood Glucose System) monitoring kit Use to check glucose 3 times a day before meals    clopidogreL (PLAVIX) 75 mg, Oral, DAILY    Diabetic Shoes XX Use every day    glucose blood VI test strips (Prodigy No Coding) strip TEST  GLUCOSE 4 times DAILY    hydroCHLOROthiazide (HYDRODIURIL) 25 mg, Oral, DAILY    insulin aspart U-100 (NOVOLOG) 8 Units, SubCUTAneous, 4 TIMES DAILY BEFORE MEALS & NIGHTLY, Maximum daily dose 50 units.  Insulin Needles, Disposable, (Pen Needle) 32 gauge x 5/32\" ndle Use to inject insulin 4 times a day    Insulin Needles, Disposable, (Ultracare Pen Needle) 32 gauge x 1/4\" ndle Does Not Apply    Insulin Syringe-Needle U-100 0.3 mL 30 gauge x 5/16\" syrg Use to inject novolin in sliding scale as indicated 3 times per day before meals    labetaloL (NORMODYNE) 100 mg, Oral, 2 TIMES DAILY    lancets (Prodigy Twist Top Lancet) 28 gauge misc Does Not Apply    lancets-blood glucose strips (Fora [id number] strp-lnct) 30 gauge cmpk Use to check glucose 3 times a day before meals    Lantus Solostar U-100 Insulin 55 Units, SubCUTAneous, EVERY BEDTIME    losartan (COZAAR) 100 mg, Oral, DAILY    omega-3 acid ethyl esters (LOVAZA) 1,000 mg, Oral, 4 TIMES DAILY    sildenafiL (pulmonary hypertension) (REVATIO) 20 mg, 3 TIMES DAILY    Victoza 3-Alfredo 1.8 mg, SubCUTAneous, DAILY     No Known Allergies    Visit Vitals  /84 (BP 1 Location: Right upper arm, BP Patient Position: Sitting, BP Cuff Size: Large adult)   Pulse 65   Temp 96.9 °F (36.1 °C) (Temporal)   Ht 5' 9\" (1.753 m)   Wt 242 lb (109.8 kg)   SpO2 99%   BMI 35.74 kg/m²     Physical Exam  Vitals reviewed. Constitutional:       Appearance: He is morbidly obese.    HENT:      Head: Comments: Normal dentition  Cardiovascular:      Pulses:           Dorsalis pedis pulses are 1+ on the right side and 1+ on the left side. Posterior tibial pulses are 1+ on the right side and 1+ on the left side. Pulmonary:      Effort: Pulmonary effort is normal.   Musculoskeletal:      Right lower leg: Edema present. Left lower leg: Edema present. Right ankle: Decreased range of motion. Right Achilles Tendon: No defects. Left ankle: Decreased range of motion. Left Achilles Tendon: No defects. Right foot: Normal range of motion. No deformity, bunion or Charcot foot. Left foot: Normal range of motion. No deformity, bunion or Charcot foot. Feet:      Right foot:      Protective Sensation: 10 sites tested. 10 sites sensed. Skin integrity: Skin integrity normal.      Toenail Condition: Right toenails are abnormally thick. Left foot:      Protective Sensation: 10 sites tested. 10 sites sensed. Skin integrity: Skin integrity normal.      Toenail Condition: Left toenails are abnormally thick. Skin:     General: Skin is warm. Capillary Refill: Capillary refill takes 2 to 3 seconds. Comments: Absent pedal hair growth with hyperpigmentation noted to the skin   Neurological:      Mental Status: He is alert and oriented to person, place, and time. Comments: Paresthesias noted   Psychiatric:         Mood and Affect: Mood and affect normal.         Behavior: Behavior is cooperative.          ASSESSMENT and PLAN  Fissure of skin, left foot      Discussed and educated patient regarding his current medical condition  A prescription was given for clotrimazole 1% topical antifungal cream to be applied twice daily to affected area  He is to monitor and call the office immediately if his condition changes

## 2021-05-20 NOTE — PROGRESS NOTES
Chief Complaint   Patient presents with    Diabetic Foot Exam     pt states he has a cut on L foot would like to have it checked out; A1C-7.2 BS-121     1. Have you been to the ER, urgent care clinic since your last visit? Hospitalized since your last visit? No    2. Have you seen or consulted any other health care providers outside of the [address] [place] [address] since your last visit? Include any pap smears or colon screening.  No  PCP-Dr Cameron

## 2021-05-20 NOTE — TELEPHONE ENCOUNTER
Patient called stating that his insurance is requesting he get a new meter and test strips. Please fax prescription to [phone].

## 2021-05-26 RX ORDER — LANCETS
EACH MISCELLANEOUS
Qty: 200 EACH | Refills: 5 | Status: SHIPPED | OUTPATIENT
Start: 2021-05-26 | End: 2022-01-21

## 2021-05-26 RX ORDER — INSULIN PUMP SYRINGE, 3 ML
EACH MISCELLANEOUS
Qty: 1 KIT | Refills: 0 | Status: SHIPPED | OUTPATIENT
Start: 2021-05-26 | End: 2021-06-17

## 2021-05-26 RX ORDER — IBUPROFEN 200 MG
CAPSULE ORAL
Qty: 200 STRIP | Refills: 5 | Status: SHIPPED | OUTPATIENT
Start: 2021-05-26 | End: 2021-06-17

## 2021-05-27 PROBLEM — R23.4 FISSURE IN SKIN: Status: ACTIVE | Noted: 2021-05-27

## 2021-06-16 ENCOUNTER — TELEPHONE (OUTPATIENT)
Dept: INTERNAL MEDICINE CLINIC | Age: 56
End: 2021-06-16

## 2021-06-16 DIAGNOSIS — E11.42 CONTROLLED TYPE 2 DIABETES MELLITUS WITH DIABETIC POLYNEUROPATHY, WITH LONG-TERM CURRENT USE OF INSULIN (HCC): ICD-10-CM

## 2021-06-16 DIAGNOSIS — Z79.4 CONTROLLED TYPE 2 DIABETES MELLITUS WITH DIABETIC POLYNEUROPATHY, WITH LONG-TERM CURRENT USE OF INSULIN (HCC): ICD-10-CM

## 2021-06-16 NOTE — TELEPHONE ENCOUNTER
Patient called requesting a prescription be sent in for the following:    Prodigy Meter    Test Strips

## 2021-06-17 RX ORDER — BLOOD SUGAR DIAGNOSTIC
STRIP MISCELLANEOUS
Qty: 120 STRIP | Refills: 11 | Status: ON HOLD | OUTPATIENT
Start: 2021-06-17 | End: 2022-01-21 | Stop reason: SDUPTHER

## 2021-06-17 RX ORDER — INSULIN PUMP SYRINGE, 3 ML
EACH MISCELLANEOUS
Qty: 1 KIT | Refills: 0 | Status: SHIPPED | OUTPATIENT
Start: 2021-06-17 | End: 2022-01-21

## 2021-06-28 ENCOUNTER — TELEPHONE (OUTPATIENT)
Dept: INTERNAL MEDICINE CLINIC | Age: 56
End: 2021-06-28

## 2021-06-28 ENCOUNTER — TELEPHONE (OUTPATIENT)
Dept: UROLOGY | Age: 56
End: 2021-06-28

## 2021-06-28 DIAGNOSIS — N52.01 ERECTILE DYSFUNCTION DUE TO ARTERIAL INSUFFICIENCY: Primary | ICD-10-CM

## 2021-06-28 NOTE — TELEPHONE ENCOUNTER
Spoke with patient to r/s his appt with kristy to Dr. Hany roy and he also needed a refill on his penile injection and sent to the RX in [facility] he said Dr. Almas Carvalho had hand written him a prescprtion for it before he retired.  He asked once it is filled could someone call him to let him know

## 2021-06-28 NOTE — TELEPHONE ENCOUNTER
Spoke with patient to let him know per constantine that his medication cant get refilled until he see's the doctor first and  He said that was fine he would call back to check for cancellations everyday

## 2021-06-29 RX ORDER — SILDENAFIL 50 MG/1
50 TABLET, FILM COATED ORAL AS NEEDED
Qty: 20 TABLET | Refills: 0 | Status: SHIPPED | OUTPATIENT
Start: 2021-06-29 | End: 2022-01-21

## 2021-07-01 DIAGNOSIS — I10 ESSENTIAL HYPERTENSION: ICD-10-CM

## 2021-07-02 RX ORDER — LABETALOL 100 MG/1
TABLET, FILM COATED ORAL
Qty: 180 TABLET | Refills: 2 | Status: ON HOLD | OUTPATIENT
Start: 2021-07-02 | End: 2022-01-21 | Stop reason: SDUPTHER

## 2021-07-06 ENCOUNTER — TELEPHONE (OUTPATIENT)
Dept: INTERNAL MEDICINE CLINIC | Age: 56
End: 2021-07-06

## 2021-07-06 NOTE — TELEPHONE ENCOUNTER
Pt called and wants a refill on his Viagra I told him that he was prescribed 20 pills on 6/29/2021. He said that he was taking this medication multpile time throughout the day this weekend and has run out. I will let him know to make an appointment to urology.

## 2021-07-06 NOTE — TELEPHONE ENCOUNTER
Patient called to request refill for Viagra. I informed patient that the order was sent by pharmacy yesterday and waiting for provider to sign off.

## 2021-07-19 ENCOUNTER — TELEPHONE (OUTPATIENT)
Dept: INTERNAL MEDICINE CLINIC | Age: 56
End: 2021-07-19

## 2021-07-19 NOTE — TELEPHONE ENCOUNTER
Pt called and said that his ear is bleeding we have availability today or tomorrow. I told him to go to an urgent care and he said that he does not have transportation.  I told him that if he does not have transportation to call 911 and be evaluated in the ER

## 2021-08-20 PROBLEM — Z12.5 PROSTATE CANCER SCREENING: Status: ACTIVE | Noted: 2021-08-20

## 2021-08-20 PROBLEM — R79.89 LOW TESTOSTERONE: Status: ACTIVE | Noted: 2021-08-20

## 2021-10-05 DIAGNOSIS — E11.42 CONTROLLED TYPE 2 DIABETES MELLITUS WITH DIABETIC POLYNEUROPATHY, WITH LONG-TERM CURRENT USE OF INSULIN (HCC): ICD-10-CM

## 2021-10-05 DIAGNOSIS — Z79.4 CONTROLLED TYPE 2 DIABETES MELLITUS WITH DIABETIC POLYNEUROPATHY, WITH LONG-TERM CURRENT USE OF INSULIN (HCC): ICD-10-CM

## 2021-10-13 ENCOUNTER — TELEPHONE (OUTPATIENT)
Dept: INTERNAL MEDICINE CLINIC | Age: 56
End: 2021-10-13

## 2021-10-13 DIAGNOSIS — Z79.4 CONTROLLED TYPE 2 DIABETES MELLITUS WITH DIABETIC POLYNEUROPATHY, WITH LONG-TERM CURRENT USE OF INSULIN (HCC): ICD-10-CM

## 2021-10-13 DIAGNOSIS — E11.42 CONTROLLED TYPE 2 DIABETES MELLITUS WITH DIABETIC POLYNEUROPATHY, WITH LONG-TERM CURRENT USE OF INSULIN (HCC): ICD-10-CM

## 2021-10-13 NOTE — TELEPHONE ENCOUNTER
Called pt lmvm to call back to schedule a follow up appointment in order to continue receiving your medication refills.

## 2021-10-21 ENCOUNTER — TELEPHONE (OUTPATIENT)
Dept: INTERNAL MEDICINE CLINIC | Age: 56
End: 2021-10-21

## 2021-10-21 DIAGNOSIS — E11.65 UNCONTROLLED TYPE 2 DIABETES MELLITUS WITH HYPERGLYCEMIA (HCC): Primary | ICD-10-CM

## 2021-10-21 NOTE — TELEPHONE ENCOUNTER
Patient called upset was told he could not get his refill since he has not been seen. I told him I do not see where it was ever requested by the pharmacy. victoza 1.8mg flex pen once per day in the morning    I actually do not see where you order this but the patient was already upset so I did not push it.     Please send to Carlos  [facility]. 3/22/21  NOV 11/18/21

## 2021-11-15 DIAGNOSIS — I10 ESSENTIAL HYPERTENSION: ICD-10-CM

## 2021-11-19 RX ORDER — HYDROCHLOROTHIAZIDE 25 MG/1
TABLET ORAL
Qty: 90 TABLET | Refills: 0 | Status: ON HOLD | OUTPATIENT
Start: 2021-11-19 | End: 2022-01-21 | Stop reason: SDUPTHER

## 2021-11-26 RX ORDER — LANCETS 33 GAUGE
EACH MISCELLANEOUS
Qty: 100 LANCET | Refills: 4 | Status: SHIPPED | OUTPATIENT
Start: 2021-11-26 | End: 2022-01-21

## 2021-12-06 DIAGNOSIS — E11.42 CONTROLLED TYPE 2 DIABETES MELLITUS WITH DIABETIC POLYNEUROPATHY, WITH LONG-TERM CURRENT USE OF INSULIN (HCC): ICD-10-CM

## 2021-12-06 DIAGNOSIS — Z79.4 CONTROLLED TYPE 2 DIABETES MELLITUS WITH DIABETIC POLYNEUROPATHY, WITH LONG-TERM CURRENT USE OF INSULIN (HCC): ICD-10-CM

## 2021-12-06 NOTE — TELEPHONE ENCOUNTER
Please send attached Rx request.    Pharmacy is also telling him his insurance does not cover lancets order but we can send a new Rx for a True Metrix meter, strips and lancets.

## 2021-12-08 ENCOUNTER — TELEPHONE (OUTPATIENT)
Dept: INTERNAL MEDICINE CLINIC | Age: 56
End: 2021-12-08

## 2021-12-08 DIAGNOSIS — I77.9 PERIPHERAL ARTERIAL OCCLUSIVE DISEASE (HCC): ICD-10-CM

## 2021-12-08 DIAGNOSIS — Z79.4 CONTROLLED TYPE 2 DIABETES MELLITUS WITH DIABETIC POLYNEUROPATHY, WITH LONG-TERM CURRENT USE OF INSULIN (HCC): ICD-10-CM

## 2021-12-08 DIAGNOSIS — I10 ESSENTIAL HYPERTENSION: ICD-10-CM

## 2021-12-08 DIAGNOSIS — E11.42 CONTROLLED TYPE 2 DIABETES MELLITUS WITH DIABETIC POLYNEUROPATHY, WITH LONG-TERM CURRENT USE OF INSULIN (HCC): ICD-10-CM

## 2021-12-08 RX ORDER — INSULIN GLARGINE 100 [IU]/ML
55 INJECTION, SOLUTION SUBCUTANEOUS
Qty: 49.5 ML | Refills: 2 | Status: SHIPPED | OUTPATIENT
Start: 2021-12-08 | End: 2021-12-14 | Stop reason: SDUPTHER

## 2021-12-08 RX ORDER — LOSARTAN POTASSIUM 100 MG/1
100 TABLET ORAL DAILY
Qty: 90 TABLET | Refills: 2 | Status: SHIPPED | OUTPATIENT
Start: 2021-12-08 | End: 2022-01-21

## 2021-12-08 NOTE — TELEPHONE ENCOUNTER
Patient came in today requesting an Rx to be sent to [facility] Crabtree for Humlog flex pen

## 2021-12-08 NOTE — TELEPHONE ENCOUNTER
Patient came by office requesting a refill for the following medication:    Losartan Potassium 100 MG

## 2021-12-09 ENCOUNTER — TELEPHONE (OUTPATIENT)
Dept: INTERNAL MEDICINE CLINIC | Age: 56
End: 2021-12-09

## 2021-12-09 DIAGNOSIS — E11.65 UNCONTROLLED TYPE 2 DIABETES MELLITUS WITH HYPERGLYCEMIA (HCC): ICD-10-CM

## 2021-12-09 RX ORDER — INSULIN ASPART 100 [IU]/ML
INJECTION, SOLUTION INTRAVENOUS; SUBCUTANEOUS
Qty: 30 ML | Refills: 2 | Status: SHIPPED | OUTPATIENT
Start: 2021-12-09 | End: 2022-01-21

## 2021-12-09 RX ORDER — CLOPIDOGREL BISULFATE 75 MG/1
TABLET ORAL
Qty: 90 TABLET | Refills: 2 | Status: ON HOLD | OUTPATIENT
Start: 2021-12-09 | End: 2022-01-21 | Stop reason: SDUPTHER

## 2021-12-09 NOTE — TELEPHONE ENCOUNTER
Patient came by office requesting a refill for the following medication:    liraglutide (VICTOZA) 0.6 mg/0.1 mL (18 mg/3 mL) pnij 162 mg 2 10/22/2021 7/19/2022    Sig - Route: 1.8 mg by SubCUTAneous route daily for 270 days. - SubCUTAneous      Patient states he takes this in the morning

## 2021-12-14 ENCOUNTER — OFFICE VISIT (OUTPATIENT)
Dept: INTERNAL MEDICINE CLINIC | Age: 56
End: 2021-12-14
Payer: MEDICARE

## 2021-12-14 VITALS
BODY MASS INDEX: 34.83 KG/M2 | WEIGHT: 235.2 LBS | SYSTOLIC BLOOD PRESSURE: 113 MMHG | HEIGHT: 69 IN | RESPIRATION RATE: 18 BRPM | TEMPERATURE: 98.7 F | DIASTOLIC BLOOD PRESSURE: 68 MMHG | HEART RATE: 78 BPM | OXYGEN SATURATION: 96 %

## 2021-12-14 DIAGNOSIS — E11.65 UNCONTROLLED TYPE 2 DIABETES MELLITUS WITH HYPERGLYCEMIA (HCC): Primary | ICD-10-CM

## 2021-12-14 DIAGNOSIS — I77.9 PERIPHERAL ARTERIAL OCCLUSIVE DISEASE (HCC): ICD-10-CM

## 2021-12-14 DIAGNOSIS — Z79.4 CONTROLLED TYPE 2 DIABETES MELLITUS WITH DIABETIC POLYNEUROPATHY, WITH LONG-TERM CURRENT USE OF INSULIN (HCC): ICD-10-CM

## 2021-12-14 DIAGNOSIS — I10 ESSENTIAL HYPERTENSION: ICD-10-CM

## 2021-12-14 DIAGNOSIS — E11.42 CONTROLLED TYPE 2 DIABETES MELLITUS WITH DIABETIC POLYNEUROPATHY, WITH LONG-TERM CURRENT USE OF INSULIN (HCC): ICD-10-CM

## 2021-12-14 LAB — HBA1C MFR BLD HPLC: 7.7 %

## 2021-12-14 PROCEDURE — G8510 SCR DEP NEG, NO PLAN REQD: HCPCS | Performed by: INTERNAL MEDICINE

## 2021-12-14 PROCEDURE — 83036 HEMOGLOBIN GLYCOSYLATED A1C: CPT | Performed by: INTERNAL MEDICINE

## 2021-12-14 PROCEDURE — G8417 CALC BMI ABV UP PARAM F/U: HCPCS | Performed by: INTERNAL MEDICINE

## 2021-12-14 PROCEDURE — 3051F HG A1C>EQUAL 7.0%<8.0%: CPT | Performed by: INTERNAL MEDICINE

## 2021-12-14 PROCEDURE — 99214 OFFICE O/P EST MOD 30 MIN: CPT | Performed by: INTERNAL MEDICINE

## 2021-12-14 PROCEDURE — G8754 DIAS BP LESS 90: HCPCS | Performed by: INTERNAL MEDICINE

## 2021-12-14 PROCEDURE — G8427 DOCREV CUR MEDS BY ELIG CLIN: HCPCS | Performed by: INTERNAL MEDICINE

## 2021-12-14 PROCEDURE — 3017F COLORECTAL CA SCREEN DOC REV: CPT | Performed by: INTERNAL MEDICINE

## 2021-12-14 PROCEDURE — G8752 SYS BP LESS 140: HCPCS | Performed by: INTERNAL MEDICINE

## 2021-12-14 PROCEDURE — 2022F DILAT RTA XM EVC RTNOPTHY: CPT | Performed by: INTERNAL MEDICINE

## 2021-12-14 RX ORDER — PEN NEEDLE, DIABETIC 31 GX3/16"
NEEDLE, DISPOSABLE MISCELLANEOUS
Qty: 200 PEN NEEDLE | Refills: 5 | Status: SHIPPED | OUTPATIENT
Start: 2021-12-14 | End: 2022-01-21

## 2021-12-14 RX ORDER — INSULIN GLARGINE 100 [IU]/ML
60 INJECTION, SOLUTION SUBCUTANEOUS
Qty: 54 ML | Refills: 2 | Status: SHIPPED | OUTPATIENT
Start: 2021-12-14 | End: 2022-01-21

## 2021-12-14 NOTE — PROGRESS NOTES
1. Have you been to the ER, urgent care clinic since your last visit? Hospitalized since your last visit? No    2. Have you seen or consulted any other health care providers outside of the [address] [place] [address] since your last visit? Include any pap smears or colon screening.  No     Chief Complaint   Patient presents with    Follow-up    Hypertension    Diabetes     Pt states that he is here for a f/u visit

## 2021-12-14 NOTE — PROGRESS NOTES
Jayne Phan is a 60 y.o. male and presents with Follow-up, Hypertension, and Diabetes    He needs handicap sticker for [facility], he says when he goes to grocery store he rides sccoter he can tnot walk long distances due to diabetic neuropathy. Continues having numbness in 3 and 4 fingers of right hand, he says he does not want gabapentin or lyrica, he does not want to experience the side effects. He says he was called by  for his Diabetic shoes  No glucose diary available. He continues 1.8 mg victoza daily, Lantus 55 units, novolog in low dose sliding scale   He has appointment with eye doctor [facility] in January    He did FIT test negative with his insurance he thinks last year, I never got result. He thinks it was with [employer]. Review of Systems  Review of Systems   Constitutional: Negative for chills, fatigue, fever and unexpected weight change. HENT: Negative for congestion, ear pain, sneezing and sore throat. Eyes: Negative for pain and discharge. Respiratory: Negative for cough, shortness of breath and wheezing. Cardiovascular: Negative for chest pain, palpitations and leg swelling. Gastrointestinal: Negative for abdominal pain, blood in stool, constipation and diarrhea. Endocrine: Negative for polydipsia and polyuria. Genitourinary: Negative for difficulty urinating, dysuria, frequency, hematuria and urgency. Musculoskeletal: Negative for arthralgias, back pain and joint swelling. Skin: Negative for rash. Allergic/Immunologic: Negative for environmental allergies and food allergies. Neurological: Negative for dizziness, speech difficulty, weakness, light-headedness, numbness and headaches. Hematological: Negative for adenopathy. Psychiatric/Behavioral: Negative for behavioral problems (Depression), sleep disturbance and suicidal ideas.           Past Medical History:   Diagnosis Date    Arteriosclerotic gangrene ([facility] 7/15/2020    Atherosclerosis of arteries of extremities ([address] 7/15/2020    Cataract 7/15/2020    Coronary arteriosclerosis in native artery 7/15/2020    Diabetes mellitus type 2, controlled ([address] 7/15/2020    Diabetic mononeuropathy associated with type 2 diabetes mellitus ([address] 7/15/2020    Diabetic peripheral neuropathy ([address] 7/15/2020    EKG, abnormal 7/15/2020    Elevated liver enzymes 7/15/2020    Erectile dysfunction 7/15/2020    Essential hypertension 7/15/2020    Hepatitis     High cholesterol     Hyperlipidemia 7/15/2020    Hypertension     Pain in both lower legs 7/15/2020    Peripheral arterial occlusive disease ([address] 7/15/2020    Peripheral circulatory disorder associated with type 2 diabetes mellitus ([address] 7/15/2020    Spasm of back muscles 7/15/2020    Ulcer of foot ([address] 7/15/2020     Past Surgical History:   Procedure Laterality Date    HX CATARACT REMOVAL Bilateral     HX ORTHOPAEDIC Right     stents placed    HX ORTHOPAEDIC Left     HX WISDOM TEETH EXTRACTION      IR STENT PLACEMENT      Right Leg     Social History     Socioeconomic History    Marital status: SINGLE   Tobacco Use    Smoking status: Never Smoker    Smokeless tobacco: Never Used   Vaping Use    Vaping Use: Never used   Substance and Sexual Activity    Alcohol use: Not Currently    Drug use: Never    Sexual activity: Yes     Partners: Female     Family History   Problem Relation Age of Onset    Diabetes Mother     Heart Disease Mother     Diabetes Father     Diabetes Sister     Diabetes Brother      Current Outpatient Medications   Medication Sig Dispense Refill    insulin glargine (Lantus Solostar U-100 Insulin) 100 unit/mL (3 mL) inpn 60 Units by SubCUTAneous route nightly for 270 days.  Indications: type 2 diabetes mellitus 54 mL 2    Insulin Needles, Disposable, (Pen Needle) 32 gauge x 5/32\" ndle Use to inject insulin 4 times a day 200 Pen Needle 5    liraglutide (VICTOZA) 0.6 mg/0.1 mL (18 mg/3 mL) pnij 1.8 mg by SubCUTAneous route daily for 270 days. 162 mg 2    NovoLOG Flexpen U-100 Insulin 100 unit/mL (3 mL) inpn INJECT 2 UNITS PER 50u above 150mg/dl 3 times daily before meals as needed according to sliding scale. max of 36 units daily (84 day supply) 30 mL 2    clopidogreL (PLAVIX) 75 mg tab TAKE ONE TABLET BY MOUTH EVERY DAY 90 Tablet 2    losartan (COZAAR) 100 mg tablet Take 1 Tablet by mouth daily for 270 days. 90 Tablet 2    OneTouch Delica Plus Lancet 33 gauge misc use to check blood sugar FOUR TIMES DAILY 100 Lancet 4    hydroCHLOROthiazide (HYDRODIURIL) 25 mg tablet TAKE ONE TABLET BY MOUTH EVERY DAY 90 Tablet 0    labetaloL (NORMODYNE) 100 mg tablet TAKE ONE TABLET BY MOUTH TWICE DAILY FOR 90 DAYS 180 Tablet 2    sildenafil citrate (VIAGRA) 50 mg tablet Take 1 Tablet by mouth as needed for Erectile Dysfunction. 1 tablet 30 minutes before intercourse, no more than 1 a day. 20 Tablet 0    glucose blood VI test strips (Prodigy No Coding) strip TEST  GLUCOSE 4 times DAILY 120 Strip 11    Blood-Glucose Meter (Prodigy Autocode Meter) monitoring kit Use to check blood glucose 4 times a day 1 Kit 0    lancets misc Use one lancet QID to check blood sugar. Dx: E11.65 200 Each 5    alcohol swabs (Alcohol Pads) padm Use to check glucose 3 times a day before meals 100 Pad 11    Insulin Syringe-Needle U-100 0.3 mL 30 gauge x 5/16\" syrg Use to inject novolin in sliding scale as indicated 3 times per day before meals 100 Syringe 11    lancets-blood glucose strips (Fora [id number] strp-lnct) 30 gauge cmpk Use to check glucose 3 times a day before meals 1 Dose Pack 11    lancets (Prodigy Twist Top Lancet) 28 gauge misc by Does Not Apply route.  blood pressure test kit (WRIST BLOOD PRESSURE MONITOR) by Does Not Apply route.       insulin regular (HumuLIN R Regular U-100 Insuln) 100 unit/mL injection INJECT SUBCUTANEOUSLY THREE TIMES DAILY BEFORE MEALS per sliding scale, max 12 units per dose and 36 units per day (Patient not taking: Reported on 12/14/2021) 10 mL 3    Diabetic Shoes XX Use every day (Patient not taking: Reported on 12/14/2021) 2 Device 1     No Known Allergies    Objective:  Visit Vitals  /68 (BP 1 Location: Left upper arm, BP Patient Position: Sitting, BP Cuff Size: Adult)   Pulse 78   Temp 98.7 °F (37.1 °C) (Oral)   Resp 18   Ht 5' 9\" (1.753 m)   Wt 235 lb 3.2 oz (106.7 kg)   SpO2 96% Comment: RA   BMI 34.73 kg/m²     Physical Exam:   Physical Exam  Constitutional:       General: He is not in acute distress. Appearance: Normal appearance. He is obese. HENT:      Head: Normocephalic and atraumatic. Mouth/Throat:      Mouth: Mucous membranes are moist.   Eyes:      Extraocular Movements: Extraocular movements intact. Conjunctiva/sclera: Conjunctivae normal.      Pupils: Pupils are equal, round, and reactive to light. Cardiovascular:      Rate and Rhythm: Normal rate and regular rhythm. Pulses: Normal pulses. Heart sounds: Normal heart sounds. Pulmonary:      Effort: Pulmonary effort is normal.      Breath sounds: Normal breath sounds. Abdominal:      General: Abdomen is flat. Bowel sounds are normal. There is no distension. Palpations: Abdomen is soft. There is no mass. Tenderness: There is no abdominal tenderness. Musculoskeletal:         General: No swelling or deformity. Cervical back: Normal range of motion and neck supple. Right lower leg: No edema. Left lower leg: No edema. Lymphadenopathy:      Cervical: No cervical adenopathy. Skin:     General: Skin is warm and dry. Capillary Refill: Capillary refill takes less than 2 seconds. Coloration: Skin is not jaundiced or pale. Findings: No erythema or rash. Neurological:      General: No focal deficit present. Mental Status: He is alert and oriented to person, place, and time.    Psychiatric:         Mood and Affect: Mood normal.         Behavior: Behavior normal.         Thought Content: Thought content normal.         Judgment: Judgment normal.          Results for orders placed or performed in visit on 21   AMB POC HEMOGLOBIN A1C   Result Value Ref Range    Hemoglobin A1c (POC) 7.7 %       Assessment/Plan:    A1c Is better but not at goal, increase Lantus to 60 units, continue novolog on sliding scale and victoza. Asked him to bring glucose diary to next visit. He has changed his diet, habits, has lost some weight, encouraged to to continue   Gave him form for [facility] for handicap sticker for neuropathy and difficulty walking. ICD-10-CM ICD-9-CM    1. Uncontrolled type 2 diabetes mellitus with hyperglycemia (HCC)  E11.65 250.02 AMB POC HEMOGLOBIN A1C   2. Controlled type 2 diabetes mellitus with diabetic polyneuropathy, with long-term current use of insulin (HCC)  E11.42 250.60 insulin glargine (Lantus Solostar U-100 Insulin) 100 unit/mL (3 mL) inpn    Z79.4 357.2 Insulin Needles, Disposable, (Pen Needle) 32 gauge x 5/32\" ndle     V58.67    3. Essential hypertension  I10 401.9    4. Peripheral arterial occlusive disease (HCC)  I77.9 444.22      Orders Placed This Encounter    AMB POC HEMOGLOBIN A1C    insulin glargine (Lantus Solostar U-100 Insulin) 100 unit/mL (3 mL) inpn     Si Units by SubCUTAneous route nightly for 270 days. Indications: type 2 diabetes mellitus     Dispense:  54 mL     Refill:  2     This is for information that I have increased dose to 60 units, I know he received a refill last week, no need to refill again. Thanks    Insulin Needles, Disposable, (Pen Needle) 32 gauge x 5/32\" ndle     Sig: Use to inject insulin 4 times a day     Dispense:  200 Pen Needle     Refill:  5     follow low fat diet, follow low salt diet, call if any problems, bring glucose diary   There are no Patient Instructions on file for this visit. Follow-up and Dispositions    · Return in about 6 months (around 2022).

## 2021-12-22 DIAGNOSIS — E11.65 UNCONTROLLED TYPE 2 DIABETES MELLITUS WITH HYPERGLYCEMIA (HCC): Primary | ICD-10-CM

## 2022-01-16 ENCOUNTER — HOSPITAL ENCOUNTER (INPATIENT)
Age: 57
LOS: 5 days | Discharge: HOME | DRG: 885 | End: 2022-01-21
Attending: PSYCHIATRY & NEUROLOGY | Admitting: PSYCHIATRY & NEUROLOGY
Payer: MEDICARE

## 2022-01-16 DIAGNOSIS — F14.10 COCAINE ABUSE (HCC): ICD-10-CM

## 2022-01-16 DIAGNOSIS — Z79.4 CONTROLLED TYPE 2 DIABETES MELLITUS WITH DIABETIC POLYNEUROPATHY, WITH LONG-TERM CURRENT USE OF INSULIN (HCC): ICD-10-CM

## 2022-01-16 DIAGNOSIS — E11.42 CONTROLLED TYPE 2 DIABETES MELLITUS WITH DIABETIC POLYNEUROPATHY, WITH LONG-TERM CURRENT USE OF INSULIN (HCC): ICD-10-CM

## 2022-01-16 DIAGNOSIS — R45.851 SUICIDE IDEATION: Primary | ICD-10-CM

## 2022-01-16 DIAGNOSIS — I10 ESSENTIAL HYPERTENSION: ICD-10-CM

## 2022-01-16 DIAGNOSIS — I77.9 PERIPHERAL ARTERIAL OCCLUSIVE DISEASE (HCC): ICD-10-CM

## 2022-01-16 PROBLEM — F32.A DEPRESSION: Status: ACTIVE | Noted: 2022-01-16

## 2022-01-16 PROBLEM — F32.9 MDD (MAJOR DEPRESSIVE DISORDER): Status: ACTIVE | Noted: 2022-01-16

## 2022-01-16 LAB
ALBUMIN SERPL-MCNC: 3.6 G/DL (ref 3.5–5)
ALBUMIN/GLOB SERPL: 1.1 {RATIO} (ref 1.1–2.2)
ALP SERPL-CCNC: 62 U/L (ref 45–117)
ALT SERPL-CCNC: 35 U/L (ref 12–78)
ANION GAP SERPL CALC-SCNC: 3 MMOL/L (ref 5–15)
APAP SERPL-MCNC: <10 UG/ML (ref 10–30)
APPEARANCE UR: CLEAR
AST SERPL W P-5'-P-CCNC: 33 U/L (ref 15–37)
BACTERIA URNS QL MICRO: NEGATIVE /HPF
BASOPHILS # BLD: 0 K/UL (ref 0–0.1)
BASOPHILS NFR BLD: 1 % (ref 0–1)
BILIRUB SERPL-MCNC: 0.5 MG/DL (ref 0.2–1)
BILIRUB UR QL: NEGATIVE
BUN SERPL-MCNC: 15 MG/DL (ref 6–20)
BUN/CREAT SERPL: 15 (ref 12–20)
CA-I BLD-MCNC: 9.4 MG/DL (ref 8.5–10.1)
CHLORIDE SERPL-SCNC: 105 MMOL/L (ref 97–108)
CO2 SERPL-SCNC: 32 MMOL/L (ref 21–32)
COLOR UR: ABNORMAL
CREAT SERPL-MCNC: 0.98 MG/DL (ref 0.7–1.3)
DIFFERENTIAL METHOD BLD: NORMAL
EOSINOPHIL # BLD: 0 K/UL (ref 0–0.4)
EOSINOPHIL NFR BLD: 1 % (ref 0–7)
ERYTHROCYTE [DISTWIDTH] IN BLOOD BY AUTOMATED COUNT: 11.5 % (ref 11.5–14.5)
FLUAV RNA SPEC QL NAA+PROBE: NOT DETECTED
FLUBV RNA SPEC QL NAA+PROBE: NOT DETECTED
GLOBULIN SER CALC-MCNC: 3.4 G/DL (ref 2–4)
GLUCOSE BLD STRIP.AUTO-MCNC: 245 MG/DL (ref 65–117)
GLUCOSE SERPL-MCNC: 177 MG/DL (ref 65–100)
GLUCOSE UR STRIP.AUTO-MCNC: NEGATIVE MG/DL
HCT VFR BLD AUTO: 41.9 % (ref 36.6–50.3)
HGB BLD-MCNC: 14.2 G/DL (ref 12.1–17)
HGB UR QL STRIP: NEGATIVE
IMM GRANULOCYTES # BLD AUTO: 0 K/UL (ref 0–0.04)
IMM GRANULOCYTES NFR BLD AUTO: 0 % (ref 0–0.5)
KETONES UR QL STRIP.AUTO: 5 MG/DL
LEUKOCYTE ESTERASE UR QL STRIP.AUTO: NEGATIVE
LYMPHOCYTES # BLD: 1.5 K/UL (ref 0.8–3.5)
LYMPHOCYTES NFR BLD: 23 % (ref 12–49)
MCH RBC QN AUTO: 29 PG (ref 26–34)
MCHC RBC AUTO-ENTMCNC: 33.9 G/DL (ref 30–36.5)
MCV RBC AUTO: 85.5 FL (ref 80–99)
MONOCYTES # BLD: 0.6 K/UL (ref 0–1)
MONOCYTES NFR BLD: 9 % (ref 5–13)
NEUTS SEG # BLD: 4.4 K/UL (ref 1.8–8)
NEUTS SEG NFR BLD: 66 % (ref 32–75)
NITRITE UR QL STRIP.AUTO: NEGATIVE
NRBC # BLD: 0 K/UL (ref 0–0.01)
NRBC BLD-RTO: 0 PER 100 WBC
PERFORMED BY, TECHID: ABNORMAL
PH UR STRIP: 5 [PH] (ref 5–8)
PLATELET # BLD AUTO: 329 K/UL (ref 150–400)
PMV BLD AUTO: 11 FL (ref 8.9–12.9)
POTASSIUM SERPL-SCNC: 3.7 MMOL/L (ref 3.5–5.1)
PROT SERPL-MCNC: 7 G/DL (ref 6.4–8.2)
PROT UR STRIP-MCNC: NEGATIVE MG/DL
RBC # BLD AUTO: 4.9 M/UL (ref 4.1–5.7)
RBC #/AREA URNS HPF: ABNORMAL /HPF (ref 0–5)
SALICYLATES SERPL-MCNC: <1.7 MG/DL (ref 2.8–20)
SARS-COV-2, COV2: DETECTED
SODIUM SERPL-SCNC: 140 MMOL/L (ref 136–145)
SP GR UR REFRACTOMETRY: 1.01 (ref 1–1.03)
UROBILINOGEN UR QL STRIP.AUTO: 0.1 EU/DL (ref 0.1–1)
WBC # BLD AUTO: 6.6 K/UL (ref 4.1–11.1)
WBC URNS QL MICRO: ABNORMAL /HPF (ref 0–4)

## 2022-01-16 PROCEDURE — 74011636637 HC RX REV CODE- 636/637: Performed by: PHYSICIAN ASSISTANT

## 2022-01-16 PROCEDURE — 80143 DRUG ASSAY ACETAMINOPHEN: CPT

## 2022-01-16 PROCEDURE — 80053 COMPREHEN METABOLIC PANEL: CPT

## 2022-01-16 PROCEDURE — 87636 SARSCOV2 & INF A&B AMP PRB: CPT

## 2022-01-16 PROCEDURE — 36415 COLL VENOUS BLD VENIPUNCTURE: CPT

## 2022-01-16 PROCEDURE — 99285 EMERGENCY DEPT VISIT HI MDM: CPT

## 2022-01-16 PROCEDURE — 65220000003 HC RM SEMIPRIVATE PSYCH

## 2022-01-16 PROCEDURE — 82962 GLUCOSE BLOOD TEST: CPT

## 2022-01-16 PROCEDURE — 85025 COMPLETE CBC W/AUTO DIFF WBC: CPT

## 2022-01-16 PROCEDURE — 80179 DRUG ASSAY SALICYLATE: CPT

## 2022-01-16 PROCEDURE — 81003 URINALYSIS AUTO W/O SCOPE: CPT

## 2022-01-16 RX ORDER — INSULIN LISPRO 100 [IU]/ML
INJECTION, SOLUTION INTRAVENOUS; SUBCUTANEOUS
Status: DISCONTINUED | OUTPATIENT
Start: 2022-01-16 | End: 2022-01-21 | Stop reason: HOSPADM

## 2022-01-16 RX ORDER — DEXTROSE 50 % IN WATER (D50W) INTRAVENOUS SYRINGE
25-50 AS NEEDED
Status: DISCONTINUED | OUTPATIENT
Start: 2022-01-16 | End: 2022-01-21 | Stop reason: HOSPADM

## 2022-01-16 RX ORDER — CLOPIDOGREL BISULFATE 75 MG/1
75 TABLET ORAL DAILY
Status: DISCONTINUED | OUTPATIENT
Start: 2022-01-17 | End: 2022-01-21 | Stop reason: HOSPADM

## 2022-01-16 RX ORDER — MAGNESIUM SULFATE 100 %
4 CRYSTALS MISCELLANEOUS AS NEEDED
Status: DISCONTINUED | OUTPATIENT
Start: 2022-01-16 | End: 2022-01-21 | Stop reason: HOSPADM

## 2022-01-16 RX ORDER — HYDROXYZINE 50 MG/1
50 TABLET, FILM COATED ORAL
Status: DISCONTINUED | OUTPATIENT
Start: 2022-01-16 | End: 2022-01-21 | Stop reason: HOSPADM

## 2022-01-16 RX ORDER — TRAZODONE HYDROCHLORIDE 50 MG/1
50 TABLET ORAL
Status: DISCONTINUED | OUTPATIENT
Start: 2022-01-16 | End: 2022-01-21 | Stop reason: HOSPADM

## 2022-01-16 RX ORDER — MAG HYDROX/ALUMINUM HYD/SIMETH 200-200-20
30 SUSPENSION, ORAL (FINAL DOSE FORM) ORAL
Status: DISCONTINUED | OUTPATIENT
Start: 2022-01-16 | End: 2022-01-21 | Stop reason: HOSPADM

## 2022-01-16 RX ORDER — INSULIN LISPRO 100 [IU]/ML
6 INJECTION, SOLUTION INTRAVENOUS; SUBCUTANEOUS ONCE
Status: COMPLETED | OUTPATIENT
Start: 2022-01-16 | End: 2022-01-16

## 2022-01-16 RX ORDER — HYDROCHLOROTHIAZIDE 25 MG/1
25 TABLET ORAL DAILY
Status: DISCONTINUED | OUTPATIENT
Start: 2022-01-17 | End: 2022-01-21 | Stop reason: HOSPADM

## 2022-01-16 RX ORDER — LOSARTAN POTASSIUM 50 MG/1
100 TABLET ORAL DAILY
Status: DISCONTINUED | OUTPATIENT
Start: 2022-01-17 | End: 2022-01-21 | Stop reason: HOSPADM

## 2022-01-16 RX ORDER — ATORVASTATIN CALCIUM 40 MG/1
40 TABLET, FILM COATED ORAL DAILY
Status: DISCONTINUED | OUTPATIENT
Start: 2022-01-17 | End: 2022-01-21 | Stop reason: HOSPADM

## 2022-01-16 RX ORDER — ACETAMINOPHEN 325 MG/1
650 TABLET ORAL
Status: DISCONTINUED | OUTPATIENT
Start: 2022-01-16 | End: 2022-01-21 | Stop reason: HOSPADM

## 2022-01-16 RX ORDER — GUAIFENESIN 100 MG/5ML
81 LIQUID (ML) ORAL DAILY
Status: DISCONTINUED | OUTPATIENT
Start: 2022-01-17 | End: 2022-01-21 | Stop reason: HOSPADM

## 2022-01-16 RX ORDER — HALOPERIDOL 5 MG/ML
5 INJECTION INTRAMUSCULAR
Status: DISCONTINUED | OUTPATIENT
Start: 2022-01-16 | End: 2022-01-21 | Stop reason: HOSPADM

## 2022-01-16 RX ADMIN — INSULIN LISPRO 6 UNITS: 100 INJECTION, SOLUTION INTRAVENOUS; SUBCUTANEOUS at 22:03

## 2022-01-16 NOTE — BH NOTES
NURSING ADMISSION NOTE    61 y/o AA male, admitted to Rm 237-2, to the service of [facility], w/Dx: Depression w/SI and Substance Abuse (+Cocaine). Per report, pt said he was going to stand outside, in the cold, until he freezes to death. Arrived on unit, via w/c; alert and fully oriented. Rated his depression level 8/10 and his anxiety level 5/10. Denied having thoughts to self harm; agreed to let staff know,if he does. Denied having a support system. Ling Scales he has children, in the area; however, does not have contact. Said his goal, for this hospitalization, is \"to get myself together. \" Reported he has been in [facility] tx before. Pt is homeless. T98.8 P87 R18 /89. MEDICAL HX: HTN AND DIABETES. ALLERGIES: NONE. COVID: DETECTED. INFLUENZA: NOT DETECTED. Pt reported e has been using cocaine; however, RN Perri Crandall, said a UDS was not collected, and she would collect the specimen, before the pt leaves the ED. Cooperative with personal search by [facility] Eliseo Ariza. Oriented to unit. Q 15 mins checks initiated, for safety.

## 2022-01-16 NOTE — BSMART NOTE
Pt will be admitted to [facility]/2 once the pt in that room is re-roomed.     Primary nurse, Marilu, aware    2S nurse, [facility], aware

## 2022-01-16 NOTE — ED NOTES
Patients room is locked down, he is in a green gown and his belongings are put away.  Pt is on a 1-1 sitter

## 2022-01-16 NOTE — ED NOTES
TRANSFER - OUT REPORT:    Verbal report given to Samaria MARTINEZ on Louann Conway  being transferred to  for routine progression of care       Report consisted of patients Situation, Background, Assessment and   Recommendations(SBAR). Information from the following report(s) SBAR, ED Summary and Recent Results was reviewed with the receiving nurse. Lines:       Opportunity for questions and clarification was provided.       Patient transported with:   [employer]

## 2022-01-16 NOTE — BSMART NOTE
Pt arrived at ED via pt arrived through triage and assessed in ED H3C    Pt presented with SI w/plan , substance abuse    Pt presented with disheveled appearance. Pt thought process logical    Pt cognition  appropriate decision making    Pt reports has been hospitalized once     Most Recent Hospitalizations if any: pt states several years ago    Pt reports he does not have a [address] provider    Pt does not have a hx of legal issues. Pt does not have hx of violence/aggression     Pt reports  Cocaine use    Pt UDS positive for: pending      Hx. Of Substance Treatment: NO  When: Not Applicable  Where: Not Applicable    Highest Level of Education: 12th grade    Employment: NO    Source of Income: disability    Housing: Homeless    Access to Weapons: NO    If weapons, Have they been removed: N/A    Trauma Hx:   Sexual: NO  When:  Not Applicable By Whom:Not Applicable    Physical: NO  When: Not Applicable By Whom:Not Applicable    Verbal: NO  When: Not Applicable By Whom:Not Applicable      Family Support: NO    Who: N/A      Marva Antonio PAC contacted and reports pt meets inpatient level of care and will be admitted to [address] [place] [address] pending medical clearance      This writer notified assigned RN Justice Medina and assigned physician Nico Thrasher PAC. Safety Plan Completed: N/A        PATIENT NARRATIVE SUMMARY: pt seen and assessed in ER H3C. Pt dressed in own clothing and appears stated age. Pt presents to ER for SI with plan and substance abuse concerns. Pt denies HI/AVH. Denies alcohol use. Denies other drug use. Pt states his plan is to remain outside with minimal clothing on and freeze himself to death. Pt reports heavy crack use, last use last night. Pt states he uses all day every day and he is tired and wants to die. Pt has no support system. Admits to having children, however has no contact with them. Pt states he will die if he leaves the hospital today.       This writer will follow up as needed.

## 2022-01-16 NOTE — ED PROVIDER NOTES
EMERGENCY DEPARTMENT HISTORY AND PHYSICAL EXAM      Date: 1/16/2022  Patient Name: Enrique Avalos    History of Presenting Illness     Chief Complaint   Patient presents with   [address] Problem       History Provided By: Patient    HPI: Enrique Avalos, 60 y.o. male with a past medical history significant for HTN, DM, HLD, and hepatitis who presents to the ED with cc of mental health problem. Pt presents to ED endorsing suicidal ideations with a plan. States that he is homeless and plans to stay out in the cold until he \"freezes to death\". Pt denies any auditory/visual hallucinations, HI, attempt at self-harm, or attempted OD with OTC medications, Rx medications, or illicit drugs. He does, however, admit to using \"crack\" on a regular basis, noting most recent use last night. Pt denies any hx of IVDA. He has no complaints of pain anywhere currently, and states that he is otherwise well. Pt specifically denies any chest pain, shortness of breath, fever, chills, cough, congestion, abdominal pain, nausea, vomit, diarrhea, difficulty urinating, dysuria, hematuria, headaches, light headedness, dizziness, diaphoresis, or rash. There are no other complaints, changes, or physical findings at this time. PCP: Jimmy Zamudio MD    No current facility-administered medications on file prior to encounter. Current Outpatient Medications on File Prior to Encounter   Medication Sig Dispense Refill    miscellaneous medical supply [facility] [place] with 6 inserts 2 Each 0    insulin glargine (Lantus Solostar U-100 Insulin) 100 unit/mL (3 mL) inpn 60 Units by SubCUTAneous route nightly for 270 days. Indications: type 2 diabetes mellitus 54 mL 2    Insulin Needles, Disposable, (Pen Needle) 32 gauge x 5/32\" ndle Use to inject insulin 4 times a day 200 Pen Needle 5    liraglutide (VICTOZA) 0.6 mg/0.1 mL (18 mg/3 mL) pnij 1.8 mg by SubCUTAneous route daily for 270 days.  162 mg 2    NovoLOG Flexpen U-100 Insulin 100 unit/mL (3 mL) inpn INJECT 2 UNITS PER 50u above 150mg/dl 3 times daily before meals as needed according to sliding scale. max of 36 units daily (84 day supply) 30 mL 2    clopidogreL (PLAVIX) 75 mg tab TAKE ONE TABLET BY MOUTH EVERY DAY 90 Tablet 2    losartan (COZAAR) 100 mg tablet Take 1 Tablet by mouth daily for 270 days. 90 Tablet 2    OneTouch Delica Plus Lancet 33 gauge misc use to check blood sugar FOUR TIMES DAILY 100 Lancet 4    hydroCHLOROthiazide (HYDRODIURIL) 25 mg tablet TAKE ONE TABLET BY MOUTH EVERY DAY 90 Tablet 0    insulin regular (HumuLIN R Regular U-100 Insuln) 100 unit/mL injection INJECT SUBCUTANEOUSLY THREE TIMES DAILY BEFORE MEALS per sliding scale, max 12 units per dose and 36 units per day (Patient not taking: Reported on 12/14/2021) 10 mL 3    labetaloL (NORMODYNE) 100 mg tablet TAKE ONE TABLET BY MOUTH TWICE DAILY FOR 90 DAYS 180 Tablet 2    sildenafil citrate (VIAGRA) 50 mg tablet Take 1 Tablet by mouth as needed for Erectile Dysfunction. 1 tablet 30 minutes before intercourse, no more than 1 a day. 20 Tablet 0    glucose blood VI test strips (Prodigy No Coding) strip TEST  GLUCOSE 4 times DAILY 120 Strip 11    Blood-Glucose Meter (Prodigy Autocode Meter) monitoring kit Use to check blood glucose 4 times a day 1 Kit 0    lancets misc Use one lancet QID to check blood sugar. Dx: E11.65 200 Each 5    alcohol swabs (Alcohol Pads) padm Use to check glucose 3 times a day before meals 100 Pad 11    Diabetic Shoes XX Use every day (Patient not taking: Reported on 12/14/2021) 2 Device 1    Insulin Syringe-Needle U-100 0.3 mL 30 gauge x 5/16\" syrg Use to inject novolin in sliding scale as indicated 3 times per day before meals 100 Syringe 11    lancets-blood glucose strips (Fora [id number] strp-lnct) 30 gauge cmpk Use to check glucose 3 times a day before meals 1 Dose Pack 11    lancets (Prodigy Twist Top Lancet) 28 gauge misc by Does Not Apply route.       blood pressure test kit (WRIST BLOOD PRESSURE MONITOR) by Does Not Apply route. 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    Atherosclerosis of arteries of extremities ([address] 7/15/2020    Cataract 7/15/2020    Coronary arteriosclerosis in native artery 7/15/2020    Diabetes mellitus type 2, controlled ([address] 7/15/2020    Diabetic mononeuropathy associated with type 2 diabetes mellitus ([address] 7/15/2020    Diabetic peripheral neuropathy ([address] 7/15/2020    EKG, abnormal 7/15/2020    Elevated liver enzymes 7/15/2020    Erectile dysfunction 7/15/2020    Essential hypertension 7/15/2020    Hepatitis     High cholesterol     Hyperlipidemia 7/15/2020    Hypertension     Pain in both lower legs 7/15/2020    Peripheral arterial occlusive disease ([address] 7/15/2020    Peripheral circulatory disorder associated with type 2 diabetes mellitus ([address] 7/15/2020    Spasm of back muscles 7/15/2020    Ulcer of foot ([address] 7/15/2020       Past Surgical History:  Past Surgical History:   Procedure Laterality Date    HX CATARACT REMOVAL Bilateral     HX ORTHOPAEDIC Right     stents placed    HX ORTHOPAEDIC Left     HX WISDOM TEETH EXTRACTION      IR STENT PLACEMENT      Right Leg       Family History:  Family History   Problem Relation Age of Onset    Diabetes Mother     Heart Disease Mother     Diabetes Father     Diabetes Sister     Diabetes Brother        Social History:  Social History     Tobacco Use    Smoking status: Never Smoker    Smokeless tobacco: Never Used   Vaping Use    Vaping Use: Never used   Substance Use Topics    Alcohol use: Not Currently    Drug use: Never       Allergies:  No Known Allergies      Review of Systems     Review of Systems   Constitutional: Negative. Negative for chills, diaphoresis and fever. HENT: Negative. Negative for congestion, rhinorrhea and sore throat. Eyes: Negative. Negative for pain. Respiratory: Negative. Negative for cough, chest tightness, shortness of breath and wheezing. Cardiovascular: Negative. Negative for chest pain and palpitations. Gastrointestinal: Negative. Negative for abdominal pain, diarrhea, nausea and vomiting. Genitourinary: Negative. Negative for difficulty urinating, dysuria, flank pain, frequency and hematuria. Musculoskeletal: Negative. Skin: Negative. Negative for rash. Neurological: Negative. Negative for dizziness, syncope, weakness, light-headedness, numbness and headaches. Psychiatric/Behavioral: Positive for behavioral problems and suicidal ideas. Negative for hallucinations. All other systems reviewed and are negative. Physical Exam     Physical Exam  Vitals and nursing note reviewed. Constitutional:       General: He is not in acute distress. Appearance: Normal appearance. He is not ill-appearing or toxic-appearing. Comments: overweight   HENT:      Head: Normocephalic and atraumatic. Mouth/Throat:      Mouth: Mucous membranes are moist.   Eyes:      Extraocular Movements: Extraocular movements intact. Conjunctiva/sclera: Conjunctivae normal.      Pupils: Pupils are equal, round, and reactive to light. Cardiovascular:      Rate and Rhythm: Normal rate and regular rhythm. Pulses: Normal pulses. Heart sounds: Normal heart sounds, S1 normal and S2 normal. No murmur heard. No friction rub. No gallop. Pulmonary:      Effort: Pulmonary effort is normal. No respiratory distress. Breath sounds: Normal breath sounds. No wheezing, rhonchi or rales. Abdominal:      General: Bowel sounds are normal. There is no distension. Palpations: Abdomen is soft. Tenderness: There is no abdominal tenderness. There is no guarding or rebound. Musculoskeletal:      Cervical back: Neck supple. Right lower leg: No edema. Left lower leg: No edema.    Skin:     General: Skin is warm and dry. Findings: No rash. Neurological:      General: No focal deficit present. Mental Status: He is alert and oriented to person, place, and time. Sensory: Sensation is intact. Motor: Motor function is intact. Gait: Gait is intact. Psychiatric:         Attention and Perception: Attention normal. He does not perceive auditory or visual hallucinations. Mood and Affect: Affect is flat. Behavior: Behavior is cooperative. Thought Content: Thought content includes suicidal ideation. Thought content includes suicidal plan. Lab and Diagnostic Study Results     Labs -     Recent Results (from the past 12 hour(s))   CBC WITH AUTOMATED DIFF    Collection Time: 01/16/22 10:35 AM   Result Value Ref Range    WBC 6.6 4.1 - 11.1 K/uL    RBC 4.90 4. 10 - 5.70 M/uL    HGB 14.2 12.1 - 17.0 g/dL    HCT 41.9 36.6 - 50.3 %    MCV 85.5 80.0 - 99.0 FL    MCH 29.0 26.0 - 34.0 PG    MCHC 33.9 30.0 - 36.5 g/dL    RDW 11.5 11.5 - 14.5 %    PLATELET 533 211 - 921 K/uL    MPV 11.0 8.9 - 12.9 FL    NRBC 0.0 0.0  WBC    ABSOLUTE NRBC 0.00 0.00 - 0.01 K/uL    NEUTROPHILS 66 32 - 75 %    LYMPHOCYTES 23 12 - 49 %    MONOCYTES 9 5 - 13 %    EOSINOPHILS 1 0 - 7 %    BASOPHILS 1 0 - 1 %    IMMATURE GRANULOCYTES 0 0 - 0.5 %    ABS. NEUTROPHILS 4.4 1.8 - 8.0 K/UL    ABS. LYMPHOCYTES 1.5 0.8 - 3.5 K/UL    ABS. MONOCYTES 0.6 0.0 - 1.0 K/UL    ABS. EOSINOPHILS 0.0 0.0 - 0.4 K/UL    ABS. BASOPHILS 0.0 0.0 - 0.1 K/UL    ABS. IMM.  GRANS. 0.0 0.00 - 0.04 K/UL    DF AUTOMATED     METABOLIC PANEL, COMPREHENSIVE    Collection Time: 01/16/22 10:35 AM   Result Value Ref Range    Sodium 140 136 - 145 mmol/L    Potassium 3.7 3.5 - 5.1 mmol/L    Chloride 105 97 - 108 mmol/L    CO2 32 21 - 32 mmol/L    Anion gap 3 (L) 5 - 15 mmol/L    Glucose 177 (H) 65 - 100 mg/dL    BUN 15 6 - 20 mg/dL    Creatinine 0.98 0.70 - 1.30 mg/dL    BUN/Creatinine ratio 15 12 - 20      GFR est AA >60 >60 ml/min/1.73m2    GFR est non-AA >60 >60 ml/min/1.73m2    Calcium 9.4 8.5 - 10.1 mg/dL    Bilirubin, total 0.5 0.2 - 1.0 mg/dL    AST (SGOT) 33 15 - 37 U/L    ALT (SGPT) 35 12 - 78 U/L    Alk. phosphatase 62 45 - 117 U/L    Protein, total 7.0 6.4 - 8.2 g/dL    Albumin 3.6 3.5 - 5.0 g/dL    Globulin 3.4 2.0 - 4.0 g/dL    A-G Ratio 1.1 1.1 - 2.2     SALICYLATE    Collection Time: 01/16/22 10:35 AM   Result Value Ref Range    Salicylate level <0.0 (L) 2.8 - 20.0 mg/dL   ACETAMINOPHEN    Collection Time: 01/16/22 10:35 AM   Result Value Ref Range    Acetaminophen level <10 (L) 10 - 30 ug/mL   URINALYSIS W/ RFLX MICROSCOPIC    Collection Time: 01/16/22 10:36 AM   Result Value Ref Range    Color Yellow/Straw      Appearance Clear Clear      Specific gravity 1.014 1.003 - 1.030      pH (UA) 5.0 5.0 - 8.0      Protein Negative Negative mg/dL    Glucose Negative Negative mg/dL    Ketone 5 (A) Negative mg/dL    Bilirubin Negative Negative      Blood Negative Negative      Urobilinogen 0.1 0.1 - 1.0 EU/dL    Nitrites Negative Negative      Leukocyte Esterase Negative Negative      WBC 0-4 0 - 4 /hpf    RBC 0-5 0 - 5 /hpf    Bacteria Negative Negative /hpf   COVID-19 WITH INFLUENZA A/B    Collection Time: 01/16/22 10:36 AM   Result Value Ref Range    SARS-CoV-2 DETECTED (A) Not Detected      Influenza A by PCR Not Detected Not Detected      Influenza B by PCR Not Detected Not Detected         Radiologic Studies -   @lastxrresult@  CT Results  (Last 48 hours)    None        CXR Results  (Last 48 hours)    None            Medical Decision Making   - I am the first provider for this patient. - I reviewed the vital signs, available nursing notes, past medical history, past surgical history, family history and social history. - Initial assessment performed. The patients presenting problems have been discussed, and they are in agreement with the care plan formulated and outlined with them.   I have encouraged them to ask questions as they arise throughout their visit. Vital Signs-Reviewed the patient's vital signs. Patient Vitals for the past 12 hrs:   Temp Pulse Resp BP SpO2   01/16/22 0944 97.8 °F (36.6 °C) 91 18 (!) 170/99 100 %       Records Reviewed: Nursing Notes and Old Medical Records       Provider Notes (Medical Decision Making):     MDM  Number of Diagnoses or Management Options  Cocaine abuse ([address]  Suicide ideation  Diagnosis management comments: Patient presents with acute suicidal ideation. DDx:  2/2 MDD, schizoaffective d/o, bipolar, drug induced, organic cause such as electrolyte anomoly or infection. Stable vitals and benign exam. No obvious organic causes to explain behavior but will obtain psych labs, UA, UDS and speak with mental health professional about possible admission. Pt is currently voluntary. Sitter at bedside. Will continue to monitor while in ED. Amount and/or Complexity of Data Reviewed  Clinical lab tests: ordered and reviewed  Decide to obtain previous medical records or to obtain history from someone other than the patient: yes  Obtain history from someone other than the patient: yes  Review and summarize past medical records: yes  Discuss the patient with other providers: yes       ED Course:   12:46 PM  Pt COVID positive. He is otherwise asymptomatic and continues to deny any cough, congestion, sore throat, fever, chills, body aches, or headaches. 3:53 PM  Pt evaluated by behavioral health. Pt will be admitted to our psychiatric unit pending medical clearance. Consult Note:   3:56 PM  Behavioral health specialist spoke with Josh Farrar PA-C  Psychiatric Physician Assistant  Discussed pt's hx, disposition, and available diagnostic and imaging results. Reviewed care plans. Consultant agrees with plans as outlined and will admit the patient to Dr. Cook Dry service upon medical clearance.              Procedures   Medical Decision Makingedical Decision Making  Performed by: Pierre Goldman KASEY  PROCEDURES:  Procedures       Disposition   Disposition: Admitted to [facility] at [facility] the case was discussed with the admitting physician Janine Kinney PA-C        DISCHARGE PLAN:  1. Current Discharge Medication List      CONTINUE these medications which have NOT CHANGED    Details   miscellaneous medical supply [facility] Diabetic Shoes with 6 inserts  Qty: 2 Each, Refills: 0    Associated Diagnoses: Uncontrolled type 2 diabetes mellitus with hyperglycemia (HCC)      insulin glargine (Lantus Solostar U-100 Insulin) 100 unit/mL (3 mL) inpn 60 Units by SubCUTAneous route nightly for 270 days. Indications: type 2 diabetes mellitus  Qty: 54 mL, Refills: 2    Comments: This is for information that I have increased dose to 60 units, I know he received a refill last week, no need to refill again. Thanks  Associated Diagnoses: Controlled type 2 diabetes mellitus with diabetic polyneuropathy, with long-term current use of insulin ([facility])      Insulin Needles, Disposable, (Pen Needle) 32 gauge x 5/32\" ndle Use to inject insulin 4 times a day  Qty: 200 Pen Needle, Refills: 5    Associated Diagnoses: Controlled type 2 diabetes mellitus with diabetic polyneuropathy, with long-term current use of insulin (HCC)      liraglutide (VICTOZA) 0.6 mg/0.1 mL (18 mg/3 mL) pnij 1.8 mg by SubCUTAneous route daily for 270 days. Qty: 162 mg, Refills: 2    Associated Diagnoses: Uncontrolled type 2 diabetes mellitus with hyperglycemia (HCC)      NovoLOG Flexpen U-100 Insulin 100 unit/mL (3 mL) inpn INJECT 2 UNITS PER 50u above 150mg/dl 3 times daily before meals as needed according to sliding scale.  max of 36 units daily (84 day supply)  Qty: 30 mL, Refills: 2    Associated Diagnoses: Controlled type 2 diabetes mellitus with diabetic polyneuropathy, with long-term current use of insulin (HCC)      clopidogreL (PLAVIX) 75 mg tab TAKE ONE TABLET BY MOUTH EVERY DAY  Qty: 90 Tablet, Refills: 2    Associated Diagnoses: Peripheral arterial occlusive disease (HCC)      losartan (COZAAR) 100 mg tablet Take 1 Tablet by mouth daily for 270 days. Qty: 90 Tablet, Refills: 2    Associated Diagnoses: Essential hypertension      !! OneTouch Delica Plus Lancet 33 gauge misc use to check blood sugar FOUR TIMES DAILY  Qty: 100 Lancet, Refills: 4      hydroCHLOROthiazide (HYDRODIURIL) 25 mg tablet TAKE ONE TABLET BY MOUTH EVERY DAY  Qty: 90 Tablet, Refills: 0    Associated Diagnoses: Essential hypertension      insulin regular (HumuLIN R Regular U-100 Insuln) 100 unit/mL injection INJECT SUBCUTANEOUSLY THREE TIMES DAILY BEFORE MEALS per sliding scale, max 12 units per dose and 36 units per day  Qty: 10 mL, Refills: 3    Associated Diagnoses: Controlled type 2 diabetes mellitus with diabetic polyneuropathy, with long-term current use of insulin ([facility])      labetaloL (NORMODYNE) 100 mg tablet TAKE ONE TABLET BY MOUTH TWICE DAILY FOR 90 DAYS  Qty: 180 Tablet, Refills: 2    Associated Diagnoses: Essential hypertension      sildenafil citrate (VIAGRA) 50 mg tablet Take 1 Tablet by mouth as needed for Erectile Dysfunction. 1 tablet 30 minutes before intercourse, no more than 1 a day. Qty: 20 Tablet, Refills: 0    Associated Diagnoses: Erectile dysfunction due to arterial insufficiency      glucose blood VI test strips (Prodigy No Coding) strip TEST  GLUCOSE 4 times DAILY  Qty: 120 Strip, Refills: 11    Associated Diagnoses: Controlled type 2 diabetes mellitus with diabetic polyneuropathy, with long-term current use of insulin ([facility])      Blood-Glucose Meter (Prodigy Autocode Meter) monitoring kit Use to check blood glucose 4 times a day  Qty: 1 Kit, Refills: 0    Associated Diagnoses: Controlled type 2 diabetes mellitus with diabetic polyneuropathy, with long-term current use of insulin ([facility]      ! ! lancets misc Use one lancet QID to check blood sugar.  Dx: E11.65  Qty: 200 Each, Refills: 5      alcohol swabs (Alcohol Pads) padm Use to check glucose 3 times a day before meals  Qty: 100 Pad, Refills: 11    Associated Diagnoses: Controlled type 2 diabetes mellitus with diabetic polyneuropathy, with long-term current use of insulin ([facility])      Diabetic Shoes XX Use every day  Qty: 2 Device, Refills: 1    Associated Diagnoses: Diabetic peripheral neuropathy ([facility] Acquired left foot drop; Peripheral arterial occlusive disease ([facility])      Insulin Syringe-Needle U-100 0.3 mL 30 gauge x 5/16\" syrg Use to inject novolin in sliding scale as indicated 3 times per day before meals  Qty: 100 Syringe, Refills: 11    Associated Diagnoses: Controlled type 2 diabetes mellitus with diabetic polyneuropathy, with long-term current use of insulin ([facility])      lancets-blood glucose strips (Fora [id number] strp-lnct) 30 gauge cmpk Use to check glucose 3 times a day before meals  Qty: 1 Dose Pack, Refills: 11    Associated Diagnoses: Controlled type 2 diabetes mellitus with diabetic polyneuropathy, with long-term current use of insulin ([facility]      ! ! lancets (Prodigy Twist Top Lancet) 28 gauge misc by Does Not Apply route. blood pressure test kit (WRIST BLOOD PRESSURE MONITOR) by Does Not Apply route. !! - Potential duplicate medications found. Please discuss with provider. 2.   Follow-up Information    None       3. Return to ED if worse   4. Current Discharge Medication List            Diagnosis     Clinical Impression:   1. Suicide ideation    2. Cocaine abuse ([facility]        Attestations:    Caitlin Graf PA-C    Please note that this dictation was completed with Punctil, the computer voice recognition software. Quite often unanticipated grammatical, syntax, homophones, and other interpretive errors are inadvertently transcribed by the computer software. Please disregard these errors. Please excuse any errors that have escaped final proofreading. Thank you.

## 2022-01-17 LAB
GLUCOSE BLD STRIP.AUTO-MCNC: 204 MG/DL (ref 65–117)
GLUCOSE BLD STRIP.AUTO-MCNC: 228 MG/DL (ref 65–117)
GLUCOSE BLD STRIP.AUTO-MCNC: 232 MG/DL (ref 65–117)
GLUCOSE BLD STRIP.AUTO-MCNC: 320 MG/DL (ref 65–117)
GLUCOSE BLD STRIP.AUTO-MCNC: 412 MG/DL (ref 65–117)
PERFORMED BY, TECHID: ABNORMAL

## 2022-01-17 PROCEDURE — 65220000003 HC RM SEMIPRIVATE PSYCH

## 2022-01-17 PROCEDURE — 74011636637 HC RX REV CODE- 636/637: Performed by: INTERNAL MEDICINE

## 2022-01-17 PROCEDURE — 82962 GLUCOSE BLOOD TEST: CPT

## 2022-01-17 PROCEDURE — 74011250637 HC RX REV CODE- 250/637: Performed by: PHYSICIAN ASSISTANT

## 2022-01-17 PROCEDURE — 74011636637 HC RX REV CODE- 636/637: Performed by: FAMILY MEDICINE

## 2022-01-17 RX ORDER — ESCITALOPRAM OXALATE 10 MG/1
10 TABLET ORAL DAILY
Status: DISCONTINUED | OUTPATIENT
Start: 2022-01-18 | End: 2022-01-21 | Stop reason: HOSPADM

## 2022-01-17 RX ORDER — INSULIN GLARGINE 100 [IU]/ML
60 INJECTION, SOLUTION SUBCUTANEOUS
Status: DISCONTINUED | OUTPATIENT
Start: 2022-01-17 | End: 2022-01-21 | Stop reason: HOSPADM

## 2022-01-17 RX ADMIN — ASPIRIN 81 MG CHEWABLE TABLET 81 MG: 81 TABLET CHEWABLE at 08:56

## 2022-01-17 RX ADMIN — INSULIN LISPRO 6 UNITS: 100 INJECTION, SOLUTION INTRAVENOUS; SUBCUTANEOUS at 12:55

## 2022-01-17 RX ADMIN — INSULIN GLARGINE 60 UNITS: 100 INJECTION, SOLUTION SUBCUTANEOUS at 21:28

## 2022-01-17 RX ADMIN — INSULIN LISPRO 15 UNITS: 100 INJECTION, SOLUTION INTRAVENOUS; SUBCUTANEOUS at 20:21

## 2022-01-17 RX ADMIN — ATORVASTATIN CALCIUM 40 MG: 40 TABLET, FILM COATED ORAL at 08:56

## 2022-01-17 RX ADMIN — INSULIN LISPRO 6 UNITS: 100 INJECTION, SOLUTION INTRAVENOUS; SUBCUTANEOUS at 17:16

## 2022-01-17 RX ADMIN — HYDROCHLOROTHIAZIDE 25 MG: 25 TABLET ORAL at 08:56

## 2022-01-17 RX ADMIN — CLOPIDOGREL BISULFATE 75 MG: 75 TABLET ORAL at 08:56

## 2022-01-17 RX ADMIN — INSULIN LISPRO 6 UNITS: 100 INJECTION, SOLUTION INTRAVENOUS; SUBCUTANEOUS at 08:54

## 2022-01-17 RX ADMIN — LOSARTAN POTASSIUM 100 MG: 50 TABLET, FILM COATED ORAL at 08:56

## 2022-01-17 NOTE — H&P
[facility]  PSYCH HISTORY AND PHYSICAL    Name:  Eulogio Vegas  MR#:  [id number]  :  1965  ACCOUNT #:  [de-identified]  ADMIT DATE:  2022    This is a 25-year-old   male patient who was admitted to behavioral health from [facility] ED, presented for detox from crack cocaine, last usage last night, reports suicidal thoughts. The patient says he has crack cocaine abuse 10 plus years, feeling overwhelmed, recently having suicidal thoughts with a plan. The patient denied homicidal ideation, auditory and visual hallucinations, denied alcohol abuse and cigarette abuse, denies drug abuse other than crack cocaine. He has told the  that he planned to stay outside with minimal clothing and freeze himself to death. He uses daily, every day. Currently, he is not getting any help. He says he is  but no support system, having children but no contact with them. Currently, not getting any help. He says his appetite is okay, sleep is okay, energy and motivation are okay. PAST HISTORY:  He says he was here 2-3 years ago, was at [facility], and he wants to go to outpatient substance abuse rehab. TRAUMA HISTORY:  Denied. SUBSTANCE ABUSE:  Positive for crack cocaine for several years. FAMILY HISTORY:  No mental illness, high blood pressure or heart disease. MEDICAL HISTORY:  Hypertension, type 2 diabetes mellitus, hyperlipidemia, history of CVA with left footdrop. ALLERGIES:  NO KNOWN ALLERGIES TO MEDICATIONS. Sees Dr. Fernanda Haas. 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 Has a third wife for 2 months. He says he and his siblings were eight, only he and two sisters left. His son  in a train accident in , still misses him. A niece  of poorly taking care of herself with diabetes.     MENTAL STATUS EXAM:  A tall, medium-built male patient, alert, verbal, no acute distress. No hallucinations or delusions. Had suicidal thoughts, plans to scantly dress and freeze himself to death. He says depression is now 5/10, same thing anxiety. Not suicidal anymore, feels safe and secure here. He wants to get a detox and stabilizing himself mentally and go to an outpatient substance abuse program.  Memory recall is fair. IQ about average. Insight fair. Judgment poor, by history, however, did seek hospitalization and treatment. DISPOSITION:  The patient needs inpatient level of care as he expressed suicidal thought with a plan to scantly dress and freeze himself t.  Close observation, medical consult, treat with antidepressant medications. Attend all the substance abuse groups, learn coping skills and stress management, get hooked up with outpatient substance abuse program at [address] [place] [address]. Also, has diabetes mellitus and hypertension. LABORATORIES:  CBC:  Unremarkable. Metabolic panel:  Glucose 575. Liver functions unremarkable. Salicylate level 1.7, acetaminophen level 10. Urinalysis:  Ketones 5. COVID detected. Glucose yesterday was 245. VITAL SIGNS:  Today, temperature 98.9, pulse 101, blood pressure 112/66, respirations 18, SpO2 of 100% on room air. DIAGNOSES:  Major depression, recurrent, acute, severe. Suicidal ideation without psychosis. Crack cocaine abuse. Hypertension. Diabetes mellitus, type 2. History of cerebrovascular accident, left footdrop. LENGTH OF STAY:  7-10 days. CRITERIA FOR DISCHARGE:  Free of suicidal thoughts, stable neurovegetative functioning, understanding about substance abuse, has to stay sober and make arrangements for followup for substance abuse rehabilitation, inpatient versus outpatient. Continue close observation, medical consult.     Aspirin 81 mg daily, atorvastatin 40 mg daily, Plavix 75 mg daily, hydrochlorothiazide 25 mg daily, insulin lispro subcutaneous four times daily, Tylenol p.r.n., Haldol IM every 8 hours p.r.n., hydroxyzine 50 mg three times a day p.r.n., trazodone 50 mg.      Dwight Kemp MD      [id number]  D:  01/17/2022 12:36  T:  01/17/2022 15:14  JOB #:  [id number]
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
## 2022-01-17 NOTE — PROGRESS NOTES
Spiritual Care Assessment/Progress Note  [facility]      NAME: Sujatha Conte      MRN: [id number]  AGE: 60 y.o.  SEX: male  Taoism Affiliation: No preference   Language: English     1/17/2022     Total Time (in minutes): 20     Spiritual Assessment begun in [facility] ACUTE through conversation with:         [x]Patient        [] Family    [] Friend(s)        Reason for Consult: Initial/Spiritual assessment, patient floor     Spiritual beliefs: (Please include comment if needed)     [x] Identifies with a ortiz tradition:  Faith       [] Supported by a ortiz community:            [] Claims no spiritual orientation:           [] Seeking spiritual identity:                [] Adheres to an individual form of spirituality:           [] Not able to assess:                           Identified resources for coping:      [x] Prayer                               [] Music                  [] Guided Imagery     [x] Family/friends                 [] Pet visits     [] Devotional reading                         [] Unknown     [] Other:                                              Interventions offered during this visit: (See comments for more details)    Patient Interventions: Affirmation of emotions/emotional suffering,Affirmation of ortiz,Catharsis/review of pertinent events in supportive environment,Coping skills reviewed/reinforced,Iconic (affirming the presence of God/Higher Power),Prayer (actual),Normalization of emotional/spiritual concerns           Plan of Care:     [] Support spiritual and/or cultural needs    [] Support AMD and/or advance care planning process      [] Support grieving process   [] Coordinate Rites and/or Rituals    [] Coordination with community clergy   [] No spiritual needs identified at this time   [] Detailed Plan of Care below (See Comments)  [] Make referral to Music Therapy  [] Make referral to Pet Therapy     [] Make referral to Addiction services  [] Make referral to [facility]  [] Make referral to Spiritual Care Partner  [] No future visits requested        [x] Contact Spiritual Care for further referrals     Comments:  visit for initial spiritual assessment. Patient requested  visit. Provided spiritual presence and listening as he spoke of his recent and past personal struggles. Spoke of his ortiz and ortiz journey saying he feels he has strayed form his ortiz and from [employer] and wants to take a step to resolve that in his life. Says this desire also extends to his family as he wishes to be a better person and better spouse for his wife. He spoke specifically of his desires and requested a prayer and a prayer was offered. He appeared comforted and encouraged as a result of this prayer and visit and expressed gratitude for this prayer and visit. Informed him of availability of  and pastoral care services. Rev.  Yaneth Bob MDiv, [facility], [facility]   paging service: [phone])

## 2022-01-17 NOTE — BH NOTES
Behavioral Health Treatment Team Note /COLLATERAL CONTACT CALL WITH HIEU PATHAK (SPOUSE)    Patient goal(s) for today: Participate in groups and medication management for depression    Treatment team focus/goals: Medication management, group therapy, discharge planning. Progress note: This writer contacted Kannan Moncada today at 8131. Brandt Hu reported that she wasn't aware of his crack cocaine use. The couple was recently  11/30/2021. During the month of November, her father was hospitalized for dementia. She believes that the new marriage and other stressors have become too much for him. Brandt Hu stated, \"I noticed a pattern of him going to his car to do drugs and drive away. .he would be gone for 3 days after spending all the household money and come home sad. \"  Brandt Hu stated that he can come home after discharge but she will take him to his appointments (she doesn't trust him to drive on his own). She is happy that he came to the hospital to get help and expects him to participate in treatment in the community in order to save the marriage. She wants to support him and is interested in becoming educated on how to support a loved one with substance abuse issues. She is worried about what could happen if he continues to mix drugs with medication for blood pressure and diabetes. LOS:  1  Expected LOS: 5-7 DAYS    Insurance info/prescription coverage:  [facility] Medicare HMO  Date of last family contact:  Today  Family requesting physician contact today:  no  Discharge plan: To be determined (patient would like substance use treatment)  Guns in the home:  no   Outpatient provider(s):   To be coordinated at discharge    Participating treatment team members: Kamala Moreno, * (assigned SW), NIKO Ibanez

## 2022-01-17 NOTE — BH NOTES
Pt.is up and visible on unit, affect is flat, appetite good, appearance is neat, denies feeling suicidal,mood remains depressed, hopeless, helpless, accepts medications as ordered,no signs of hyper or hypoglycemic reaction, remains on close observation.

## 2022-01-17 NOTE — PROGRESS NOTES
Problem: Depressed Mood (Adult/Pediatric)  Goal: *STG: Remains safe in hospital  Outcome: Progressing Towards Goal     Problem: Depressed Mood (Adult/Pediatric)  Goal: *STG: Complies with medication therapy  Outcome: Progressing Towards Goal     Problem: Chemical Dependency (Adult/Pediatric)  Goal: *STG: Complies with medication therapy  Outcome: Progressing Towards Goal     Patient has been safe so far this shift. Patient was medication compliant. Patient remains on close observation. Patient has been laying in bed all shift. Patient said he was [place].' He rated is depression as 6/10. He has not vioced depression, anxiety, SI or HI. Medication compliant. Accu-chek was 245. One time dose of SS Insulin was received. A call was then placed to Dr. Lexi Jones MD and patient was placed on a moderate dose SSI regimen. Continue to assess. Since going to bed, patient has been laying down quietly with his eyes closed.

## 2022-01-17 NOTE — GROUP NOTE
[facility]  GROUP DOCUMENTATION INDIVIDUAL                                                                          Group Therapy Note    Date: 1/17/2022    Group Start Time: 6615  Group End Time: 1400  Group Topic: Process Group - Inpatient    [facility] CARE MANAGEMENT    Suzette Giles BSW    [facility] GROUP DOCUMENTATION GROUP    Group Therapy Note      The writer provided the group with a worksheet that encouraged them to talk about goals they have for relationships with themselves and their environments. Attendees: 6/12         Attendance: Attended    Patient's Goal:  Attend Group     Interventions/techniques: Promoted peer support and Provide feedback    Follows Directions: Followed directions    Interactions: Interacted appropriately    Mental Status: Calm and Flat    Behavior/appearance: Attentive, Caretaking, Cooperative and Neatly groomed    Goals Achieved: Able to engage in interactions, Able to listen to others, Able to give feedback to another, Able to reflect/comment on own behavior, Able to receive feedback and Able to self-disclose      Additional Notes: The patient talked about needing to work on his downfalls and be more strong in the areas he knows he is good at. And to get in the habit of going to Spiritism and getting in shape.      TOMAS Manuel

## 2022-01-17 NOTE — BH NOTES
PSA PART II ADDITIONAL INFORMATION        Access To Fire Arms: No    Substance Use: YES    Last Use: 1/16/2022    Type of Substance:  Cocaine use    Frequency of Use: Binge use    Request to See : YES    If yes, notified : YES    Release of Information Signed: YES    Release of Information Signed For: Lucille Violet (wife)

## 2022-01-17 NOTE — GROUP NOTE
IP  GROUP DOCUMENTATION INDIVIDUAL                                                                          Group Therapy Note    Date: 1/17/2022    Group Start Time: 1115  Group End Time: 1200  Group Topic: Education Group - Inpatient    [facility] 2  NON ACUTE    Francois Luz    [facility] GROUP DOCUMENTATION GROUP    Group Therapy Note    Facilitated group session focused on introducing information on developing a thought record to help challenge automatic negative thoughts and develop a more accurate view of a situation    Attendees: 5/12         Attendance: Attended    Patient's Goal:  Attend group daily     Interventions/techniques: Informed and Supported    Follows Directions: Followed directions    Interactions: Interacted appropriately    Mental Status: Calm    Behavior/appearance: Cooperative    Goals Achieved: Able to engage in interactions, Able to listen to others and Able to self-disclose      Additional Notes:  Receptive to information discussed on developing a thought record and was able to recognize examples of different negative thoughts.  Pt was able to share a personal example when he was thinking negative about a stressful event  and was able to challenge it    Cindi Guzman, [address]

## 2022-01-18 LAB
GLUCOSE BLD STRIP.AUTO-MCNC: 187 MG/DL (ref 65–117)
GLUCOSE BLD STRIP.AUTO-MCNC: 192 MG/DL (ref 65–117)
GLUCOSE BLD STRIP.AUTO-MCNC: 238 MG/DL (ref 65–117)
GLUCOSE BLD STRIP.AUTO-MCNC: 371 MG/DL (ref 65–117)
GLUCOSE BLD STRIP.AUTO-MCNC: 444 MG/DL (ref 65–117)
PERFORMED BY, TECHID: ABNORMAL

## 2022-01-18 PROCEDURE — 65220000003 HC RM SEMIPRIVATE PSYCH

## 2022-01-18 PROCEDURE — 74011636637 HC RX REV CODE- 636/637: Performed by: FAMILY MEDICINE

## 2022-01-18 PROCEDURE — 74011250637 HC RX REV CODE- 250/637: Performed by: PHYSICIAN ASSISTANT

## 2022-01-18 PROCEDURE — 82962 GLUCOSE BLOOD TEST: CPT

## 2022-01-18 PROCEDURE — 74011636637 HC RX REV CODE- 636/637: Performed by: INTERNAL MEDICINE

## 2022-01-18 PROCEDURE — 74011250637 HC RX REV CODE- 250/637: Performed by: PSYCHIATRY & NEUROLOGY

## 2022-01-18 RX ORDER — INSULIN LISPRO 100 [IU]/ML
18 INJECTION, SOLUTION INTRAVENOUS; SUBCUTANEOUS ONCE
Status: COMPLETED | OUTPATIENT
Start: 2022-01-18 | End: 2022-01-18

## 2022-01-18 RX ADMIN — ATORVASTATIN CALCIUM 40 MG: 40 TABLET, FILM COATED ORAL at 09:10

## 2022-01-18 RX ADMIN — LOSARTAN POTASSIUM 100 MG: 50 TABLET, FILM COATED ORAL at 09:10

## 2022-01-18 RX ADMIN — INSULIN LISPRO 6 UNITS: 100 INJECTION, SOLUTION INTRAVENOUS; SUBCUTANEOUS at 11:50

## 2022-01-18 RX ADMIN — INSULIN LISPRO 3 UNITS: 100 INJECTION, SOLUTION INTRAVENOUS; SUBCUTANEOUS at 16:39

## 2022-01-18 RX ADMIN — HYDROCHLOROTHIAZIDE 25 MG: 25 TABLET ORAL at 09:10

## 2022-01-18 RX ADMIN — INSULIN LISPRO 18 UNITS: 100 INJECTION, SOLUTION INTRAVENOUS; SUBCUTANEOUS at 21:00

## 2022-01-18 RX ADMIN — ASPIRIN 81 MG CHEWABLE TABLET 81 MG: 81 TABLET CHEWABLE at 09:10

## 2022-01-18 RX ADMIN — CLOPIDOGREL BISULFATE 75 MG: 75 TABLET ORAL at 09:10

## 2022-01-18 RX ADMIN — ESCITALOPRAM OXALATE 10 MG: 10 TABLET ORAL at 09:10

## 2022-01-18 RX ADMIN — INSULIN GLARGINE 60 UNITS: 100 INJECTION, SOLUTION SUBCUTANEOUS at 20:12

## 2022-01-18 RX ADMIN — INSULIN LISPRO 3 UNITS: 100 INJECTION, SOLUTION INTRAVENOUS; SUBCUTANEOUS at 09:30

## 2022-01-18 RX ADMIN — ACETAMINOPHEN 650 MG: 325 TABLET ORAL at 20:11

## 2022-01-18 NOTE — BH NOTES
Behavioral Health Treatment Team Note     Patient goal(s) for today: Participate in groups    Treatment team focus/goals: Medication management, group therapy, discharge planning    Progress note: Patient presented as cooperative and pleasant. He reported his mood to be \"much better\" rating depression as 2/10 with no anxiety. He reported that he attended a group \"One day at a time\" and shared his goals sheet stating, \"I loved it. My goal is to get home and take care of my wife so we can be a family. \" He said he had talked to his wife and \"things are great. \" He minimizes the strain on the relationship and emphasized how he will drive to his appointments which is in contrast to what his wife expressed to this writer during collateral contact. Patient's wife stated trust is broken and to ensure he accepts the help offered, she will accompany him and drive him to all appointments. Patient reported feeling motivated to participate in therapy/treatment in the community after release. There is need for further stabilization on medication to coordinate a safe discharge. LOS:  2  Expected LOS: 5-7 days    Insurance info/prescription coverage:  [facility] [address]  Date of last family contact:  1/17/2022  Family requesting physician contact today:  no  Discharge plan:  Pending discharge to home. Guns in the home:  no   Outpatient provider(s): To be coordinated at discharge.      Participating treatment team members: Ann Hull, * (assigned SW), NIKO Read

## 2022-01-18 NOTE — GROUP NOTE
[facility]  GROUP DOCUMENTATION INDIVIDUAL                                                                          Group Therapy Note    Date: 1/18/2022    Group Start Time: 1023  Group End Time: 1400  Group Topic: Process Group - Inpatient    [facility] ACUTE    Miesha Villafuerte; Flaco Domínguez, Jonas [address] [place] [address]    Group Therapy Note    Attendees: 5/11    Writer facilitated a processing group to check in regarding how patients were feeling. Writer provided information regarding trauma and various responses. Writer provided information regarding recognizing triggers and coping skills. Writer encouraged patients to provide feedback regarding the group and encouraged pts to engage in a grounding exercise. Additional Notes:  Pt was invited and encouraged to attend group but chose not to attend.     Reji Pedroza

## 2022-01-18 NOTE — PROGRESS NOTES
Progress Note  Date:2022       Room:[facility]  Patient Narendra Crews     YOB: 1965     Age:56 y.o. Subjective    Subjective   Review of Systems  Objective         Vitals Last 24 Hours:  TEMPERATURE:  Temp  Av.4 °F (36.9 °C)  Min: 98.2 °F (36.8 °C)  Max: 98.5 °F (36.9 °C)  RESPIRATIONS RANGE: Resp  Av  Min: 18  Max: 18  PULSE OXIMETRY RANGE: No data recorded  PULSE RANGE: Pulse  Av  Min: 70  Max: 102  BLOOD PRESSURE RANGE: Systolic (91FSA), LUT:768 , Min:124 , WUM:825   ; Diastolic (85QFC), JFB:29, Min:74, Max:74    I/O (24Hr): No intake or output data in the 24 hours ending 22 1504  Objective  Labs/Imaging/Diagnostics    Labs:  CBC:Recent Labs     22  1035   WBC 6.6   RBC 4.90   HGB 14.2   HCT 41.9   MCV 85.5   RDW 11.5        CHEMISTRIES:  Recent Labs     22  1035      K 3.7      CO2 32   BUN 15   CA 9.4   PT/INR:No results for input(s): INR, INREXT in the last 72 hours. No lab exists for component: PROTIME  APTT:No results for input(s): APTT in the last 72 hours. LIVER PROFILE:  Recent Labs     22  1035   AST 33   ALT 35     Lab Results   Component Value Date/Time    ALT (SGPT) 35 2022 10:35 AM    AST (SGOT) 33 2022 10:35 AM    Alk. phosphatase 62 2022 10:35 AM    Bilirubin, total 0.5 2022 10:35 AM       Imaging Last 24 Hours:  No results found. Assessment//Plan   Principal Problem:    Suicidal ideation (2022)    Active Problems:    Hyperlipidemia (7/15/2020)      Diabetes mellitus type 2, controlled ([facility] (7/15/2020)      Overview: 3/22/21 hemoglobin A1c was 8.2      MDD (major depressive disorder) (2022)      Cocaine abuse ([facility] (2022)      Assessment & Plan discussed in the treatment team patient seen labs reviewed patient remains withdrawn and isolated sleeping would not wake up.   He is compliant with the medication and did not attend groups encouraged to attend all the groups compliant with medication vital signs are stable blood sugars are still high get better better 187 and 283 agitation or aggression not suicidal not homicidal and not psychotic just run down energy is recharging continued inpatient level of care indicated needs inpatient level of substance abuse rehab    Electronically signed by Cass Gupta MD on 1/18/2022 at 3:04 PM

## 2022-01-18 NOTE — BH NOTES
DX: DEPRESSION W/SI    Affect full/pleasant. Rated his depression and anxiety levels 6/10. Denied having thoughts to self harm. Compliant with scheduled meds. Cooperative with allowing his BS to be monitored; 8a 187 mg/dl, 12n 238 mg/dl. Consumed 100% of meals. Encouraged to attend groups. Q 15 mins checks maintained, for safety.

## 2022-01-18 NOTE — PROGRESS NOTES
Problem: Depressed Mood (Adult/Pediatric)  Goal: *STG: Attends activities and groups  Outcome: Progressing Towards Goal     Problem: Depressed Mood (Adult/Pediatric)  Goal: *STG: Remains safe in hospital  Outcome: Progressing Towards Goal     Problem: Depressed Mood (Adult/Pediatric)  Goal: *STG: Complies with medication therapy  Outcome: Progressing Towards Goal   Patient rounded on Q15\" for patient safety and security. Patient remains safe. Med compliant and is attending PSR.

## 2022-01-18 NOTE — BH NOTES
Patient up and active in the [place]au at the beginning of the shift. Interactive and talkative with staff and unit peers. Thoughts are organized. Rhesa Ser is appropriate. Denies SI,HI and AVH's. Attends PSR. Med-diet compliant. 2000-Blood sugar checked before snack time-412. Patient in no distress. Patient had  questions regarding his medications-stating that his Long-Acting Insulin had not yet been ordered. Patient stated he brought in a med list and would like to review it with the writer, the RN. Med list retrieved from the front of the chart. Lantus had not been ordered,according to the [facility] \"I take Lantus 60 Units every night and I have not gotten it since I have been here\". 2020-Dr. Gabriel called and order received to start patient's home med back up, also made aware of the 412 blood sugar. No new orders for the elevated blood sugar. MD stated to just give the SSI and check blood sugar in one hour. Order received for Lantus 60 Units Sub-Q every evening. Ordered. Patient instructed as to it ordered. Patient elated 2120-Blood sugar rechecked-320. Patient given Lantus @ this time. Patient has blood sugars ordered ac&hs,will continue to monitor.

## 2022-01-18 NOTE — PROGRESS NOTES
Consult    Subjective:     Patient is a 60y.o. year old male history of diabetes hypertension hyperlipidemia bowel vascular disease admit to psychiatric floor for detox from crack cocaine last usage last night reported suicidal thoughts came to emergency room seen by the ER physician and patient admitted to psychiatric floor for further evaluation and treatment patient denies any chest pain shortness of breath nausea vomiting diarrhea no constipation    Past Medical History:   Diagnosis Date    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    Atherosclerosis of arteries of extremities ([address] 7/15/2020    Cataract 7/15/2020    Coronary arteriosclerosis in native artery 7/15/2020    Diabetes mellitus type 2, controlled ([address] 7/15/2020    Diabetic mononeuropathy associated with type 2 diabetes mellitus ([address] 7/15/2020    Diabetic peripheral neuropathy ([address] 7/15/2020    EKG, abnormal 7/15/2020    Elevated liver enzymes 7/15/2020    Erectile dysfunction 7/15/2020    Essential hypertension 7/15/2020    Hepatitis     High cholesterol     Hyperlipidemia 7/15/2020    Hypertension     Pain in both lower legs 7/15/2020    Peripheral arterial occlusive disease ([address] 7/15/2020    Peripheral circulatory disorder associated with type 2 diabetes mellitus ([address] 7/15/2020    Spasm of back muscles 7/15/2020    Ulcer of foot ([address] 7/15/2020      Past Surgical History:   Procedure Laterality Date    HX CATARACT REMOVAL Bilateral     HX ORTHOPAEDIC Right     stents placed    HX ORTHOPAEDIC Left     HX WISDOM TEETH EXTRACTION      IR STENT PLACEMENT      Right Leg     Family History   Problem Relation Age of Onset    Diabetes Mother     Heart Disease Mother     Diabetes Father     Diabetes Sister     Diabetes Brother       Social History     Tobacco Use    Smoking status: Never Smoker    Smokeless tobacco: Never Used   Substance Use Topics    Alcohol use: Not Currently       Current Facility-Administered Medications   Medication Dose Route Frequency Provider Last Rate Last Admin    escitalopram oxalate (LEXAPRO) tablet 10 mg  10 mg Oral DAILY William Araujo MD   10 mg at 01/18/22 0910    insulin glargine (LANTUS) injection 60 Units  60 Units SubCUTAneous QHS Zabrina Gabriel MD   60 Units at 01/17/22 2128    hydrOXYzine HCL (ATARAX) tablet 50 mg  50 mg Oral TID PRN [facility] Julianne PA        acetaminophen (TYLENOL) tablet 650 mg  650 mg Oral Q4H PRN [facility] Julianne, PA        alum-mag hydroxide-simeth (MYLANTA) oral suspension 30 mL  30 mL Oral Q4H PRN [facility] Julianne, PA        haloperidol lactate (HALDOL) injection 5 mg  5 mg IntraMUSCular Q8H PRN [facility] Julianne, PA        aspirin chewable tablet 81 mg  81 mg Oral DAILY [facility], [address]   81 mg at 01/18/22 0910    clopidogreL (PLAVIX) tablet 75 mg  75 mg Oral DAILY Shelby, PA   75 mg at 01/18/22 0910    atorvastatin (LIPITOR) tablet 40 mg  40 mg Oral DAILY Shelby, PA   40 mg at 01/18/22 0910    losartan (COZAAR) tablet 100 mg  100 mg Oral DAILY Shelby, PA   100 mg at 01/18/22 0910    hydroCHLOROthiazide (HYDRODIURIL) tablet 25 mg  25 mg Oral DAILY Shelby, PA   25 mg at 01/18/22 0910    traZODone (DESYREL) tablet 50 mg  50 mg Oral QHS PRN [facility], [facility]        influenza vaccine 2021-22 (6 mos+)(PF) (FLUARIX/FLULAVAL/FLUZONE QUAD) injection 0.5 mL  1 Each IntraMUSCular PRIOR TO DISCHARGE PRATIMA Matute        insulin lispro (HUMALOG) injection   SubCUTAneous AC&HS Christopher BIRD MD   6 Units at 01/18/22 1150    glucose chewable tablet 16 g  4 Tablet Oral PRN Matthew Borrego MD        glucagon (GLUCAGEN) injection 1 mg  1 mg IntraMUSCular PRN Christopher BIRD MD        dextrose (D50W) injection syrg 12.5-25 g  25-50 mL IntraVENous PRN Christopher BIRD MD            No Known Allergies     Review of Systems:  Constitutional: Negative for chills and fever. HENT: Negative. Eyes: Negative. Respiratory: Negative. Cardiovascular: Negative. Gastrointestinal: Negative for abdominal pain and nausea. Skin: Negative. Neurological: Negative. Objective: Intake and Output:    No intake/output data recorded. No intake/output data recorded. Physical Exam:   Constitutional: pt is oriented to person, place, and time. HENT:   Head: Normocephalic and atraumatic. Eyes: Pupils are equal, round, and reactive to light. EOM are normal.   Cardiovascular: Normal rate, regular rhythm and normal heart sounds. Pulmonary/Chest: Breath sounds normal. No wheezes. No rales. Exhibits no tenderness. Abdominal: Soft. Bowel sounds are normal. There is no abdominal tenderness. There is no rebound and no guarding. Musculoskeletal: Normal range of motion. Neurological: pt is alert and oriented to person, place, and time. Alert. Normal strength. No cranial nerve deficit or sensory deficit. Displays a negative Romberg sign.       Data Review:   Recent Results (from the past 24 hour(s))   GLUCOSE, POC    Collection Time: 01/17/22  5:09 PM   Result Value Ref Range    Glucose (POC) 204 (H) 65 - 117 mg/dL    Performed by Siria Okeefe    GLUCOSE, POC    Collection Time: 01/17/22  7:46 PM   Result Value Ref Range    Glucose (POC) 412 (H) 65 - 117 mg/dL    Performed by Milton Germain    GLUCOSE, POC    Collection Time: 01/17/22  9:18 PM   Result Value Ref Range    Glucose (POC) 320 (H) 65 - 117 mg/dL    Performed by Milton Germain    GLUCOSE, POC    Collection Time: 01/18/22  8:34 AM   Result Value Ref Range    Glucose (POC) 187 (H) 65 - 117 mg/dL    Performed by Brandan Achilles    GLUCOSE, POC    Collection Time: 01/18/22 11:44 AM   Result Value Ref Range    Glucose (POC) 238 (H) 65 - 117 mg/dL    Performed by Meridian Achilles        No orders to display        Assessment:     Principal Problem:    Suicidal ideation (1/16/2022)    Active Problems:    Hyperlipidemia (7/15/2020)      Diabetes mellitus type 2, controlled ([facility] (7/15/2020)      Overview: 3/22/21 hemoglobin A1c was 8.2      MDD (major depressive disorder) (1/16/2022)      Cocaine abuse ([facility] (1/16/2022)      Type 2 diabetes hypertension hyperlipidemia peripheral vascular disease    Plan:     Continue current medication thank you for calling me    Current Facility-Administered Medications:     escitalopram oxalate (LEXAPRO) tablet 10 mg, 10 mg, Oral, DAILY, William Araujo MD, 10 mg at 01/18/22 0910    insulin glargine (LANTUS) injection 60 Units, 60 Units, SubCUTAneous, QHS, Jody Gabriel MD, 60 Units at 01/17/22 2128    hydrOXYzine HCL (ATARAX) tablet 50 mg, 50 mg, Oral, TID PRN, Bharat Patient, PA    acetaminophen (TYLENOL) tablet 650 mg, 650 mg, Oral, Q4H PRN, Bharat Patient, PA    alum-mag hydroxide-simeth (MYLANTA) oral suspension 30 mL, 30 mL, Oral, Q4H PRN, Bharat Patient, PA    haloperidol lactate (HALDOL) injection 5 mg, 5 mg, IntraMUSCular, Q8H PRN, Bobby Altman, PA    aspirin chewable tablet 81 mg, 81 mg, Oral, DAILY, Bharat Patient, PA, 81 mg at 01/18/22 0910    clopidogreL (PLAVIX) tablet 75 mg, 75 mg, Oral, DAILY, Bharat Patient, PA, 75 mg at 01/18/22 0910    atorvastatin (LIPITOR) tablet 40 mg, 40 mg, Oral, DAILY, Bharat Patient, PA, 40 mg at 01/18/22 0910    losartan (COZAAR) tablet 100 mg, 100 mg, Oral, DAILY, Bharat Patient, PA, 100 mg at 01/18/22 0910    hydroCHLOROthiazide (HYDRODIURIL) tablet 25 mg, 25 mg, Oral, DAILY, Bharat Patient, PA, 25 mg at 01/18/22 0910    traZODone (DESYREL) tablet 50 mg, 50 mg, Oral, QHS PRN, Bharat Patient, PA    influenza vaccine 2021-22 (6 mos+)(PF) (FLUARIX/FLULAVAL/FLUZONE QUAD) injection 0.5 mL, 1 Each, IntraMUSCular, PRIOR TO DISCHARGE, PRATIAM Garcias    insulin lispro (HUMALOG) injection, , SubCUTAneous, AC&HS, Sj BIRD MD, 6 Units at 01/18/22 1150    glucose chewable tablet 16 g, 4 Tablet, Oral, PRN, Wes Au MD    glucagon (GLUCAGEN) injection 1 mg, 1 mg, IntraMUSCular, PRN, Gurwinder Velásquez MD    dextrose (D50W) injection syrg 12.5-25 g, 25-50 mL, IntraVENous, PRN, Aroldo Barron MD

## 2022-01-18 NOTE — BH NOTES
Pt. Encouraged to attend group but did not attend.   Psycho-ED/Healthy relationships/breaking down our walls

## 2022-01-19 LAB
GLUCOSE BLD STRIP.AUTO-MCNC: 158 MG/DL (ref 65–117)
GLUCOSE BLD STRIP.AUTO-MCNC: 218 MG/DL (ref 65–117)
GLUCOSE BLD STRIP.AUTO-MCNC: 236 MG/DL (ref 65–117)
GLUCOSE BLD STRIP.AUTO-MCNC: 332 MG/DL (ref 65–117)
PERFORMED BY, TECHID: ABNORMAL

## 2022-01-19 PROCEDURE — 74011636637 HC RX REV CODE- 636/637: Performed by: INTERNAL MEDICINE

## 2022-01-19 PROCEDURE — 74011250637 HC RX REV CODE- 250/637: Performed by: PSYCHIATRY & NEUROLOGY

## 2022-01-19 PROCEDURE — 74011250637 HC RX REV CODE- 250/637: Performed by: PHYSICIAN ASSISTANT

## 2022-01-19 PROCEDURE — 74011636637 HC RX REV CODE- 636/637: Performed by: FAMILY MEDICINE

## 2022-01-19 PROCEDURE — 65220000003 HC RM SEMIPRIVATE PSYCH

## 2022-01-19 PROCEDURE — 82962 GLUCOSE BLOOD TEST: CPT

## 2022-01-19 RX ADMIN — LOSARTAN POTASSIUM 100 MG: 50 TABLET, FILM COATED ORAL at 08:48

## 2022-01-19 RX ADMIN — INSULIN GLARGINE 60 UNITS: 100 INJECTION, SOLUTION SUBCUTANEOUS at 20:39

## 2022-01-19 RX ADMIN — ASPIRIN 81 MG CHEWABLE TABLET 81 MG: 81 TABLET CHEWABLE at 08:48

## 2022-01-19 RX ADMIN — ESCITALOPRAM OXALATE 10 MG: 10 TABLET ORAL at 08:48

## 2022-01-19 RX ADMIN — INSULIN LISPRO 6 UNITS: 100 INJECTION, SOLUTION INTRAVENOUS; SUBCUTANEOUS at 11:18

## 2022-01-19 RX ADMIN — CLOPIDOGREL BISULFATE 75 MG: 75 TABLET ORAL at 08:48

## 2022-01-19 RX ADMIN — ATORVASTATIN CALCIUM 40 MG: 40 TABLET, FILM COATED ORAL at 08:48

## 2022-01-19 RX ADMIN — INSULIN LISPRO 3 UNITS: 100 INJECTION, SOLUTION INTRAVENOUS; SUBCUTANEOUS at 08:47

## 2022-01-19 RX ADMIN — INSULIN LISPRO 12 UNITS: 100 INJECTION, SOLUTION INTRAVENOUS; SUBCUTANEOUS at 20:40

## 2022-01-19 RX ADMIN — HYDROCHLOROTHIAZIDE 25 MG: 25 TABLET ORAL at 08:48

## 2022-01-19 RX ADMIN — INSULIN LISPRO 6 UNITS: 100 INJECTION, SOLUTION INTRAVENOUS; SUBCUTANEOUS at 16:23

## 2022-01-19 NOTE — BH NOTES
Pt denies A, D, SI/HI, AVH. Pt has been pleasant. Pt is medication compliant. Pt had a blood glucose of 444 and so the MD, Dr. Duncan Juan was called and an order of 18u give now and recheck in an hour was received. Pt blood glucose one hour later was 387. Pt is laying in bed with eyes closed.

## 2022-01-19 NOTE — BH NOTES
Behavioral Health Treatment Team Note     Patient goal(s) for today: To attend groups and find outpatient resources    Treatment team focus/goals: To continue medication management, group therapy, and discharge planning. Progress note: Patient presented with good grooming, eye contact and cooperative attitude. He denied S/I, H/I, AVH and denied any level of depression or anxiety. He said, \"The medication is working good. \" He described his mood to be, \"really good. \" His thought process is logical and insight and judgment good. He is ready to have a family session with his wife, Hung Hunt and expressed readiness for outpatient services at [facility]. An inpatient level of care is necessary to ensure further stabilization on medication to coordinate a safe discharge. **COLLATERAL CALL** This writer spoke with Hung Hunt (wife) to schedule family session on Thursday, January 20, 2022 at 1600 hrs. Possible discharge Friday. She expressed positive feelings and looks forward to the family session. LOS:  3  Expected LOS: 5-7    Insurance info/prescription coverage:  [address] [facility]  Date of last family contact:  1/19/22  Family requesting physician contact today:  no  Discharge plan:  Pending  Guns in the home:  no   Outpatient provider(s):   To be coordinated at discharge    Participating treatment team members: Rai Haq, * (assigned SW), NIKO Castillo

## 2022-01-19 NOTE — PROGRESS NOTES
visit for routine follow up. Patient asleep at the time of this visit, appears comfortable. Please contact spiritual care for further referral or consult. Rev.  Fanny Perera, Joslyn, [facility], [facility]   paging service: [phone])

## 2022-01-19 NOTE — BH NOTES
Patient visible on unit. Alert & oriented. Affect, bright. Compliant with BG monitoring. Accepts scheduled medications with no side effects reported. No physical complaints. Attends group sessions. Eats meals in the day room, consumes 100%. Interacts appropriately with staff & peers. Appearance is neat & tidy. Denies SI/HI/AVH. Compliant with wearing a mask. Encouraged to seek support from staff as needed. Will continue to monitor on close observation to ensure patient safety and follow treatment plan as written.

## 2022-01-19 NOTE — PROGRESS NOTES
Problem: Depressed Mood (Adult/Pediatric)  Goal: *STG: Remains safe in hospital  Outcome: Progressing Towards Goal     Problem: Depressed Mood (Adult/Pediatric)  Goal: *STG: Attends activities and groups  Outcome: Progressing Towards Goal     Problem: Depressed Mood (Adult/Pediatric)  Goal: *STG: Complies with medication therapy  Outcome: Progressing Towards Goal     Problem: Chemical Dependency (Adult/Pediatric)  Goal: *STG: Complies with medication therapy  Outcome: Progressing Towards Goal

## 2022-01-19 NOTE — PROGRESS NOTES
Problem: Depressed Mood (Adult/Pediatric)  Goal: *STG: Remains safe in hospital  Outcome: Progressing Towards Goal  Goal: *STG: Complies with medication therapy  Outcome: Progressing Towards Goal     Problem: Suicide  Goal: *STG: Remains safe in hospital  Outcome: Progressing Towards Goal  Goal: *STG/LTG: Complies with medication therapy  Outcome: Progressing Towards Goal

## 2022-01-19 NOTE — GROUP NOTE
[facility] GROUP DOCUMENTATION INDIVIDUAL                                                                          Group Therapy Note    Date: 1/19/2022    Group Start Time: 1115  Group End Time: 1200  Group Topic: Process Group - Inpatient    [facility] 2 [facility] ACUTE    Raynald Cabot    IP [facility] GROUP DOCUMENTATION GROUP    Group Therapy Note    Facilitators provided education on the importance of identifying triggers, coping skills, and support systems to manage crisis. Patients were encouraged to share and support one another while filling out Safety Plan worksheets. Attendees: 6/11         Attendance: Attended    Patient's Goal:  To attend groups    Interventions/techniques: Informed, Validated and Supported    Follows Directions: Followed directions    Interactions: Interacted appropriately    Mental Status: Calm and Happy    Behavior/appearance: Attentive, Cooperative, Enthusiastic and Motivated    Goals Achieved: Able to engage in interactions, Able to listen to others, Able to give feedback to another, Able to reflect/comment on own behavior, Able to manage/cope with feelings, Able to experience relief/decrease in symptoms, Able to self-disclose, Discussed coping, Discussed safety plan, Displayed empathy, Identified feelings, Identified triggers, Identified relapse prevention strategies and Identified resources and support systems      Additional Notes:  Patient demonstrated a high level of engagement.      Dori Brittle

## 2022-01-19 NOTE — GROUP NOTE
[facility]  GROUP DOCUMENTATION INDIVIDUAL                                                                          Group Therapy Note    Date: 1/19/2022    Group Start Time: 2063  Group End Time: 1400  Group Topic: Process Group - Inpatient    SRM 2 [facility] ACUTE    Sapna Anderson    IP [facility] GROUP DOCUMENTATION GROUP    Group Therapy Note:  Facilitator empowered the group to identify topics most challenging for them now. The group selected the topic of \"reason\" explaining that some of our choices are skewed. Provided psycho education on the connection between thoughts, feelings, and behaviors/choice and consequence. The topic prompted further discussion about different types of relationships, boundaries, and values. Attendees: 2/11         Attendance: Attended    Patient's Goal:  To attend groups    Interventions/techniques: Informed, Validated, Provide feedback, Role playing and Supported    Follows Directions: Followed directions    Interactions: Interacted appropriately    Mental Status: Calm and Happy    Behavior/appearance: Attentive, Cooperative, Enthusiastic and Motivated    Goals Achieved: Able to engage in interactions, Able to listen to others, Able to give feedback to another, Able to reflect/comment on own behavior, Able to manage/cope with feelings, Able to receive feedback, Able to experience relief/decrease in symptoms, Able to self-disclose, Discussed coping and Identified feelings      Additional Notes:  Patient was very respectful.     Jose Rose

## 2022-01-19 NOTE — PROGRESS NOTES
Progress Note  Date:2022       Room:[facility]  Patient Alem Perez     YOB: 1965     Age:56 y.o. Subjective    Subjective   Review of Systems  Objective         Vitals Last 24 Hours:  TEMPERATURE:  Temp  Av.1 °F (36.7 °C)  Min: 98.1 °F (36.7 °C)  Max: 98.1 °F (36.7 °C)  RESPIRATIONS RANGE: Resp  Av  Min: 16  Max: 16  PULSE OXIMETRY RANGE: No data recorded  PULSE RANGE: Pulse  Av  Min: 75  Max: 75  BLOOD PRESSURE RANGE: Systolic (88OMU), AVS:434 , Min:140 , JEL:835   ; Diastolic (34FVA), EZU:61, Min:85, Max:85    I/O (24Hr): No intake or output data in the 24 hours ending 22 1219  Objective  Labs/Imaging/Diagnostics    Labs:  CBC:No results for input(s): WBC, RBC, HGB, HCT, MCV, RDW, PLT, HGBEXT, HCTEXT, PLTEXT in the last 72 hours. CHEMISTRIES:No results for input(s): NA, K, CL, CO2, BUN, CA, PHOS, MG in the last 72 hours. No lab exists for component: CREATININE, GLUCOSEPT/INR:No results for input(s): INR, INREXT in the last 72 hours. No lab exists for component: PROTIME  APTT:No results for input(s): APTT in the last 72 hours. LIVER PROFILE:No results for input(s): AST, ALT in the last 72 hours. No lab exists for component: [place], ALKPHOS  Lab Results   Component Value Date/Time    ALT (SGPT) 35 2022 10:35 AM    AST (SGOT) 33 2022 10:35 AM    Alk. phosphatase 62 2022 10:35 AM    Bilirubin, total 0.5 2022 10:35 AM       Imaging Last 24 Hours:  No results found.   Assessment//Plan   Principal Problem:    Suicidal ideation (2022)    Active Problems:    Hyperlipidemia (7/15/2020)      Diabetes mellitus type 2, controlled ([address] (7/15/2020)      Overview: 3/22/21 hemoglobin A1c was 8.2      MDD (major depressive disorder) (2022)      Cocaine abuse ([facility] (2022)      Assessment & Plan patient case discussed with the treatment team patient seen he is out of his room alert awake attending some of the groups no withdrawal symptoms sleeping better eating better no hallucination no delusions. He says he does want to go for inpatient substance abuse rehab he was in outpatient substance abuse rehab he says his wife is also going to strongly reinforce that he need to go to the substance abuse outpatient rehab program reviewed with him he did not give a urine for drug screen when he first came in he claims he given but it was too late vital signs are stable.   Compliant with medication no side effect not suicidal not homicidal no visual or auditory hallucinations no paranoia Insight poor judgment is poor does want to go for outpatient treatment medication review no side effects and vital signs are stable no new labs resulted    Electronically signed by Cass Gupta MD on 1/19/2022 at 12:19 PM

## 2022-01-20 LAB
GLUCOSE BLD STRIP.AUTO-MCNC: 129 MG/DL (ref 65–117)
GLUCOSE BLD STRIP.AUTO-MCNC: 213 MG/DL (ref 65–117)
GLUCOSE BLD STRIP.AUTO-MCNC: 246 MG/DL (ref 65–117)
GLUCOSE BLD STRIP.AUTO-MCNC: 352 MG/DL (ref 65–117)
PERFORMED BY, TECHID: ABNORMAL

## 2022-01-20 PROCEDURE — 74011250637 HC RX REV CODE- 250/637: Performed by: PHYSICIAN ASSISTANT

## 2022-01-20 PROCEDURE — 65220000003 HC RM SEMIPRIVATE PSYCH

## 2022-01-20 PROCEDURE — 74011250637 HC RX REV CODE- 250/637: Performed by: PSYCHIATRY & NEUROLOGY

## 2022-01-20 PROCEDURE — 74011636637 HC RX REV CODE- 636/637: Performed by: INTERNAL MEDICINE

## 2022-01-20 PROCEDURE — 74011636637 HC RX REV CODE- 636/637: Performed by: FAMILY MEDICINE

## 2022-01-20 PROCEDURE — 82962 GLUCOSE BLOOD TEST: CPT

## 2022-01-20 RX ADMIN — INSULIN LISPRO 6 UNITS: 100 INJECTION, SOLUTION INTRAVENOUS; SUBCUTANEOUS at 17:10

## 2022-01-20 RX ADMIN — ATORVASTATIN CALCIUM 40 MG: 40 TABLET, FILM COATED ORAL at 08:42

## 2022-01-20 RX ADMIN — ESCITALOPRAM OXALATE 10 MG: 10 TABLET ORAL at 08:42

## 2022-01-20 RX ADMIN — CLOPIDOGREL BISULFATE 75 MG: 75 TABLET ORAL at 08:42

## 2022-01-20 RX ADMIN — HYDROCHLOROTHIAZIDE 25 MG: 25 TABLET ORAL at 08:42

## 2022-01-20 RX ADMIN — LOSARTAN POTASSIUM 100 MG: 50 TABLET, FILM COATED ORAL at 08:42

## 2022-01-20 RX ADMIN — INSULIN LISPRO 6 UNITS: 100 INJECTION, SOLUTION INTRAVENOUS; SUBCUTANEOUS at 12:31

## 2022-01-20 RX ADMIN — ASPIRIN 81 MG CHEWABLE TABLET 81 MG: 81 TABLET CHEWABLE at 08:42

## 2022-01-20 RX ADMIN — INSULIN LISPRO 15 UNITS: 100 INJECTION, SOLUTION INTRAVENOUS; SUBCUTANEOUS at 21:07

## 2022-01-20 RX ADMIN — INSULIN GLARGINE 60 UNITS: 100 INJECTION, SOLUTION SUBCUTANEOUS at 21:05

## 2022-01-20 NOTE — BH NOTES
Pt denies A, D, SI/HI, AVH. Pt has been pleasant. Pt is medication compliant. Pt HS blood glucose was 334 so pt received 12 units of Humolog along with his routine 60units of Lantus.

## 2022-01-20 NOTE — BH NOTES
Pt.is up and visible on unit, affect is flat, appetite good, appearance is neat,denies feeling suicidal,mood remains depressed, pt.is worried about not getting enough insulin coverage, states he takes insulin at home after he eats, pt.has been given diabetic teaching concerning perimeters. interacts with peers,pt.is attending groups, denies hallucinations,no other concerns voiced, remains on close observation.

## 2022-01-20 NOTE — GROUP NOTE
IP  GROUP DOCUMENTATION INDIVIDUAL                                                                          Group Therapy Note    Date: 1/19/2022    Group Start Time: 1945  Group End Time: 2030  Group Topic: Recreational/Music Therapy    SRM 2  NON ACUTE    Ti Arriaga    IP [address] [facility] GROUP DOCUMENTATION GROUP    Group Therapy Note    Attendees: 5/10  Facilitated structured group to introduce healthy leisure task skill as positive way to cope and manage stress. Attendance: Attended    Patient's Goal: STG: Attends activities and groups        Interventions/techniques: Art integration and Supported    Follows Directions: Followed directions    Interactions: Interacted appropriately    Mental Status: Calm and Flat    Behavior/appearance: Attentive    Goals Achieved: Able to engage in interactions and Able to listen to others      Additional Notes: Attended group and actively participated. Offered leisure packet. Worked on task in group and  listened to music. Was receptive to intervention and used time constructively.     Meleico Khan

## 2022-01-20 NOTE — PROGRESS NOTES
Progress Note  Date:2022       Room:[facility]  Patient Susan Thomas     YOB: 1965     Age:56 y.o. Subjective    Subjective   Review of Systems  Objective         Vitals Last 24 Hours:  TEMPERATURE:  Temp  Av °F (36.7 °C)  Min: 97.5 °F (36.4 °C)  Max: 98.4 °F (36.9 °C)  RESPIRATIONS RANGE: Resp  Av  Min: 16  Max: 18  PULSE OXIMETRY RANGE: No data recorded  PULSE RANGE: Pulse  Av.5  Min: 65  Max: 68  BLOOD PRESSURE RANGE: Systolic (98XBZ), UVK:481 , Min:143 , NWS:331   ; Diastolic (38DHS), DTO:20, Min:91, Max:95    I/O (24Hr): No intake or output data in the 24 hours ending 22 1527  Objective  Labs/Imaging/Diagnostics    Labs:  CBC:No results for input(s): WBC, RBC, HGB, HCT, MCV, RDW, PLT, HGBEXT, HCTEXT, PLTEXT in the last 72 hours. CHEMISTRIES:No results for input(s): NA, K, CL, CO2, BUN, CA, PHOS, MG in the last 72 hours. No lab exists for component: CREATININE, GLUCOSEPT/INR:No results for input(s): INR, INREXT in the last 72 hours. No lab exists for component: PROTIME  APTT:No results for input(s): APTT in the last 72 hours. LIVER PROFILE:No results for input(s): AST, ALT in the last 72 hours. No lab exists for component: [place], ALKPHOS  Lab Results   Component Value Date/Time    ALT (SGPT) 35 2022 10:35 AM    AST (SGOT) 33 2022 10:35 AM    Alk. phosphatase 62 2022 10:35 AM    Bilirubin, total 0.5 2022 10:35 AM       Imaging Last 24 Hours:  No results found.   Assessment//Plan   Principal Problem:    Suicidal ideation (2022)    Active Problems:    Hyperlipidemia (7/15/2020)      Diabetes mellitus type 2, controlled ([facility] (7/15/2020)      Overview: 3/22/21 hemoglobin A1c was 8.2      MDD (major depressive disorder) (2022)      Cocaine abuse ([facility] (2022)      Assessment & Plan patient case discussed in the treatment team this morning chart reviewed spoke with the patient and the staff patient is doing better out of his room in the day room with the peers attending the groups but motivated for getting outpatient substance abuse program wife is supportive of him going to the program and they have a meeting about her symptoms this afternoon mood better and not suicidal or homicidal not psychotic alert oriented although 3 spheres self-care grooming is good and compliant with medications.   No side effect continued inpatient level of care indicated the staff is trying to get him into his [facility] substance abuse day program vital signs are stable new labs resulted    Electronically signed by General Wendy MD on 1/20/2022 at 3:27 PM

## 2022-01-20 NOTE — GROUP NOTE
IP  GROUP DOCUMENTATION INDIVIDUAL                                                                          Group Therapy Note    Date: 1/20/2022    Group Start Time: 1520  Group End Time: 1600  Group Topic: Recreational/Music Therapy    SRM 2  NON ACUTE    Karmen Jurado    IP [facility] GROUP DOCUMENTATION GROUP    Group Therapy Note    Facilitated leisure skills group to reinforce positive coping through music, social interaction, group activities and arts/crafts    Attendees: 2/7         Attendance: Attended    Patient's Goal:  Attend group daily     Interventions/techniques: Art integration and Supported    Follows Directions: Followed directions    Interactions: Interacted appropriately    Mental Status: Calm    Behavior/appearance: Cooperative    Goals Achieved: Able to engage in interactions and Able to listen to others      Additional Notes:   Receptive to listening to music and songs he selected. Decline to work on leisure task.  Interacted with peer and staff    Jose Rodriguez

## 2022-01-20 NOTE — PROGRESS NOTES
Problem: Depressed Mood (Adult/Pediatric)  Goal: *STG: Remains safe in hospital  Outcome: Progressing Towards Goal  Goal: *STG: Complies with medication therapy  Outcome: Progressing Towards Goal     Problem: Chemical Dependency (Adult/Pediatric)  Goal: *STG: Complies with medication therapy  Outcome: Progressing Towards Goal     Problem: Gas Exchange - Impaired  Goal: Absence of hypoxia  Outcome: Progressing Towards Goal     Problem: Suicide  Goal: *STG: Remains safe in hospital  Outcome: Progressing Towards Goal  Goal: *STG/LTG: Complies with medication therapy  Outcome: Progressing Towards Goal     Problem: Falls - Risk of  Goal: *Absence of Falls  Description: Document Rylie Fall Risk and appropriate interventions in the flowsheet.   Outcome: Progressing Towards Goal  Note: Fall Risk Interventions:

## 2022-01-20 NOTE — GROUP NOTE
AUBREY  GROUP DOCUMENTATION INDIVIDUAL                                                                          Group Therapy Note    Date: 1/20/2022    Group Start Time: 1415  Group End Time: 1500  Group Topic: AA/NA    SRM 2 BEHA TH ACUTE    [place]    [facility] GROUP DOCUMENTATION GROUP    Group Therapy Note  The therapist facilitated a group on the disease of addiction, and encouraged the pt's to identify their triggers, and ways to prevent relapses from occurring. A worksheet was also provided on different relapse prevention strategies. Attendees: 2         Attendance: Attended    Patient's Goal:  To attend groups and activities     Interventions/techniques: Validated, Reinforced and Supported    Follows Directions: Followed directions    Interactions: Interacted appropriately    Mental Status: Calm and Happy    Behavior/appearance: Attentive, Cooperative and Motivated    Goals Achieved: Able to engage in interactions, Able to listen to others, Able to reflect/comment on own behavior, Able to self-disclose, Identified feelings, Identified triggers and Identified relapse prevention strategies      Additional Notes: The pt participated well in group. He spoke about how his substance use has negatively impacted his life, and how he wants to create more positive experiences and memories.      Jorge Brewer

## 2022-01-20 NOTE — BH NOTES
Behavioral Health Treatment Team Note     Patient goal(s) for today:  Participate in groups and family session today. Treatment team focus/goals: Medication management, group therapy, discharge planning. Progress note: Pt presented with good eye contact, positive/cooperative. He described his mood as, \"a little anxious\" in regards to discharge and stated, \"I hope I do alright. My wife means the world to me and I don't want to hurt her or myself ever again. \" He denied S/I, H/I and AVH. This writer validated feelings and acknowledged strengths. Pt was praised for his participation in group sessions and ability to be open and honest. This writer will conduct a family session as part of discharge planning. **FAMILY SESSION** This writer facilitated a family session with patient and his wife, Ever. Patient was able to describe what led him to the hospital, how he feels now, and what treatment will look like after discharge. Ever addressed her trust concerns and expressed feeling hurt and worried about how she is going to make sure he goes to his appointments when she has to go to work at 4:30 in the morning but said, \"We will make it work\". She asked what medication he is taking and was informed pt is prescribed Lexapro for depression and anxiety. Intake appointment at [facility] Wednesday, 1/26/22 at 0830.     LOS:  4  Expected LOS: 5-7    Insurance info/prescription coverage:  [address] [employer]  Date of last family contact:  1/19/2022  Family requesting physician contact today:  no  Discharge plan:  [facility] IOP  Guns in the home:  no   Outpatient provider(s):  [facility]    Participating treatment team members: Caio Hendricks, * (assigned SW), NIKO Coombs

## 2022-01-20 NOTE — GROUP NOTE
AUBREY  GROUP DOCUMENTATION INDIVIDUAL                                                                          Group Therapy Note    Date: 1/20/2022    Group Start Time: 2120  Group End Time: 7668  Group Topic: Education Group - Inpatient    [facility]  NON ACUTE    Shane Camilo    [facility] GROUP DOCUMENTATION GROUP    Group Therapy Note    Facilitated discussion focused on the definition and symptoms of anxiety and positive ways to manage symptoms    Attendees: 5/9         Attendance: Attended    Patient's Goal:  Attend group daily     Interventions/techniques: Informed and Supported    Follows Directions:  Followed directions    Interactions: Interacted appropriately    Mental Status: Calm    Behavior/appearance: Cooperative    Goals Achieved: Able to engage in interactions, Able to listen to others, Able to self-disclose and Discussed coping      Additional Notes:  Receptive to information discussed and was able to recognize different symptoms and shared a positive way to cope     Purcell Meigs, [address]

## 2022-01-21 VITALS
HEIGHT: 69 IN | TEMPERATURE: 98 F | RESPIRATION RATE: 16 BRPM | DIASTOLIC BLOOD PRESSURE: 82 MMHG | SYSTOLIC BLOOD PRESSURE: 124 MMHG | WEIGHT: 235 LBS | BODY MASS INDEX: 34.8 KG/M2 | HEART RATE: 66 BPM | OXYGEN SATURATION: 100 %

## 2022-01-21 LAB
GLUCOSE BLD STRIP.AUTO-MCNC: 109 MG/DL (ref 65–117)
PERFORMED BY, TECHID: NORMAL

## 2022-01-21 PROCEDURE — 74011250637 HC RX REV CODE- 250/637: Performed by: PHYSICIAN ASSISTANT

## 2022-01-21 PROCEDURE — 74011250637 HC RX REV CODE- 250/637: Performed by: PSYCHIATRY & NEUROLOGY

## 2022-01-21 PROCEDURE — 82962 GLUCOSE BLOOD TEST: CPT

## 2022-01-21 RX ORDER — HYDROCHLOROTHIAZIDE 25 MG/1
25 TABLET ORAL DAILY
Qty: 90 TABLET | Refills: 0 | Status: SHIPPED | OUTPATIENT
Start: 2022-01-21 | End: 2022-06-13 | Stop reason: SDUPTHER

## 2022-01-21 RX ORDER — PEN NEEDLE, DIABETIC 29 G X1/2"
NEEDLE, DISPOSABLE MISCELLANEOUS
Qty: 100 EACH | Refills: 0 | Status: SHIPPED | OUTPATIENT
Start: 2022-01-21

## 2022-01-21 RX ORDER — INSULIN LISPRO 100 [IU]/ML
INJECTION, SOLUTION INTRAVENOUS; SUBCUTANEOUS
Qty: 1 EACH | Refills: 0 | Status: SHIPPED
Start: 2022-01-21 | End: 2022-06-18 | Stop reason: ALTCHOICE

## 2022-01-21 RX ORDER — ATORVASTATIN CALCIUM 40 MG/1
40 TABLET, FILM COATED ORAL DAILY
Qty: 30 TABLET | Refills: 0 | Status: SHIPPED | OUTPATIENT
Start: 2022-01-22 | End: 2022-03-30

## 2022-01-21 RX ORDER — CLOPIDOGREL BISULFATE 75 MG/1
75 TABLET ORAL DAILY
Qty: 90 TABLET | Refills: 2 | Status: SHIPPED | OUTPATIENT
Start: 2022-01-21

## 2022-01-21 RX ORDER — BLOOD SUGAR DIAGNOSTIC
STRIP MISCELLANEOUS
Qty: 120 STRIP | Refills: 0 | Status: SHIPPED | OUTPATIENT
Start: 2022-01-21 | End: 2022-08-30 | Stop reason: SDUPTHER

## 2022-01-21 RX ORDER — TRAZODONE HYDROCHLORIDE 50 MG/1
50 TABLET ORAL
Qty: 30 TABLET | Refills: 0 | Status: SHIPPED | OUTPATIENT
Start: 2022-01-21

## 2022-01-21 RX ORDER — INSULIN GLARGINE 100 [IU]/ML
INJECTION, SOLUTION SUBCUTANEOUS
Qty: 1 ML | Refills: 0 | Status: SHIPPED | OUTPATIENT
Start: 2022-01-21

## 2022-01-21 RX ORDER — ISOPROPYL ALCOHOL 70 ML/100ML
SWAB TOPICAL
Qty: 100 PAD | Refills: 11 | Status: SHIPPED | OUTPATIENT
Start: 2022-01-21

## 2022-01-21 RX ORDER — LANCETS 28 GAUGE
EACH MISCELLANEOUS
Qty: 120 LANCET | Refills: 0 | Status: SHIPPED | OUTPATIENT
Start: 2022-01-21

## 2022-01-21 RX ORDER — LOSARTAN POTASSIUM 100 MG/1
100 TABLET ORAL DAILY
Qty: 30 TABLET | Refills: 0 | Status: SHIPPED | OUTPATIENT
Start: 2022-01-22

## 2022-01-21 RX ORDER — LABETALOL 100 MG/1
TABLET, FILM COATED ORAL
Qty: 180 TABLET | Refills: 2 | Status: SHIPPED | OUTPATIENT
Start: 2022-01-21

## 2022-01-21 RX ORDER — MAGNESIUM SULFATE 100 %
4 CRYSTALS MISCELLANEOUS AS NEEDED
Qty: 100 TABLET | Refills: 0 | Status: SHIPPED | OUTPATIENT
Start: 2022-01-21

## 2022-01-21 RX ORDER — GUAIFENESIN 100 MG/5ML
81 LIQUID (ML) ORAL DAILY
Qty: 30 TABLET | Refills: 0 | Status: SHIPPED | OUTPATIENT
Start: 2022-01-22

## 2022-01-21 RX ORDER — ESCITALOPRAM OXALATE 10 MG/1
10 TABLET ORAL DAILY
Qty: 30 TABLET | Refills: 0 | Status: SHIPPED | OUTPATIENT
Start: 2022-01-22

## 2022-01-21 RX ADMIN — ESCITALOPRAM OXALATE 10 MG: 10 TABLET ORAL at 08:56

## 2022-01-21 RX ADMIN — HYDROCHLOROTHIAZIDE 25 MG: 25 TABLET ORAL at 08:56

## 2022-01-21 RX ADMIN — CLOPIDOGREL BISULFATE 75 MG: 75 TABLET ORAL at 08:56

## 2022-01-21 RX ADMIN — ATORVASTATIN CALCIUM 40 MG: 40 TABLET, FILM COATED ORAL at 08:56

## 2022-01-21 RX ADMIN — ASPIRIN 81 MG CHEWABLE TABLET 81 MG: 81 TABLET CHEWABLE at 08:56

## 2022-01-21 RX ADMIN — LOSARTAN POTASSIUM 100 MG: 50 TABLET, FILM COATED ORAL at 08:56

## 2022-01-21 NOTE — BH NOTES
NURSING DISCHARGE NOTE    Discharged to home. Affect full. Pleasant. Denied feeling depressed, anxious, and/or suicidal. Rx called in to pharmacy by Dr. Britton Arora. Valuables and personal belongings returned to pt. Escorted down, by writer.

## 2022-01-21 NOTE — PROGRESS NOTES
Problem: Depressed Mood (Adult/Pediatric)  Goal: *STG: Remains safe in hospital  Outcome: Progressing Towards Goal     Problem: Depressed Mood (Adult/Pediatric)  Goal: *STG: Complies with medication therapy  Outcome: Progressing Towards Goal     Problem: Chemical Dependency (Adult/Pediatric)  Goal: *STG: Vital signs within defined limits  Outcome: Progressing Towards Goal     Patient has been safe so far this shift. Patient was medication compliant with DM regimen. Patient's VS were WNL. Patient remains on close observation. Patient has been alert, oriented, cooperative and pleasant. Patient has been up on the unit this shift. He has been socializing with select peers. He has been watching TV. Patient said he was \"pretty good. \"  He said his mood is \"good now. \"  He denied depression, anxiety, SI or HI. His accu-chek was 352 and he received 15 units of SSI and 60 units of lantus. Continue to assess. Since going to bed, patient has been laying down quietly with his eyes closed.

## 2022-01-21 NOTE — BH NOTES
DISCHARGE SUMMARY    NAME:Issa Hull  : 1965  MRN: [id number]    The patient Charolett Daughters exhibits the ability to control behavior in a less restrictive environment. Patient's level of functioning is improving. No assaultive/destructive behavior has been observed for the past 24 hours. No suicidal/homicidal threat or behavior has been observed for the past 24 hours. There is no evidence of serious medication side effects. Patient has not been in physical or protective restraints for at least the past 24 hours. If weapons involved, how are they secured? The pt denied access to any firearms. Is patient aware of and in agreement with discharge plan? yes    Arrangements for medication:  Prescriptions given to patient, given a weeks supply or 30 day supply. Copy of discharge instructions to provider?:  Yes    Arrangements for transportation home:  The pt's wife will be picking him up at noon. Keep all follow up appointments as scheduled, continue to take prescribed medications per physician instructions. Mental health crisis number:  719 or your local mental health crisis line number at [phone].       Mental Health Emergency WARM LINE      [phone] ([phone])      M-F: 9am to 9pm      Sat & Sun: 5pm - 9pm  National suicide prevention lines:                             1-672-DOINRSS (3-434-056-114-678-1443)       7-252-584-TALK ([phone])    Crisis Text Line:  Text HOME to 592155

## 2022-01-21 NOTE — GROUP NOTE
AUBREY  GROUP DOCUMENTATION INDIVIDUAL                                                                          Group Therapy Note    Date: 1/20/2022    Group Start Time: 1087  Group End Time: 1450  Group Topic: Process Group - Inpatient    SRM 2 BEHA HLTH ACUTE    Crystal Fan; Sheridan Hernandez, [address] [facility] GROUP DOCUMENTATION GROUP    Group Therapy Note    Attendees: 3/7    Process: pts were asked to pick a date in which they read a quote out from a book. Pts then discussed importance of kindness and how it impacts us. Pts were encouraged to provide positive feedback to one another and then reflect on group.          Attendance: Did not attend  Additional Notes:  Pt was encouraged to attend but chose not to do so     Jordon Woods

## 2022-01-21 NOTE — BH NOTES
Behavioral Health Transition Record to Provider    Patient Name: Radha Morton  YOB: 1965  Medical Record Number: [id number]  Date of Admission: 1/16/2022  Date of Discharge: 1/21/22    Attending Provider: Felicity Germain MD  Discharging Provider: Melinda Gay  To contact this individual call [phone] and ask the  to page. If unavailable, ask to be transferred to [address] [place] [address] Provider on call. [facility] Provider will be available on call 24/7 and during holidays. Primary Care Provider: Leighann Taylor MD    No Known Allergies    Reason for Admission: The pt was admitted for increased depression and suicidal thoughts. He also experiences substance use issues. Admission Diagnosis: Depression [F32. A]    * No surgery found *    Results for orders placed or performed during the hospital encounter of 01/16/22   COVID-19 WITH INFLUENZA A/B   Result Value Ref Range    SARS-CoV-2 DETECTED (A) Not Detected      Influenza A by PCR Not Detected Not Detected      Influenza B by PCR Not Detected Not Detected     CBC WITH AUTOMATED DIFF   Result Value Ref Range    WBC 6.6 4.1 - 11.1 K/uL    RBC 4.90 4. 10 - 5.70 M/uL    HGB 14.2 12.1 - 17.0 g/dL    HCT 41.9 36.6 - 50.3 %    MCV 85.5 80.0 - 99.0 FL    MCH 29.0 26.0 - 34.0 PG    MCHC 33.9 30.0 - 36.5 g/dL    RDW 11.5 11.5 - 14.5 %    PLATELET 116 483 - 795 K/uL    MPV 11.0 8.9 - 12.9 FL    NRBC 0.0 0.0  WBC    ABSOLUTE NRBC 0.00 0.00 - 0.01 K/uL    NEUTROPHILS 66 32 - 75 %    LYMPHOCYTES 23 12 - 49 %    MONOCYTES 9 5 - 13 %    EOSINOPHILS 1 0 - 7 %    BASOPHILS 1 0 - 1 %    IMMATURE GRANULOCYTES 0 0 - 0.5 %    ABS. NEUTROPHILS 4.4 1.8 - 8.0 K/UL    ABS. LYMPHOCYTES 1.5 0.8 - 3.5 K/UL    ABS. MONOCYTES 0.6 0.0 - 1.0 K/UL    ABS. EOSINOPHILS 0.0 0.0 - 0.4 K/UL    ABS. BASOPHILS 0.0 0.0 - 0.1 K/UL    ABS. IMM.  GRANS. 0.0 0.00 - 0.04 K/UL    DF AUTOMATED     METABOLIC PANEL, COMPREHENSIVE   Result Value Ref Range Sodium 140 136 - 145 mmol/L    Potassium 3.7 3.5 - 5.1 mmol/L    Chloride 105 97 - 108 mmol/L    CO2 32 21 - 32 mmol/L    Anion gap 3 (L) 5 - 15 mmol/L    Glucose 177 (H) 65 - 100 mg/dL    BUN 15 6 - 20 mg/dL    Creatinine 0.98 0.70 - 1.30 mg/dL    BUN/Creatinine ratio 15 12 - 20      GFR est AA >60 >60 ml/min/1.73m2    GFR est non-AA >60 >60 ml/min/1.73m2    Calcium 9.4 8.5 - 10.1 mg/dL    Bilirubin, total 0.5 0.2 - 1.0 mg/dL    AST (SGOT) 33 15 - 37 U/L    ALT (SGPT) 35 12 - 78 U/L    Alk.  phosphatase 62 45 - 117 U/L    Protein, total 7.0 6.4 - 8.2 g/dL    Albumin 3.6 3.5 - 5.0 g/dL    Globulin 3.4 2.0 - 4.0 g/dL    A-G Ratio 1.1 1.1 - 2.2     URINALYSIS W/ RFLX MICROSCOPIC   Result Value Ref Range    Color Yellow/Straw      Appearance Clear Clear      Specific gravity 1.014 1.003 - 1.030      pH (UA) 5.0 5.0 - 8.0      Protein Negative Negative mg/dL    Glucose Negative Negative mg/dL    Ketone 5 (A) Negative mg/dL    Bilirubin Negative Negative      Blood Negative Negative      Urobilinogen 0.1 0.1 - 1.0 EU/dL    Nitrites Negative Negative      Leukocyte Esterase Negative Negative      WBC 0-4 0 - 4 /hpf    RBC 0-5 0 - 5 /hpf    Bacteria Negative Negative /hpf   SALICYLATE   Result Value Ref Range    Salicylate level <5.4 (L) 2.8 - 20.0 mg/dL   ACETAMINOPHEN   Result Value Ref Range    Acetaminophen level <10 (L) 10 - 30 ug/mL   GLUCOSE, POC   Result Value Ref Range    Glucose (POC) 245 (H) 65 - 117 mg/dL    Performed by Fortunato Crystal    GLUCOSE, POC   Result Value Ref Range    Glucose (POC) 228 (H) 65 - 117 mg/dL    Performed by Lin Beat    GLUCOSE, POC   Result Value Ref Range    Glucose (POC) 232 (H) 65 - 117 mg/dL    Performed by Barbernita Beat    GLUCOSE, POC   Result Value Ref Range    Glucose (POC) 204 (H) 65 - 117 mg/dL    Performed by Lin Camarena    GLUCOSE, POC   Result Value Ref Range    Glucose (POC) 412 (H) 65 - 117 mg/dL    Performed by Tera Mack    GLUCOSE, POC   Result Value Ref Range Glucose (POC) 320 (H) 65 - 117 mg/dL    Performed by Thomas Marinelli    GLUCOSE, POC   Result Value Ref Range    Glucose (POC) 187 (H) 65 - 117 mg/dL    Performed by Renay Garcia    GLUCOSE, POC   Result Value Ref Range    Glucose (POC) 238 (H) 65 - 117 mg/dL    Performed by Renay Garcia    GLUCOSE, POC   Result Value Ref Range    Glucose (POC) 192 (H) 65 - 117 mg/dL    Performed by Renay Garcia    GLUCOSE, POC   Result Value Ref Range    Glucose (POC) 444 (H) 65 - 117 mg/dL    Performed by Allen Lott    GLUCOSE, POC   Result Value Ref Range    Glucose (POC) 371 (H) 65 - 117 mg/dL    Performed by Allen Lott    GLUCOSE, POC   Result Value Ref Range    Glucose (POC) 158 (H) 65 - 117 mg/dL    Performed by [address], POC   Result Value Ref Range    Glucose (POC) 236 (H) 65 - 117 mg/dL    Performed by [address], POC   Result Value Ref Range    Glucose (POC) 218 (H) 65 - 117 mg/dL    Performed by [address], POC   Result Value Ref Range    Glucose (POC) 332 (H) 65 - 117 mg/dL    Performed by Allen Lott    GLUCOSE, POC   Result Value Ref Range    Glucose (POC) 129 (H) 65 - 117 mg/dL    Performed by Jase Hurley    GLUCOSE, POC   Result Value Ref Range    Glucose (POC) 213 (H) 65 - 117 mg/dL    Performed by Jase Hurley    GLUCOSE, POC   Result Value Ref Range    Glucose (POC) 246 (H) 65 - 117 mg/dL    Performed by Jase Hurley    GLUCOSE, POC   Result Value Ref Range    Glucose (POC) 352 (H) 65 - 117 mg/dL    Performed by Reba Machuca, POC   Result Value Ref Range    Glucose (POC) 109 65 - 117 mg/dL    Performed by Renay Garcia        Immunizations administered during this encounter:   Immunization History   Administered Date(s) Administered    COVID-19, Pfizer Purple top, DILUTE for use, 12+ yrs, 30mcg/0.3mL dose 03/30/2021, 04/11/2021, 11/15/2021    Hep B Vaccine 06/11/2019, 07/11/2019    Hep B Vaccine (Adult) 06/11/2019    Pneumococcal Polysaccharide (PPSV-23) 09/12/2019    Zoster Recombinant 11/13/2020       Screening for Metabolic Disorders for Patients on Antipsychotic Medications  (Data obtained from the EMR)    Estimated Body Mass Index  Estimated body mass index is 34.7 kg/m² as calculated from the following:    Height as of this encounter: 5' 9\" (1.753 m). Weight as of this encounter: 106.6 kg (235 lb). Vital Signs/Blood Pressure  Visit Vitals  /82   Pulse 66   Temp 98 °F (36.7 °C)   Resp 16   Ht 5' 9\" (1.753 m)   Wt 106.6 kg (235 lb)   SpO2 100%   BMI 34.70 kg/m²       Blood Glucose/Hemoglobin A1c  Lab Results   Component Value Date/Time    Glucose 177 (H) 01/16/2022 10:35 AM    Glucose (POC) 109 01/21/2022 07:38 AM       Lab Results   Component Value Date/Time    Hemoglobin A1c, External 6.8 06/17/2020 12:00 AM        Lipid Panel  Lab Results   Component Value Date/Time    Cholesterol, total 151 03/22/2021 09:43 AM    HDL Cholesterol 34 (L) 03/22/2021 09:43 AM    LDL, calculated 87 03/22/2021 09:43 AM    Triglyceride 176 (H) 03/22/2021 09:43 AM        Discharge Diagnosis: Depression [F32. A]    Discharge Plan: The pt will be returning home with his wife. He was connected to [email or web address] for continued substance use treatment. Discharge Medication List and Instructions:   Current Discharge Medication List          Unresulted Labs (24h ago, onward)            None        To obtain results of studies pending at discharge, please contact [phone]    Follow-up Information     Follow up With Specialties Details Why [address] [place] [address]  On 1/26/2022 You have an intake appointment at 8:30am [facility]. [address], [address]  [phone]          Advanced Directive:   Does the patient have an appointed surrogate decision maker? No  Does the patient have a Medical Advance Directive? No  Does the patient have a Psychiatric Advance Directive?  No  If the patient does not have a surrogate or Medical Advance Directive AND Psychiatric Advance Directive, the patient was offered information on these advance directives Patient will complete at a later time    Patient Instructions: Please continue all medications until otherwise directed by physician. Tobacco Cessation Discharge Plan:   Is the patient a smoker and needs referral for smoking cessation? No  Patient referred to the following for smoking cessation with an appointment? No     Patient was offered medication to assist with smoking cessation at discharge? No  Was education for smoking cessation added to the discharge instructions? No    Alcohol/Substance Abuse Discharge Plan:   Does the patient have a history of substance/alcohol abuse and requires a referral for treatment? Yes  Patient referred to the following for substance/alcohol abuse treatment with an appointment? Yes  Patient was offered medication to assist with alcohol cessation at discharge? Not applicable  Was education for substance/alcohol abuse added to discharge instructions? Yes    Patient discharged to home.

## 2022-01-22 NOTE — DISCHARGE SUMMARY
[address] SUMMARY    Name:  Cassandra Mario  MR#:  [id number]  :  1965  ACCOUNT #:  [de-identified]  ADMIT DATE:  2022  DISCHARGE DATE:  2022    Please make reference to my initial psychiatric H and P. This is a 57-year-old  [facility] male patient. The patient admitted to behavioral health unit from [facility] ED.    CHIEF COMPLAINT:  Presented to ED for detox from crack cocaine, last usage night before admission, reporting suicidal thought and crack cocaine abuse for 10 plus years, feeling overwhelmed. Recently having suicidal thought with a plan. The patient denied homicidal ideation, auditory or visual hallucination. Denied alcohol abuse, cigarette abuse. Denied drug abuse other than crack cocaine abuse. His suicidal thought was he planned to stay outside with minimal clothing so that he can freeze to death. He is , but it is his third marriage. Has children, but they have no contact with the patient. Presently not getting help. The patient was admitted. Close observatio, medical consult. Labs were reviewed. Started him on aspirin 81 mg daily, atorvastatin 40 mg daily, Plavix 75 mg daily, HCTZ 25 mg daily, insulin lispro subcutaneous four times daily, Tylenol p.r.n., Haldol IM every 8 hours p.r.n., hydroxyzine 50 mg three times a day, trazodone 50 mg at bedtime. First couple of days, he was in bed withdrawn, low energy level, but then after that, he started to openly communicate, going to groups, walking in the hallway in the dayroom with peers interacting, attending all the groups. In the beginning, he was motivated with inpatient substance abuse rehab, but then he changed his mind. He only wanted one outpatient day program.  Staff in contact with his wife. She supported him, getting help. She also says she will make sure he will go to program.  Stable neurovegetative functioning. No suicidal thoughts.   Wife on board and discharged with outpatient program and discharging him to [facility].  No history of trauma. ALLERGIES:  NO ALLERGIES TO MEDICATION. He has not had any surgeries. PHYSICAL EXAMINATION:  VITAL SIGNS:  Blood pressure 124/82, pulse 66, temperature 98, respirations 16, height 5 feet 9 inches, weight 106.6 kg. SpO2 100 percent    LABORATORY DATA:  COVID detected, asymptomatic. Influenza A and B not detected. CBC unremarkable. CMP unremarkable. Urinalysis:  Ketones 5, salicylate 1.7, acetaminophen 10. Glucose, some of them around 245. Glucose 177, A1c 6.8. Lipid panel:  HDL 34, triglyceride 176. He was seen by the internist while he was here. Discharge was approved. Discharge discussed with the family. DISCHARGE DIAGNOSES:  Major depression, recurrent, acute, severe without psychosis; crack cocaine abuse; diabetes mellitus, type 2; hypertension; hyperlipidemia; history of cerebrovascular accident; left footdrop. DISCHARGE MEDICATIONS:  Losartan 100 mg daily, aspirin 81 mg daily, Lipitor 40 mg daily, insulin lispro, Humalog, and glucose 4 g chewable pills. Lexapro 10 mg daily and lancet insulin sy given. Continue labetalol 100 mg twice a day. Followup as arranged. Discharged as improved.         Adolph Guillen MD      [id number]  D:  01/21/2022 23:36  T:  01/22/2022 12:46  JOB #:  [id number]

## 2022-02-01 ENCOUNTER — TELEPHONE (OUTPATIENT)
Dept: INTERNAL MEDICINE CLINIC | Age: 57
End: 2022-02-01

## 2022-02-01 DIAGNOSIS — E11.42 DIABETIC PERIPHERAL NEUROPATHY (HCC): ICD-10-CM

## 2022-02-01 NOTE — TELEPHONE ENCOUNTER
Patient called requesting a refill for the following medication:    lidocaine (LIDODERM) 5 % 1 Each 2 10/8/2020 1/6/2021    Sig - Route: 1 Patch by TransDERmal route every twenty-four (24) hours for 90 days. Apply patch to the affected area for 12 hours a day and remove for 12 hours a day.  - TransDERmal

## 2022-02-02 RX ORDER — LIDOCAINE 50 MG/G
PATCH TOPICAL
Qty: 30 PATCH | Refills: 4 | Status: SHIPPED | OUTPATIENT
Start: 2022-02-02

## 2022-03-18 PROBLEM — N52.9 ED (ERECTILE DYSFUNCTION): Status: ACTIVE | Noted: 2020-07-15

## 2022-03-18 PROBLEM — R23.4 FISSURE IN SKIN: Status: ACTIVE | Noted: 2021-05-27

## 2022-03-18 PROBLEM — M79.662 PAIN IN BOTH LOWER LEGS: Status: ACTIVE | Noted: 2020-07-15

## 2022-03-18 PROBLEM — R45.851 SUICIDAL IDEATION: Status: ACTIVE | Noted: 2022-01-16

## 2022-03-18 PROBLEM — R74.8 ELEVATED LIVER ENZYMES: Status: ACTIVE | Noted: 2020-07-15

## 2022-03-18 PROBLEM — M79.661 PAIN IN BOTH LOWER LEGS: Status: ACTIVE | Noted: 2020-07-15

## 2022-03-18 PROBLEM — Z12.5 PROSTATE CANCER SCREENING: Status: ACTIVE | Noted: 2021-08-20

## 2022-03-18 PROBLEM — R94.31 ABNORMAL Q WAVES ON ELECTROCARDIOGRAM: Status: ACTIVE | Noted: 2020-07-15

## 2022-03-18 PROBLEM — E11.51 PERIPHERAL CIRCULATORY DISORDER ASSOCIATED WITH TYPE 2 DIABETES MELLITUS (HCC): Status: ACTIVE | Noted: 2020-07-15

## 2022-03-18 PROBLEM — R79.89 LOW TESTOSTERONE: Status: ACTIVE | Noted: 2021-08-20

## 2022-03-18 PROBLEM — E11.42 DIABETIC PERIPHERAL NEUROPATHY (HCC): Status: ACTIVE | Noted: 2020-07-15

## 2022-03-18 PROBLEM — E11.41 DIABETIC MONONEUROPATHY ASSOCIATED WITH TYPE 2 DIABETES MELLITUS (HCC): Status: ACTIVE | Noted: 2020-07-15

## 2022-03-18 PROBLEM — E11.51 PERIPHERAL CIRCULATORY DISORDER DUE TO TYPE 2 DIABETES MELLITUS (HCC): Status: ACTIVE | Noted: 2020-11-27

## 2022-03-18 PROBLEM — M72.2 PLANTAR FASCIITIS OF RIGHT FOOT: Status: ACTIVE | Noted: 2020-08-11

## 2022-03-19 PROBLEM — I25.10 CORONARY ARTERIOSCLEROSIS IN NATIVE ARTERY: Status: ACTIVE | Noted: 2020-07-15

## 2022-03-19 PROBLEM — I70.269: Status: ACTIVE | Noted: 2020-07-15

## 2022-03-19 PROBLEM — I70.209 ATHEROSCLEROSIS OF ARTERIES OF EXTREMITIES (HCC): Status: ACTIVE | Noted: 2020-07-15

## 2022-03-19 PROBLEM — I10 ESSENTIAL HYPERTENSION: Status: ACTIVE | Noted: 2020-07-15

## 2022-03-19 PROBLEM — M62.830 SPASM OF BACK MUSCLES: Status: ACTIVE | Noted: 2020-07-15

## 2022-03-19 PROBLEM — E66.01 SEVERE OBESITY (HCC): Status: ACTIVE | Noted: 2021-03-22

## 2022-03-19 PROBLEM — E78.5 HYPERLIPIDEMIA: Status: ACTIVE | Noted: 2020-07-15

## 2022-03-19 PROBLEM — F32.9 MDD (MAJOR DEPRESSIVE DISORDER): Status: ACTIVE | Noted: 2022-01-16

## 2022-03-19 PROBLEM — G58.9 MONONEUROPATHY: Status: ACTIVE | Noted: 2020-11-27

## 2022-03-19 PROBLEM — I77.9 PERIPHERAL ARTERIAL OCCLUSIVE DISEASE (HCC): Status: ACTIVE | Noted: 2020-07-15

## 2022-03-19 PROBLEM — F14.10 COCAINE ABUSE (HCC): Status: ACTIVE | Noted: 2022-01-16

## 2022-03-19 PROBLEM — B35.1 ONYCHOMYCOSIS: Status: ACTIVE | Noted: 2020-08-11

## 2022-03-19 PROBLEM — E66.01 SEVERE OBESITY (BMI 35.0-35.9 WITH COMORBIDITY) (HCC): Status: ACTIVE | Noted: 2020-07-27

## 2022-03-20 PROBLEM — M21.372 ACQUIRED LEFT FOOT DROP: Status: ACTIVE | Noted: 2021-03-22

## 2022-03-20 PROBLEM — L97.509 ULCER OF FOOT (HCC): Status: ACTIVE | Noted: 2020-07-15

## 2022-03-20 PROBLEM — E11.9 DIABETES MELLITUS TYPE 2, CONTROLLED (HCC): Status: ACTIVE | Noted: 2020-07-15

## 2022-03-20 PROBLEM — H26.9 CATARACT: Status: ACTIVE | Noted: 2020-07-15

## 2022-03-30 ENCOUNTER — TELEPHONE (OUTPATIENT)
Dept: INTERNAL MEDICINE CLINIC | Age: 57
End: 2022-03-30

## 2022-03-30 RX ORDER — ATORVASTATIN CALCIUM 40 MG/1
TABLET, FILM COATED ORAL
Qty: 90 TABLET | Refills: 2 | Status: SHIPPED | OUTPATIENT
Start: 2022-03-30 | End: 2022-06-03 | Stop reason: SDUPTHER

## 2022-03-30 NOTE — TELEPHONE ENCOUNTER
Patient called requesting a refill for the following medication:    atorvastatin (LIPITOR) 40 mg tablet 30 Tablet 0 1/22/2022     Sig - Route: Take 1 Tablet by mouth daily.  Indications: excessive fat in the blood - Oral

## 2022-05-02 ENCOUNTER — TELEPHONE (OUTPATIENT)
Dept: INTERNAL MEDICINE CLINIC | Age: 57
End: 2022-05-02

## 2022-05-02 NOTE — TELEPHONE ENCOUNTER
Dr. Jen Henry patient    Patient called requesting a refill to be sent to [facility] for his    Victoza 3-Alfredo 0.6 mg/0.1 mL (18 mg/3 mL) pnij   Sig: Inject 1.8 mg every day by subcutaneous route for 30 days.       LOV 12/14/21 [place]  NOV 6/15/22 [place]

## 2022-05-12 DIAGNOSIS — E11.42 CONTROLLED TYPE 2 DIABETES MELLITUS WITH DIABETIC POLYNEUROPATHY, WITH LONG-TERM CURRENT USE OF INSULIN (HCC): ICD-10-CM

## 2022-05-12 DIAGNOSIS — Z79.4 CONTROLLED TYPE 2 DIABETES MELLITUS WITH DIABETIC POLYNEUROPATHY, WITH LONG-TERM CURRENT USE OF INSULIN (HCC): ICD-10-CM

## 2022-05-12 RX ORDER — LIRAGLUTIDE 6 MG/ML
1.8 INJECTION SUBCUTANEOUS DAILY
Qty: 162 MG | Refills: 0 | Status: SHIPPED | OUTPATIENT
Start: 2022-05-12 | End: 2023-02-06

## 2022-06-03 ENCOUNTER — TELEPHONE (OUTPATIENT)
Dept: INTERNAL MEDICINE CLINIC | Age: 57
End: 2022-06-03

## 2022-06-03 NOTE — TELEPHONE ENCOUNTER
Pts also requesting refill for Novolog Flexpen U-100 but I do not see it in his chart, shows it was dc'd 01/21/2022, unable to view hospital records from January   LOV 12/14/2021  NOV 06/15/2022

## 2022-06-03 NOTE — TELEPHONE ENCOUNTER
Patient is requesting Hydrochlorothiazide 25 mg Qty 90   Atorvastatin 40 mg Qty 90  Novolog Flexpen U-100 Insulin     Send to [facility]

## 2022-06-06 RX ORDER — ATORVASTATIN CALCIUM 40 MG/1
40 TABLET, FILM COATED ORAL DAILY
Qty: 90 TABLET | Refills: 0 | Status: SHIPPED | OUTPATIENT
Start: 2022-06-06

## 2022-06-13 DIAGNOSIS — E11.65 UNCONTROLLED TYPE 2 DIABETES MELLITUS WITH HYPERGLYCEMIA (HCC): Primary | ICD-10-CM

## 2022-06-13 DIAGNOSIS — I10 ESSENTIAL HYPERTENSION: ICD-10-CM

## 2022-06-13 RX ORDER — INSULIN HUMAN 100 [IU]/ML
10 INJECTION, SOLUTION PARENTERAL AS NEEDED
COMMUNITY
Start: 2022-04-19 | End: 2022-06-18 | Stop reason: ALTCHOICE

## 2022-06-13 RX ORDER — INSULIN ASPART 100 [IU]/ML
50 INJECTION, SOLUTION INTRAVENOUS; SUBCUTANEOUS
COMMUNITY
Start: 2022-03-26 | End: 2022-06-13 | Stop reason: SDUPTHER

## 2022-06-18 RX ORDER — INSULIN HUMAN 100 [IU]/ML
10 INJECTION, SOLUTION PARENTERAL AS NEEDED
Qty: 1 ML | Refills: 3 | OUTPATIENT
Start: 2022-06-18

## 2022-06-18 RX ORDER — HYDROCHLOROTHIAZIDE 25 MG/1
25 TABLET ORAL DAILY
Qty: 90 TABLET | Refills: 0 | Status: SHIPPED | OUTPATIENT
Start: 2022-06-18

## 2022-06-18 RX ORDER — INSULIN ASPART 100 [IU]/ML
INJECTION, SOLUTION INTRAVENOUS; SUBCUTANEOUS
Qty: 12 ML | Refills: 3 | Status: SHIPPED | OUTPATIENT
Start: 2022-06-18

## 2022-06-22 ENCOUNTER — TELEPHONE (OUTPATIENT)
Dept: INTERNAL MEDICINE CLINIC | Age: 57
End: 2022-06-22

## 2022-06-22 DIAGNOSIS — E61.1 LOW IRON: Primary | ICD-10-CM

## 2022-06-22 NOTE — TELEPHONE ENCOUNTER
Pt came into office stating that his iron was low, he had donated plasma and was told his iron was low so he came into office requesting bloodwork and iron tablets.

## 2022-06-28 LAB
BASOPHILS # BLD AUTO: 0 X10E3/UL (ref 0–0.2)
BASOPHILS NFR BLD AUTO: 1 %
EOSINOPHIL # BLD AUTO: 0.1 X10E3/UL (ref 0–0.4)
EOSINOPHIL NFR BLD AUTO: 2 %
ERYTHROCYTE [DISTWIDTH] IN BLOOD BY AUTOMATED COUNT: 12.2 % (ref 11.6–15.4)
FERRITIN SERPL-MCNC: 53 NG/ML (ref 30–400)
HCT VFR BLD AUTO: 42 % (ref 37.5–51)
HGB BLD-MCNC: 14.3 G/DL (ref 13–17.7)
IMM GRANULOCYTES # BLD AUTO: 0 X10E3/UL (ref 0–0.1)
IMM GRANULOCYTES NFR BLD AUTO: 0 %
IRON SATN MFR SERPL: 31 % (ref 15–55)
IRON SERPL-MCNC: 91 UG/DL (ref 38–169)
LYMPHOCYTES # BLD AUTO: 1.5 X10E3/UL (ref 0.7–3.1)
LYMPHOCYTES NFR BLD AUTO: 26 %
MCH RBC QN AUTO: 29 PG (ref 26.6–33)
MCHC RBC AUTO-ENTMCNC: 34 G/DL (ref 31.5–35.7)
MCV RBC AUTO: 85 FL (ref 79–97)
MONOCYTES # BLD AUTO: 0.5 X10E3/UL (ref 0.1–0.9)
MONOCYTES NFR BLD AUTO: 9 %
NEUTROPHILS # BLD AUTO: 3.6 X10E3/UL (ref 1.4–7)
NEUTROPHILS NFR BLD AUTO: 62 %
PLATELET # BLD AUTO: 283 X10E3/UL (ref 150–450)
RBC # BLD AUTO: 4.93 X10E6/UL (ref 4.14–5.8)
TIBC SERPL-MCNC: 298 UG/DL (ref 250–450)
UIBC SERPL-MCNC: 207 UG/DL (ref 111–343)
WBC # BLD AUTO: 5.7 X10E3/UL (ref 3.4–10.8)

## 2022-07-14 ENCOUNTER — TELEPHONE (OUTPATIENT)
Dept: INTERNAL MEDICINE CLINIC | Age: 57
End: 2022-07-14

## 2022-08-30 DIAGNOSIS — E11.42 CONTROLLED TYPE 2 DIABETES MELLITUS WITH DIABETIC POLYNEUROPATHY, WITH LONG-TERM CURRENT USE OF INSULIN (HCC): ICD-10-CM

## 2022-08-30 DIAGNOSIS — Z79.4 CONTROLLED TYPE 2 DIABETES MELLITUS WITH DIABETIC POLYNEUROPATHY, WITH LONG-TERM CURRENT USE OF INSULIN (HCC): ICD-10-CM

## 2022-09-02 RX ORDER — BLOOD SUGAR DIAGNOSTIC
STRIP MISCELLANEOUS
Qty: 100 STRIP | Refills: 0 | OUTPATIENT
Start: 2022-09-02

## 2022-09-02 RX ORDER — BLOOD SUGAR DIAGNOSTIC
STRIP MISCELLANEOUS
Qty: 120 STRIP | Refills: 3 | Status: SHIPPED | OUTPATIENT
Start: 2022-09-02

## 2022-09-20 ENCOUNTER — HOSPITAL ENCOUNTER (OUTPATIENT)
Dept: GENERAL RADIOLOGY | Age: 57
Discharge: HOME | End: 2022-09-20
Payer: MEDICARE

## 2022-09-20 ENCOUNTER — TRANSCRIBE ORDER (OUTPATIENT)
Dept: REGISTRATION | Age: 57
End: 2022-09-20

## 2022-09-20 DIAGNOSIS — M25.539 WRIST PAIN: Primary | ICD-10-CM

## 2022-09-20 DIAGNOSIS — M25.539 WRIST PAIN: ICD-10-CM

## 2022-09-20 PROCEDURE — 73110 X-RAY EXAM OF WRIST: CPT

## 2022-10-18 ENCOUNTER — HOSPITAL ENCOUNTER (INPATIENT)
Age: 57
LOS: 3 days | Discharge: ANOTHER HOSPITAL | DRG: 287 | End: 2022-10-21
Attending: STUDENT IN AN ORGANIZED HEALTH CARE EDUCATION/TRAINING PROGRAM | Admitting: INTERNAL MEDICINE
Payer: MEDICARE

## 2022-10-18 ENCOUNTER — APPOINTMENT (OUTPATIENT)
Dept: GENERAL RADIOLOGY | Age: 57
DRG: 287 | End: 2022-10-18
Attending: STUDENT IN AN ORGANIZED HEALTH CARE EDUCATION/TRAINING PROGRAM
Payer: MEDICARE

## 2022-10-18 DIAGNOSIS — R07.9 CHEST PAIN, UNSPECIFIED TYPE: ICD-10-CM

## 2022-10-18 DIAGNOSIS — I20.0 UNSTABLE ANGINA (HCC): Primary | ICD-10-CM

## 2022-10-18 LAB
ALBUMIN SERPL-MCNC: 3.6 G/DL (ref 3.5–5)
ALBUMIN/GLOB SERPL: 1.1 {RATIO} (ref 1.1–2.2)
ALP SERPL-CCNC: 67 U/L (ref 45–117)
ALT SERPL-CCNC: 50 U/L (ref 12–78)
ANION GAP SERPL CALC-SCNC: 3 MMOL/L (ref 5–15)
APTT PPP: 26.8 SEC (ref 21.2–34.1)
AST SERPL W P-5'-P-CCNC: 24 U/L (ref 15–37)
BASOPHILS # BLD: 0 K/UL (ref 0–0.1)
BASOPHILS NFR BLD: 1 % (ref 0–1)
BILIRUB SERPL-MCNC: 0.3 MG/DL (ref 0.2–1)
BUN SERPL-MCNC: 14 MG/DL (ref 6–20)
BUN/CREAT SERPL: 12 (ref 12–20)
CA-I BLD-MCNC: 9.3 MG/DL (ref 8.5–10.1)
CHLORIDE SERPL-SCNC: 107 MMOL/L (ref 97–108)
CO2 SERPL-SCNC: 30 MMOL/L (ref 21–32)
CREAT SERPL-MCNC: 1.16 MG/DL (ref 0.7–1.3)
DIFFERENTIAL METHOD BLD: ABNORMAL
EOSINOPHIL # BLD: 0 K/UL (ref 0–0.4)
EOSINOPHIL NFR BLD: 0 % (ref 0–7)
ERYTHROCYTE [DISTWIDTH] IN BLOOD BY AUTOMATED COUNT: 11.6 % (ref 11.5–14.5)
GLOBULIN SER CALC-MCNC: 3.2 G/DL (ref 2–4)
GLUCOSE BLD STRIP.AUTO-MCNC: 298 MG/DL (ref 65–100)
GLUCOSE SERPL-MCNC: 299 MG/DL (ref 65–100)
HCT VFR BLD AUTO: 38.5 % (ref 36.6–50.3)
HGB BLD-MCNC: 13 G/DL (ref 12.1–17)
IMM GRANULOCYTES # BLD AUTO: 0 K/UL (ref 0–0.04)
IMM GRANULOCYTES NFR BLD AUTO: 0 % (ref 0–0.5)
INR PPP: 1 (ref 0.9–1.1)
LYMPHOCYTES # BLD: 0.8 K/UL (ref 0.8–3.5)
LYMPHOCYTES NFR BLD: 18 % (ref 12–49)
MCH RBC QN AUTO: 29.1 PG (ref 26–34)
MCHC RBC AUTO-ENTMCNC: 33.8 G/DL (ref 30–36.5)
MCV RBC AUTO: 86.1 FL (ref 80–99)
MONOCYTES # BLD: 0.2 K/UL (ref 0–1)
MONOCYTES NFR BLD: 4 % (ref 5–13)
NEUTS SEG # BLD: 3.4 K/UL (ref 1.8–8)
NEUTS SEG NFR BLD: 77 % (ref 32–75)
NRBC # BLD: 0 K/UL (ref 0–0.01)
NRBC BLD-RTO: 0 PER 100 WBC
PERFORMED BY, TECHID: ABNORMAL
PLATELET # BLD AUTO: 311 K/UL (ref 150–400)
PMV BLD AUTO: 11.2 FL (ref 8.9–12.9)
POTASSIUM SERPL-SCNC: 4.1 MMOL/L (ref 3.5–5.1)
PROT SERPL-MCNC: 6.8 G/DL (ref 6.4–8.2)
PROTHROMBIN TIME: 13.1 SEC (ref 11.9–14.6)
RBC # BLD AUTO: 4.47 M/UL (ref 4.1–5.7)
SODIUM SERPL-SCNC: 140 MMOL/L (ref 136–145)
THERAPEUTIC RANGE,PTTT: NORMAL SEC (ref 82–109)
TROPONIN-HIGH SENSITIVITY: 41 NG/L (ref 0–76)
WBC # BLD AUTO: 4.3 K/UL (ref 4.1–11.1)

## 2022-10-18 PROCEDURE — 74011000250 HC RX REV CODE- 250: Performed by: INTERNAL MEDICINE

## 2022-10-18 PROCEDURE — 74011636637 HC RX REV CODE- 636/637: Performed by: INTERNAL MEDICINE

## 2022-10-18 PROCEDURE — 85730 THROMBOPLASTIN TIME PARTIAL: CPT

## 2022-10-18 PROCEDURE — 96374 THER/PROPH/DIAG INJ IV PUSH: CPT

## 2022-10-18 PROCEDURE — 85025 COMPLETE CBC W/AUTO DIFF WBC: CPT

## 2022-10-18 PROCEDURE — 82962 GLUCOSE BLOOD TEST: CPT

## 2022-10-18 PROCEDURE — 93005 ELECTROCARDIOGRAM TRACING: CPT

## 2022-10-18 PROCEDURE — 84484 ASSAY OF TROPONIN QUANT: CPT

## 2022-10-18 PROCEDURE — 96375 TX/PRO/DX INJ NEW DRUG ADDON: CPT

## 2022-10-18 PROCEDURE — 99285 EMERGENCY DEPT VISIT HI MDM: CPT

## 2022-10-18 PROCEDURE — 71045 X-RAY EXAM CHEST 1 VIEW: CPT

## 2022-10-18 PROCEDURE — 74011250637 HC RX REV CODE- 250/637: Performed by: STUDENT IN AN ORGANIZED HEALTH CARE EDUCATION/TRAINING PROGRAM

## 2022-10-18 PROCEDURE — 80053 COMPREHEN METABOLIC PANEL: CPT

## 2022-10-18 PROCEDURE — 36415 COLL VENOUS BLD VENIPUNCTURE: CPT

## 2022-10-18 PROCEDURE — 74011250637 HC RX REV CODE- 250/637: Performed by: INTERNAL MEDICINE

## 2022-10-18 PROCEDURE — 65270000032 HC RM SEMIPRIVATE

## 2022-10-18 PROCEDURE — 85610 PROTHROMBIN TIME: CPT

## 2022-10-18 PROCEDURE — 74011250636 HC RX REV CODE- 250/636: Performed by: STUDENT IN AN ORGANIZED HEALTH CARE EDUCATION/TRAINING PROGRAM

## 2022-10-18 RX ORDER — NITROGLYCERIN 0.4 MG/1
0.4 TABLET SUBLINGUAL AS NEEDED
Status: DISCONTINUED | OUTPATIENT
Start: 2022-10-18 | End: 2022-10-21 | Stop reason: HOSPADM

## 2022-10-18 RX ORDER — ONDANSETRON 2 MG/ML
4 INJECTION INTRAMUSCULAR; INTRAVENOUS
Status: DISCONTINUED | OUTPATIENT
Start: 2022-10-18 | End: 2022-10-21 | Stop reason: HOSPADM

## 2022-10-18 RX ORDER — ATORVASTATIN CALCIUM 40 MG/1
40 TABLET, FILM COATED ORAL DAILY
Status: DISCONTINUED | OUTPATIENT
Start: 2022-10-19 | End: 2022-10-21 | Stop reason: HOSPADM

## 2022-10-18 RX ORDER — GUAIFENESIN 100 MG/5ML
81 LIQUID (ML) ORAL DAILY
Status: DISCONTINUED | OUTPATIENT
Start: 2022-10-19 | End: 2022-10-21 | Stop reason: HOSPADM

## 2022-10-18 RX ORDER — HEPARIN SODIUM 10000 [USP'U]/100ML
12-25 INJECTION, SOLUTION INTRAVENOUS
Status: DISCONTINUED | OUTPATIENT
Start: 2022-10-18 | End: 2022-10-21 | Stop reason: HOSPADM

## 2022-10-18 RX ORDER — MORPHINE SULFATE 2 MG/ML
2 INJECTION, SOLUTION INTRAMUSCULAR; INTRAVENOUS ONCE
Status: COMPLETED | OUTPATIENT
Start: 2022-10-18 | End: 2022-10-18

## 2022-10-18 RX ORDER — LOSARTAN POTASSIUM 50 MG/1
100 TABLET ORAL DAILY
Status: DISCONTINUED | OUTPATIENT
Start: 2022-10-19 | End: 2022-10-21 | Stop reason: HOSPADM

## 2022-10-18 RX ORDER — SODIUM CHLORIDE 0.9 % (FLUSH) 0.9 %
5-40 SYRINGE (ML) INJECTION EVERY 8 HOURS
Status: DISCONTINUED | OUTPATIENT
Start: 2022-10-18 | End: 2022-10-21 | Stop reason: HOSPADM

## 2022-10-18 RX ORDER — HEPARIN SODIUM 1000 [USP'U]/ML
4000 INJECTION, SOLUTION INTRAVENOUS; SUBCUTANEOUS AS NEEDED
Status: DISCONTINUED | OUTPATIENT
Start: 2022-10-18 | End: 2022-10-21 | Stop reason: HOSPADM

## 2022-10-18 RX ORDER — ONDANSETRON 4 MG/1
4 TABLET, ORALLY DISINTEGRATING ORAL
Status: DISCONTINUED | OUTPATIENT
Start: 2022-10-18 | End: 2022-10-21 | Stop reason: HOSPADM

## 2022-10-18 RX ORDER — INSULIN LISPRO 100 [IU]/ML
INJECTION, SOLUTION INTRAVENOUS; SUBCUTANEOUS
Status: DISCONTINUED | OUTPATIENT
Start: 2022-10-19 | End: 2022-10-19

## 2022-10-18 RX ORDER — INSULIN GLARGINE 100 [IU]/ML
50 INJECTION, SOLUTION SUBCUTANEOUS
Status: DISCONTINUED | OUTPATIENT
Start: 2022-10-18 | End: 2022-10-20

## 2022-10-18 RX ORDER — LABETALOL 100 MG/1
100 TABLET, FILM COATED ORAL 2 TIMES DAILY
Status: DISCONTINUED | OUTPATIENT
Start: 2022-10-18 | End: 2022-10-20

## 2022-10-18 RX ORDER — CLOPIDOGREL BISULFATE 75 MG/1
75 TABLET ORAL DAILY
Status: DISCONTINUED | OUTPATIENT
Start: 2022-10-19 | End: 2022-10-19

## 2022-10-18 RX ORDER — HYDROCHLOROTHIAZIDE 25 MG/1
25 TABLET ORAL DAILY
Status: DISCONTINUED | OUTPATIENT
Start: 2022-10-19 | End: 2022-10-18

## 2022-10-18 RX ORDER — MAGNESIUM SULFATE 100 %
4 CRYSTALS MISCELLANEOUS AS NEEDED
Status: DISCONTINUED | OUTPATIENT
Start: 2022-10-18 | End: 2022-10-21 | Stop reason: HOSPADM

## 2022-10-18 RX ORDER — POLYETHYLENE GLYCOL 3350 17 G/17G
17 POWDER, FOR SOLUTION ORAL DAILY PRN
Status: DISCONTINUED | OUTPATIENT
Start: 2022-10-18 | End: 2022-10-21 | Stop reason: HOSPADM

## 2022-10-18 RX ORDER — HEPARIN SODIUM 1000 [USP'U]/ML
2000 INJECTION, SOLUTION INTRAVENOUS; SUBCUTANEOUS AS NEEDED
Status: DISCONTINUED | OUTPATIENT
Start: 2022-10-18 | End: 2022-10-21 | Stop reason: HOSPADM

## 2022-10-18 RX ORDER — ACETAMINOPHEN 650 MG/1
650 SUPPOSITORY RECTAL
Status: DISCONTINUED | OUTPATIENT
Start: 2022-10-18 | End: 2022-10-19

## 2022-10-18 RX ORDER — HEPARIN SODIUM 1000 [USP'U]/ML
4000 INJECTION, SOLUTION INTRAVENOUS; SUBCUTANEOUS ONCE
Status: COMPLETED | OUTPATIENT
Start: 2022-10-18 | End: 2022-10-18

## 2022-10-18 RX ORDER — DEXTROSE MONOHYDRATE 100 MG/ML
0-250 INJECTION, SOLUTION INTRAVENOUS AS NEEDED
Status: DISCONTINUED | OUTPATIENT
Start: 2022-10-18 | End: 2022-10-21 | Stop reason: HOSPADM

## 2022-10-18 RX ORDER — ESCITALOPRAM OXALATE 10 MG/1
10 TABLET ORAL DAILY
Status: DISCONTINUED | OUTPATIENT
Start: 2022-10-19 | End: 2022-10-21 | Stop reason: HOSPADM

## 2022-10-18 RX ORDER — SODIUM CHLORIDE 0.9 % (FLUSH) 0.9 %
5-40 SYRINGE (ML) INJECTION AS NEEDED
Status: DISCONTINUED | OUTPATIENT
Start: 2022-10-18 | End: 2022-10-21 | Stop reason: HOSPADM

## 2022-10-18 RX ORDER — ASPIRIN 325 MG
325 TABLET ORAL
Status: COMPLETED | OUTPATIENT
Start: 2022-10-18 | End: 2022-10-18

## 2022-10-18 RX ORDER — ACETAMINOPHEN 325 MG/1
650 TABLET ORAL
Status: DISCONTINUED | OUTPATIENT
Start: 2022-10-18 | End: 2022-10-21 | Stop reason: HOSPADM

## 2022-10-18 RX ADMIN — LABETALOL HYDROCHLORIDE 100 MG: 100 TABLET, FILM COATED ORAL at 23:24

## 2022-10-18 RX ADMIN — HEPARIN SODIUM AND DEXTROSE 12 UNITS/KG/HR: 10000; 5 INJECTION INTRAVENOUS at 19:05

## 2022-10-18 RX ADMIN — SODIUM CHLORIDE, PRESERVATIVE FREE 10 ML: 5 INJECTION INTRAVENOUS at 22:58

## 2022-10-18 RX ADMIN — MORPHINE SULFATE 2 MG: 2 INJECTION, SOLUTION INTRAMUSCULAR; INTRAVENOUS at 19:00

## 2022-10-18 RX ADMIN — ASPIRIN 325 MG ORAL TABLET 325 MG: 325 PILL ORAL at 18:42

## 2022-10-18 RX ADMIN — SODIUM CHLORIDE 1000 ML: 9 INJECTION, SOLUTION INTRAVENOUS at 18:43

## 2022-10-18 RX ADMIN — INSULIN GLARGINE 50 UNITS: 100 INJECTION, SOLUTION SUBCUTANEOUS at 23:24

## 2022-10-18 RX ADMIN — HEPARIN SODIUM 4000 UNITS: 1000 INJECTION, SOLUTION INTRAVENOUS; SUBCUTANEOUS at 19:02

## 2022-10-18 NOTE — ED TRIAGE NOTES
Patient was at cardiologist today and had a stress test done. Was put on a heart monitor at the office. Having chest pain since then and has became worse.  Denies any SOB or N/V

## 2022-10-18 NOTE — ED PROVIDER NOTES
EMERGENCY DEPARTMENT HISTORY AND PHYSICAL EXAM      Date: 10/18/2022  Patient Name: Caio Hendricks    History of Presenting Illness   No chief complaint on file. History Provided By: Patient    HPI: Caio Hendricks, 60 y.o. male with history of prior MI presenting to the ED for left-sided chest pain. Patient reports over the last 2 weeks the patient has been having exertional chest pain. He was following with his cardiologist today over at [facility] where he was receiving a stress test and began to have chest pain. He was sent over to the ED for further evaluation  There are no other complaints, changes, or physical findings at this time.     PCP: Sam Joaquin MD    Current Facility-Administered Medications   Medication Dose Route Frequency Provider Last Rate Last Admin    heparin 25,000 units in D5W 250 ml infusion  12-25 Units/kg/hr IntraVENous TITRATE Iban Salmon MD 13.1 mL/hr at 10/18/22 1905 12 Units/kg/hr at 10/18/22 1905    heparin (porcine) 1,000 unit/mL injection 4,000 Units  4,000 Units IntraVENous PRN Iban Salmon MD        Or    heparin (porcine) 1,000 unit/mL injection 2,000 Units  2,000 Units IntraVENous PRN Iban Salmon MD        aspirin chewable tablet 81 mg  81 mg Oral DAILY Iban Salmon MD        atorvastatin (LIPITOR) tablet 40 mg  40 mg Oral DAILY Iban Salmon MD        clopidogreL (PLAVIX) tablet 75 mg  75 mg Oral DAILY Iban Salmon MD        escitalopram oxalate (LEXAPRO) tablet 10 mg  10 mg Oral DAILY Iban Salmon MD        insulin glargine (LANTUS) injection 50 Units  50 Units SubCUTAneous QHS Iban Salmon MD   50 Units at 10/18/22 2324    insulin lispro (HUMALOG) injection   SubCUTAneous Polo Forte MD        glucose chewable tablet 16 g  4 Tablet Oral PRN Iban Salmon MD        glucagon (GLUCAGEN) injection 1 mg  1 mg IntraMUSCular PRN Iban Salmon MD        dextrose 10% infusion 0-250 mL  0-250 mL IntraVENous PRN Iban Salmon MD labetaloL (NORMODYNE) tablet 100 mg  100 mg Oral BID uDran Griggs MD   100 mg at 10/18/22 2324    losartan (COZAAR) tablet 100 mg  100 mg Oral DAILY Prabhu Simon MD        sodium chloride (NS) flush 5-40 mL  5-40 mL IntraVENous Antoinette Ascencio MD   10 mL at 10/18/22 2258    sodium chloride (NS) flush 5-40 mL  5-40 mL IntraVENous PRN Duran Griggs MD        acetaminophen (TYLENOL) tablet 650 mg  650 mg Oral Q6H PRN Duran Griggs MD        Or    acetaminophen (TYLENOL) suppository 650 mg  650 mg Rectal Q6H PRN Duran Griggs MD        polyethylene glycol (MIRALAX) packet 17 g  17 g Oral DAILY PRN Duran Griggs MD        ondansetron (ZOFRAN ODT) tablet 4 mg  4 mg Oral Q8H PRN Duran Griggs MD        Or    ondansetron (ZOFRAN) injection 4 mg  4 mg IntraVENous Q6H PRN Duran Griggs MD        nitroglycerin (NITROSTAT) tablet 0.4 mg  0.4 mg SubLINGual PRN Duran Griggs MD           Past History   Past Medical History:  Past Medical History:   Diagnosis Date    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    Atherosclerosis of arteries of extremities ([address] 7/15/2020    Cataract 7/15/2020    Coronary arteriosclerosis in native artery 7/15/2020    Diabetes mellitus type 2, controlled ([address] 7/15/2020    Diabetic mononeuropathy associated with type 2 diabetes mellitus ([address] 7/15/2020    Diabetic peripheral neuropathy ([address] 7/15/2020    EKG, abnormal 7/15/2020    Elevated liver enzymes 7/15/2020    Erectile dysfunction 7/15/2020    Essential hypertension 7/15/2020    Hepatitis     High cholesterol     Hyperlipidemia 7/15/2020    Hypertension     Pain in both lower legs 7/15/2020    Peripheral arterial occlusive disease ([address] 7/15/2020    Peripheral circulatory disorder associated with type 2 diabetes mellitus ([address] 7/15/2020    Spasm of back muscles 7/15/2020    Ulcer of foot ([address] 7/15/2020       Past Surgical History:  Past Surgical History:   Procedure Laterality Date    HX CATARACT REMOVAL Bilateral     HX ORTHOPAEDIC Right     stents placed    HX ORTHOPAEDIC Left     HX WISDOM TEETH EXTRACTION      IR STENT PLACEMENT      Right Leg       Family History:  Family History   Problem Relation Age of Onset    Diabetes Mother     Heart Disease Mother     Diabetes Father     Diabetes Sister     Diabetes Brother        Social History:  Social History     Tobacco Use    Smoking status: Never    Smokeless tobacco: Never   Vaping Use    Vaping Use: Never used   Substance Use Topics    Alcohol use: Not Currently    Drug use: Never       Allergies:  No Known Allergies  Review of Systems   Review of Systems   Constitutional:  Negative for activity change. HENT:  Negative for drooling. Eyes:  Negative for redness. Respiratory:  Negative for shortness of breath. Cardiovascular:  Positive for chest pain. Gastrointestinal:  Negative for abdominal pain. Musculoskeletal:  Negative for back pain and neck pain. Skin:  Negative for color change. Neurological:  Negative for weakness and headaches. Psychiatric/Behavioral:  Negative for agitation. All other systems reviewed and are negative. Physical Exam   Physical Exam  Vitals and nursing note reviewed. Constitutional:       General: He is not in acute distress. Appearance: Normal appearance. HENT:      Head: Normocephalic. Nose: Nose normal.      Mouth/Throat:      Mouth: Mucous membranes are moist.   Eyes:      Conjunctiva/sclera: Conjunctivae normal.   Cardiovascular:      Rate and Rhythm: Normal rate. Pulses: Normal pulses. Pulmonary:      Effort: Pulmonary effort is normal.      Breath sounds: Normal breath sounds. Abdominal:      General: Abdomen is flat. Musculoskeletal:         General: No deformity. Cervical back: Neck supple. Skin:     General: Skin is warm. Neurological:      General: No focal deficit present. Mental Status: He is alert.    Psychiatric:         Mood and Affect: Mood normal.     Lab and Diagnostic Study Results   Labs -     Recent Results (from the past 12 hour(s))   TROPONIN-HIGH SENSITIVITY    Collection Time: 10/18/22  6:40 PM   Result Value Ref Range    Troponin-High Sensitivity 41 0 - 76 ng/L   CBC WITH AUTOMATED DIFF    Collection Time: 10/18/22  6:44 PM   Result Value Ref Range    WBC 4.3 4.1 - 11.1 K/uL    RBC 4.47 4.10 - 5.70 M/uL    HGB 13.0 12.1 - 17.0 g/dL    HCT 38.5 36.6 - 50.3 %    MCV 86.1 80.0 - 99.0 FL    MCH 29.1 26.0 - 34.0 PG    MCHC 33.8 30.0 - 36.5 g/dL    RDW 11.6 11.5 - 14.5 %    PLATELET 472 330 - 463 K/uL    MPV 11.2 8.9 - 12.9 FL    NRBC 0.0 0.0  WBC    ABSOLUTE NRBC 0.00 0.00 - 0.01 K/uL    NEUTROPHILS 77 (H) 32 - 75 %    LYMPHOCYTES 18 12 - 49 %    MONOCYTES 4 (L) 5 - 13 %    EOSINOPHILS 0 0 - 7 %    BASOPHILS 1 0 - 1 %    IMMATURE GRANULOCYTES 0 0 - 0.5 %    ABS. NEUTROPHILS 3.4 1.8 - 8.0 K/UL    ABS. LYMPHOCYTES 0.8 0.8 - 3.5 K/UL    ABS. MONOCYTES 0.2 0.0 - 1.0 K/UL    ABS. EOSINOPHILS 0.0 0.0 - 0.4 K/UL    ABS. BASOPHILS 0.0 0.0 - 0.1 K/UL    ABS. IMM. GRANS. 0.0 0.00 - 0.04 K/UL    DF AUTOMATED     METABOLIC PANEL, COMPREHENSIVE    Collection Time: 10/18/22  6:44 PM   Result Value Ref Range    Sodium 140 136 - 145 mmol/L    Potassium 4.1 3.5 - 5.1 mmol/L    Chloride 107 97 - 108 mmol/L    CO2 30 21 - 32 mmol/L    Anion gap 3 (L) 5 - 15 mmol/L    Glucose 299 (H) 65 - 100 mg/dL    BUN 14 6 - 20 mg/dL    Creatinine 1.16 0.70 - 1.30 mg/dL    BUN/Creatinine ratio 12 12 - 20      eGFR >60 >60 ml/min/1.73m2    Calcium 9.3 8.5 - 10.1 mg/dL    Bilirubin, total 0.3 0.2 - 1.0 mg/dL    AST (SGOT) 24 15 - 37 U/L    ALT (SGPT) 50 12 - 78 U/L    Alk.  phosphatase 67 45 - 117 U/L    Protein, total 6.8 6.4 - 8.2 g/dL    Albumin 3.6 3.5 - 5.0 g/dL    Globulin 3.2 2.0 - 4.0 g/dL    A-G Ratio 1.1 1.1 - 2.2     PROTHROMBIN TIME + INR    Collection Time: 10/18/22  6:44 PM   Result Value Ref Range    Prothrombin time 13.1 11.9 - 14.6 sec    INR 1.0 0.9 - 1.1     PTT    Collection Time: 10/18/22 6:44 PM   Result Value Ref Range    aPTT 26.8 21.2 - 34.1 sec    aPTT, therapeutic range   82 - 109 sec   GLUCOSE, POC    Collection Time: 10/18/22 10:56 PM   Result Value Ref Range    Glucose (POC) 298 (H) 65 - 100 mg/dL    Performed by Izzy Carlton        Radiologic Studies -   [unfilled]  CT Results  (Last 48 hours)      None          CXR Results  (Last 48 hours)                 10/18/22 1854  XR CHEST PORT Final result    Impression:      No acute process on portable chest.           Narrative:  EXAM:  XR CHEST PORT       INDICATION: Chest pain       COMPARISON: 9/8/2014       TECHNIQUE: Upright portable chest AP view       FINDINGS: Cardiac monitor leads The cardiac silhouette is within normal limits. The pulmonary vasculature is within normal limits. The lungs and pleural spaces are clear. The visualized bones and upper abdomen   are age-appropriate. Medical Decision Making and ED Course   Differential Diagnosis & Medical Decision Making Provider Note:   73-year-old male with prior cardiac history presenting for chest pain. On arrival EKG concerning for inferior STEMI with lateral depressions.    - I am the first and primary provider for this patient. I reviewed the vital signs, available nursing notes, past medical history, past surgical history, family history and social history. The patient's presenting problems have been discussed, and the staff are in agreement with the care plan formulated and outlined with them. I have encouraged them to ask questions as they arise throughout their visit. Vital Signs-Reviewed the patient's vital signs.   Patient Vitals for the past 12 hrs:   Temp Pulse Resp BP SpO2   10/19/22 0000 -- 85 -- -- --   10/18/22 2233 97.8 °F (36.6 °C) 80 24 127/72 94 %   10/18/22 1844 -- 71 24 -- 99 %   10/18/22 1840 -- -- -- -- 98 %   10/18/22 1839 -- 73 15 -- --   10/18/22 1817 97.8 °F (36.6 °C) 77 16 (!) 143/86 97 %       ED Course:   ED Course as of 10/19/22 0109   Tue Oct 18, 2022   1855 On arrival, SANTY noted in III and aVF with inversions in and depressions in I and AVL. I called STEMI given his chest pain. I discussed with cardiologists on call Dr. Zachariah Knapp who does not believe this to be stemi, stemi cancelled - will continue to monitor and likely admit given his unstable angina. [PC]      ED Course User Index  [PC] Bonnie Abdul MD   Patient was provided aspirin and started on a heparin drip. He was not provided Brilinta as he is on clopidogrel    Labs noted, troponin of 41. I discussed the case with the hospitalist who will accept admit for chest pain work-up. Procedures and Critical Care     Performed by: Elayne Newman MD  Procedures      Disposition   Disposition: Admitted to Floor Medical Floor the case was discussed with the admitting physician     Diagnosis/Clinical Impression     Clinical Impression:   1. Unstable angina [facility])        Attestations: IElayne MD, am the primary clinician of record. Please note that this dictation was completed with [email or web address], the computer voice recognition software. Quite often unanticipated grammatical, syntax, homophones, and other interpretive errors are inadvertently transcribed by the computer software. Please disregard these errors. Please excuse any errors that have escaped final proofreading. Thank you.

## 2022-10-18 NOTE — Clinical Note
TRANSFER - OUT REPORT:     Verbal report given to: Yun, (at bedside). Report consisted of patient's Situation, Background, Assessment and   Recommendations(SBAR). Opportunity for questions and clarification was provided. Patient transported with a Registered Nurse.

## 2022-10-18 NOTE — Clinical Note
Diagnostic catheter inserted over the wire. Meloxicam was written as capsules, and Pharmacy said it only comes in tablets, please resend to [facility] on [facility]

## 2022-10-18 NOTE — Clinical Note
Right radial artery. Accessed successfully. Micropuncture needle used.  Number of attempts =  1. present

## 2022-10-19 LAB
ANION GAP SERPL CALC-SCNC: 7 MMOL/L (ref 5–15)
APTT PPP: 108.1 SEC (ref 21.2–34.1)
APTT PPP: 61.3 SEC (ref 21.2–34.1)
APTT PPP: 61.8 SEC (ref 21.2–34.1)
ATRIAL RATE: 70 BPM
BUN SERPL-MCNC: 14 MG/DL (ref 6–20)
BUN/CREAT SERPL: 13 (ref 12–20)
CA-I BLD-MCNC: 9.2 MG/DL (ref 8.5–10.1)
CALCULATED P AXIS, ECG09: 35 DEGREES
CALCULATED R AXIS, ECG10: 42 DEGREES
CALCULATED T AXIS, ECG11: 117 DEGREES
CHLORIDE SERPL-SCNC: 107 MMOL/L (ref 97–108)
CO2 SERPL-SCNC: 28 MMOL/L (ref 21–32)
CREAT SERPL-MCNC: 1.06 MG/DL (ref 0.7–1.3)
DIAGNOSIS, 93000: NORMAL
ERYTHROCYTE [DISTWIDTH] IN BLOOD BY AUTOMATED COUNT: 11.4 % (ref 11.5–14.5)
EST. AVERAGE GLUCOSE BLD GHB EST-MCNC: 160 MG/DL
GLUCOSE BLD STRIP.AUTO-MCNC: 172 MG/DL (ref 65–100)
GLUCOSE BLD STRIP.AUTO-MCNC: 256 MG/DL (ref 65–100)
GLUCOSE BLD STRIP.AUTO-MCNC: 297 MG/DL (ref 65–100)
GLUCOSE BLD STRIP.AUTO-MCNC: 343 MG/DL (ref 65–100)
GLUCOSE SERPL-MCNC: 289 MG/DL (ref 65–100)
HBA1C MFR BLD: 7.2 % (ref 4–5.6)
HCT VFR BLD AUTO: 36.4 % (ref 36.6–50.3)
HGB BLD-MCNC: 12.5 G/DL (ref 12.1–17)
MCH RBC QN AUTO: 29.5 PG (ref 26–34)
MCHC RBC AUTO-ENTMCNC: 34.3 G/DL (ref 30–36.5)
MCV RBC AUTO: 85.8 FL (ref 80–99)
NRBC # BLD: 0 K/UL (ref 0–0.01)
NRBC BLD-RTO: 0 PER 100 WBC
P-R INTERVAL, ECG05: 160 MS
PERFORMED BY, TECHID: ABNORMAL
PLATELET # BLD AUTO: 286 K/UL (ref 150–400)
PMV BLD AUTO: 11.2 FL (ref 8.9–12.9)
POTASSIUM SERPL-SCNC: 3.9 MMOL/L (ref 3.5–5.1)
Q-T INTERVAL, ECG07: 404 MS
QRS DURATION, ECG06: 88 MS
QTC CALCULATION (BEZET), ECG08: 436 MS
RBC # BLD AUTO: 4.24 M/UL (ref 4.1–5.7)
SODIUM SERPL-SCNC: 142 MMOL/L (ref 136–145)
THERAPEUTIC RANGE,PTTT: ABNORMAL SEC (ref 82–109)
TROPONIN-HIGH SENSITIVITY: 36 NG/L (ref 0–76)
VENTRICULAR RATE, ECG03: 70 BPM
WBC # BLD AUTO: 8.3 K/UL (ref 4.1–11.1)

## 2022-10-19 PROCEDURE — 85730 THROMBOPLASTIN TIME PARTIAL: CPT

## 2022-10-19 PROCEDURE — 74011000250 HC RX REV CODE- 250: Performed by: STUDENT IN AN ORGANIZED HEALTH CARE EDUCATION/TRAINING PROGRAM

## 2022-10-19 PROCEDURE — C1769 GUIDE WIRE: HCPCS | Performed by: STUDENT IN AN ORGANIZED HEALTH CARE EDUCATION/TRAINING PROGRAM

## 2022-10-19 PROCEDURE — 80048 BASIC METABOLIC PNL TOTAL CA: CPT

## 2022-10-19 PROCEDURE — 74011250637 HC RX REV CODE- 250/637: Performed by: INTERNAL MEDICINE

## 2022-10-19 PROCEDURE — 77030040934 HC CATH DIAG DXTERITY MEDT -A: Performed by: STUDENT IN AN ORGANIZED HEALTH CARE EDUCATION/TRAINING PROGRAM

## 2022-10-19 PROCEDURE — 84484 ASSAY OF TROPONIN QUANT: CPT

## 2022-10-19 PROCEDURE — 74011636637 HC RX REV CODE- 636/637: Performed by: INTERNAL MEDICINE

## 2022-10-19 PROCEDURE — 4A023N7 MEASUREMENT OF CARDIAC SAMPLING AND PRESSURE, LEFT HEART, PERCUTANEOUS APPROACH: ICD-10-PCS | Performed by: STUDENT IN AN ORGANIZED HEALTH CARE EDUCATION/TRAINING PROGRAM

## 2022-10-19 PROCEDURE — 77030019698 HC SYR ANGI MDLON MRTM -A: Performed by: STUDENT IN AN ORGANIZED HEALTH CARE EDUCATION/TRAINING PROGRAM

## 2022-10-19 PROCEDURE — B2111ZZ FLUOROSCOPY OF MULTIPLE CORONARY ARTERIES USING LOW OSMOLAR CONTRAST: ICD-10-PCS | Performed by: STUDENT IN AN ORGANIZED HEALTH CARE EDUCATION/TRAINING PROGRAM

## 2022-10-19 PROCEDURE — 76210000006 HC OR PH I REC 0.5 TO 1 HR: Performed by: STUDENT IN AN ORGANIZED HEALTH CARE EDUCATION/TRAINING PROGRAM

## 2022-10-19 PROCEDURE — 74011000636 HC RX REV CODE- 636: Performed by: STUDENT IN AN ORGANIZED HEALTH CARE EDUCATION/TRAINING PROGRAM

## 2022-10-19 PROCEDURE — 83036 HEMOGLOBIN GLYCOSYLATED A1C: CPT

## 2022-10-19 PROCEDURE — 36415 COLL VENOUS BLD VENIPUNCTURE: CPT

## 2022-10-19 PROCEDURE — C1894 INTRO/SHEATH, NON-LASER: HCPCS | Performed by: STUDENT IN AN ORGANIZED HEALTH CARE EDUCATION/TRAINING PROGRAM

## 2022-10-19 PROCEDURE — 74011250636 HC RX REV CODE- 250/636: Performed by: INTERNAL MEDICINE

## 2022-10-19 PROCEDURE — 85027 COMPLETE CBC AUTOMATED: CPT

## 2022-10-19 PROCEDURE — 82962 GLUCOSE BLOOD TEST: CPT

## 2022-10-19 PROCEDURE — 99152 MOD SED SAME PHYS/QHP 5/>YRS: CPT | Performed by: STUDENT IN AN ORGANIZED HEALTH CARE EDUCATION/TRAINING PROGRAM

## 2022-10-19 PROCEDURE — 93458 L HRT ARTERY/VENTRICLE ANGIO: CPT | Performed by: STUDENT IN AN ORGANIZED HEALTH CARE EDUCATION/TRAINING PROGRAM

## 2022-10-19 PROCEDURE — 2709999900 HC NON-CHARGEABLE SUPPLY: Performed by: STUDENT IN AN ORGANIZED HEALTH CARE EDUCATION/TRAINING PROGRAM

## 2022-10-19 PROCEDURE — 77030042317 HC BND COMPR HEMSTAT -B: Performed by: STUDENT IN AN ORGANIZED HEALTH CARE EDUCATION/TRAINING PROGRAM

## 2022-10-19 PROCEDURE — 77030016699 HC CATH ANGI DX INFN1 CARD -A: Performed by: STUDENT IN AN ORGANIZED HEALTH CARE EDUCATION/TRAINING PROGRAM

## 2022-10-19 PROCEDURE — 74011000250 HC RX REV CODE- 250: Performed by: INTERNAL MEDICINE

## 2022-10-19 PROCEDURE — 74011250636 HC RX REV CODE- 250/636: Performed by: STUDENT IN AN ORGANIZED HEALTH CARE EDUCATION/TRAINING PROGRAM

## 2022-10-19 PROCEDURE — 65270000032 HC RM SEMIPRIVATE

## 2022-10-19 RX ORDER — DEXTROSE MONOHYDRATE 100 MG/ML
0-250 INJECTION, SOLUTION INTRAVENOUS AS NEEDED
Status: DISCONTINUED | OUTPATIENT
Start: 2022-10-19 | End: 2022-10-19

## 2022-10-19 RX ORDER — LIDOCAINE HYDROCHLORIDE 10 MG/ML
INJECTION INFILTRATION; PERINEURAL AS NEEDED
Status: DISCONTINUED | OUTPATIENT
Start: 2022-10-19 | End: 2022-10-19 | Stop reason: HOSPADM

## 2022-10-19 RX ORDER — FENTANYL CITRATE 50 UG/ML
INJECTION, SOLUTION INTRAMUSCULAR; INTRAVENOUS AS NEEDED
Status: DISCONTINUED | OUTPATIENT
Start: 2022-10-19 | End: 2022-10-19 | Stop reason: HOSPADM

## 2022-10-19 RX ORDER — INSULIN LISPRO 100 [IU]/ML
8 INJECTION, SOLUTION INTRAVENOUS; SUBCUTANEOUS ONCE
Status: COMPLETED | OUTPATIENT
Start: 2022-10-19 | End: 2022-10-19

## 2022-10-19 RX ORDER — HEPARIN SODIUM 200 [USP'U]/100ML
INJECTION, SOLUTION INTRAVENOUS
Status: COMPLETED | OUTPATIENT
Start: 2022-10-19 | End: 2022-10-19

## 2022-10-19 RX ORDER — SODIUM CHLORIDE 0.9 % (FLUSH) 0.9 %
5-40 SYRINGE (ML) INJECTION EVERY 8 HOURS
Status: DISCONTINUED | OUTPATIENT
Start: 2022-10-19 | End: 2022-10-21 | Stop reason: HOSPADM

## 2022-10-19 RX ORDER — VERAPAMIL HYDROCHLORIDE 2.5 MG/ML
INJECTION, SOLUTION INTRAVENOUS AS NEEDED
Status: DISCONTINUED | OUTPATIENT
Start: 2022-10-19 | End: 2022-10-19 | Stop reason: HOSPADM

## 2022-10-19 RX ORDER — NITROGLYCERIN 5 MG/ML
INJECTION, SOLUTION INTRAVENOUS AS NEEDED
Status: DISCONTINUED | OUTPATIENT
Start: 2022-10-19 | End: 2022-10-19 | Stop reason: HOSPADM

## 2022-10-19 RX ORDER — MAGNESIUM SULFATE 100 %
4 CRYSTALS MISCELLANEOUS AS NEEDED
Status: DISCONTINUED | OUTPATIENT
Start: 2022-10-19 | End: 2022-10-19

## 2022-10-19 RX ORDER — INSULIN LISPRO 100 [IU]/ML
INJECTION, SOLUTION INTRAVENOUS; SUBCUTANEOUS
Status: DISCONTINUED | OUTPATIENT
Start: 2022-10-19 | End: 2022-10-21 | Stop reason: HOSPADM

## 2022-10-19 RX ORDER — SODIUM CHLORIDE 0.9 % (FLUSH) 0.9 %
5-40 SYRINGE (ML) INJECTION AS NEEDED
Status: DISCONTINUED | OUTPATIENT
Start: 2022-10-19 | End: 2022-10-21 | Stop reason: HOSPADM

## 2022-10-19 RX ORDER — MIDAZOLAM HYDROCHLORIDE 1 MG/ML
INJECTION INTRAMUSCULAR; INTRAVENOUS AS NEEDED
Status: DISCONTINUED | OUTPATIENT
Start: 2022-10-19 | End: 2022-10-19 | Stop reason: HOSPADM

## 2022-10-19 RX ORDER — HEPARIN SODIUM 1000 [USP'U]/ML
INJECTION, SOLUTION INTRAVENOUS; SUBCUTANEOUS AS NEEDED
Status: DISCONTINUED | OUTPATIENT
Start: 2022-10-19 | End: 2022-10-19 | Stop reason: HOSPADM

## 2022-10-19 RX ADMIN — ESCITALOPRAM OXALATE 10 MG: 10 TABLET ORAL at 09:40

## 2022-10-19 RX ADMIN — LABETALOL HYDROCHLORIDE 100 MG: 100 TABLET, FILM COATED ORAL at 21:11

## 2022-10-19 RX ADMIN — INSULIN LISPRO 8 UNITS: 100 INJECTION, SOLUTION INTRAVENOUS; SUBCUTANEOUS at 23:12

## 2022-10-19 RX ADMIN — LABETALOL HYDROCHLORIDE 100 MG: 100 TABLET, FILM COATED ORAL at 09:40

## 2022-10-19 RX ADMIN — SODIUM CHLORIDE, PRESERVATIVE FREE 10 ML: 5 INJECTION INTRAVENOUS at 21:15

## 2022-10-19 RX ADMIN — HEPARIN SODIUM AND DEXTROSE 16 UNITS/KG/HR: 10000; 5 INJECTION INTRAVENOUS at 09:45

## 2022-10-19 RX ADMIN — INSULIN GLARGINE 50 UNITS: 100 INJECTION, SOLUTION SUBCUTANEOUS at 21:12

## 2022-10-19 RX ADMIN — ASPIRIN 81 MG 81 MG: 81 TABLET ORAL at 09:40

## 2022-10-19 RX ADMIN — LOSARTAN POTASSIUM 100 MG: 50 TABLET, FILM COATED ORAL at 09:40

## 2022-10-19 RX ADMIN — CLOPIDOGREL BISULFATE 75 MG: 75 TABLET ORAL at 09:40

## 2022-10-19 RX ADMIN — SODIUM CHLORIDE, PRESERVATIVE FREE 10 ML: 5 INJECTION INTRAVENOUS at 18:18

## 2022-10-19 RX ADMIN — HEPARIN SODIUM 2000 UNITS: 1000 INJECTION, SOLUTION INTRAVENOUS; SUBCUTANEOUS at 02:31

## 2022-10-19 RX ADMIN — HEPARIN SODIUM AND DEXTROSE 16 UNITS/KG/HR: 10000; 5 INJECTION INTRAVENOUS at 10:41

## 2022-10-19 RX ADMIN — INSULIN LISPRO 2 UNITS: 100 INJECTION, SOLUTION INTRAVENOUS; SUBCUTANEOUS at 17:57

## 2022-10-19 RX ADMIN — SODIUM CHLORIDE, PRESERVATIVE FREE 10 ML: 5 INJECTION INTRAVENOUS at 05:15

## 2022-10-19 RX ADMIN — SODIUM CHLORIDE, PRESERVATIVE FREE 10 ML: 5 INJECTION INTRAVENOUS at 14:37

## 2022-10-19 RX ADMIN — ATORVASTATIN CALCIUM 40 MG: 40 TABLET, FILM COATED ORAL at 09:40

## 2022-10-19 RX ADMIN — HEPARIN SODIUM 2000 UNITS: 1000 INJECTION, SOLUTION INTRAVENOUS; SUBCUTANEOUS at 09:44

## 2022-10-19 NOTE — CONSULTS
CONSULTATION    REASON FOR CONSULT:  Chest pain    REQUESTING PROVIDER:  Dr. Bains Promise:  No chief complaint on file. HISTORY OF PRESENT ILLNESS:  Marybeth Funes is a 60y.o. year-old male with past medical history significant for hypertension, diabetes, and prior stroke who was admitted to the hospital for further evaluation of chest pain. Patient was seen in the office on 10/18/22 for a treadmill stress test, after the test the patient developed chest pain which persisted for the rest of the day. Patient is awake and alert. Denies chest pain or shortness of breath at this time. Offers no other complaints. Significant labs include: HS troponin 41 > 36, EKG: NSR: 70, non-specific ST/T wave abnormalities. ST depression in lateral leads. Records from hospital admission course thus far reviewed. Telemetry Review: No acute events noted since admission. NSR; HR 60s, no ectopy.        PAST MEDICAL HISTORY:    Past Medical History:   Diagnosis Date    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    Atherosclerosis of arteries of extremities ([address] 7/15/2020    Cataract 7/15/2020    Coronary arteriosclerosis in native artery 7/15/2020    Diabetes mellitus type 2, controlled ([address] 7/15/2020    Diabetic mononeuropathy associated with type 2 diabetes mellitus ([address] 7/15/2020    Diabetic peripheral neuropathy ([address] 7/15/2020    EKG, abnormal 7/15/2020    Elevated liver enzymes 7/15/2020    Erectile dysfunction 7/15/2020    Essential hypertension 7/15/2020    Hepatitis     High cholesterol     Hyperlipidemia 7/15/2020    Hypertension     Pain in both lower legs 7/15/2020    Peripheral arterial occlusive disease ([address] 7/15/2020    Peripheral circulatory disorder associated with type 2 diabetes mellitus ([address] 7/15/2020    Spasm of back muscles 7/15/2020    Ulcer of foot ([address] 7/15/2020       PAST SURGICAL HISTORY:   Past Surgical History:   Procedure Laterality Date    HX CATARACT REMOVAL Bilateral     HX ORTHOPAEDIC Right     stents placed    HX ORTHOPAEDIC Left     HX WISDOM TEETH EXTRACTION      IR STENT PLACEMENT      Right Leg       ALLERGIES:  No Known Allergies    FAMILY HISTORY:    Family History   Problem Relation Age of Onset    Diabetes Mother     Heart Disease Mother     Diabetes Father     Diabetes Sister     Diabetes Brother        SOCIAL HISTORY:    Tobacco: non-smoker  Drugs: not reviewed  ETOH: not reviewed    HOME MEDICATIONS:    Prior to Admission Medications   Prescriptions Last Dose Informant Patient Reported? Taking? Insulin Syringe-Needle U-100 0.3 mL 30 gauge x 5/16\" syrg   No No   Sig: Use to inject novolin in sliding scale as indicated 3 times per day before meals   NovoLOG Flexpen U-100 Insulin 100 unit/mL (3 mL) inpn   No No   Sig: INJECT 2 UNITS PER 50u above 150mg/dl 3 times daily before meals as needed according to sliding scale. max of 36 units daily  Indications: type 2 diabetes mellitus   alcohol swabs (Alcohol Pads) padm   No No   Sig: Use to check glucose 3 times a day before meals   aspirin 81 mg chewable tablet   No No   Sig: Take 1 Tablet by mouth daily. atorvastatin (LIPITOR) 40 mg tablet   No No   Sig: Take 1 Tablet by mouth daily. clopidogreL (PLAVIX) 75 mg tab   No No   Sig: Take 1 Tablet by mouth daily. escitalopram oxalate (LEXAPRO) 10 mg tablet   No No   Sig: Take 1 Tablet by mouth daily. glucose 4 gram chewable tablet   No No   Sig: Take 4 Tablets by mouth as needed for PRN Reason (Other) (hypoglycemia.). glucose blood VI test strips (Prodigy No Coding) strip   No No   Sig: TEST  GLUCOSE 4 times DAILY   hydroCHLOROthiazide (HYDRODIURIL) 25 mg tablet   No No   Sig: Take 1 Tablet by mouth daily.    insulin glargine (LANTUS) 100 unit/mL injection   No No   Si units at bed time   labetaloL (NORMODYNE) 100 mg tablet   No No   Sig: TAKE ONE TABLET BY MOUTH TWICE DAILY FOR 90 DAYS   lancets (Prodigy Twist Top Lancet) 28 gauge misc   No No   Sig: by Does Not Apply route Before breakfast, lunch, dinner and at bedtime. lidocaine (LIDODERM) 5 %   No No   Sig: APPLY 1 PATCH TO THE AFFECTED AREA FOR 12 HOURS (ON FOR 12 HOURS THEN OFF FOR 12 HOURS)   liraglutide (Victoza 3-Alfredo) 0.6 mg/0.1 mL (18 mg/3 mL) pnij   No No   Si.8 mg by SubCUTAneous route daily for 270 days. losartan (COZAAR) 100 mg tablet   No No   Sig: Take 1 Tablet by mouth daily. Indications: high blood pressure   traZODone (DESYREL) 50 mg tablet   No No   Sig: Take 1 Tablet by mouth nightly as needed for Sleep. Indications: major depressive disorder      Facility-Administered Medications: None       REVIEW OF SYSTEMS:  Complete review of systems performed, pertinents noted above, all other systems are negative. Patient Vitals for the past 24 hrs:   Temp Pulse Resp BP SpO2   10/19/22 1200 -- 64 -- -- --   10/19/22 1044 97.5 °F (36.4 °C) 72 20 120/75 98 %   10/19/22 0820 97.6 °F (36.4 °C) 63 22 (!) 147/85 98 %   10/19/22 0800 -- 71 -- -- --   10/19/22 0400 -- 67 -- -- --   10/19/22 0255 97.8 °F (36.6 °C) 67 24 (!) 146/82 98 %   10/19/22 0000 -- 85 -- -- --   10/18/22 2233 97.8 °F (36.6 °C) 80 24 127/72 94 %   10/18/22 1844 -- 71 24 -- 99 %   10/18/22 1840 -- -- -- -- 98 %   10/18/22 1839 -- 73 15 -- --   10/18/22 1817 97.8 °F (36.6 °C) 77 16 (!) 143/86 97 %       PHYSICAL EXAMINATION:    General: NAD, A&O  HEENT: Normocephalic, PERRL, no drainage  Neck: Supple, Trachea midline, No JVD  RESP: CTA bilaterally. + Symmetrical chest movement. No SOB or distress. On RA  Cardiovascular: RRR no MRG  PVS: No rubor, cyanosis, no edema  ABD: soft, NT, Normoactive BS  Derm: Warm/Dry/Intact with no lesions  Neuro: A&O PPTS,  No focal deficits  PSYCH: No anxiety or agitation    Recent labs results and imaging reviewed.       Recent Results (from the past 24 hour(s))   EKG, 12 LEAD, INITIAL    Collection Time: 10/18/22  6:20 PM   Result Value Ref Range    Ventricular Rate 70 BPM    Atrial Rate 70 BPM P-R Interval 160 ms    QRS Duration 88 ms    Q-T Interval 404 ms    QTC Calculation (Bezet) 436 ms    Calculated P Axis 35 degrees    Calculated R Axis 42 degrees    Calculated T Axis 117 degrees    Diagnosis       Normal sinus rhythm  Cannot rule out Inferior infarct (cited on or before 17-AUG-2004)  Anterior infarct (cited on or before 17-AUG-2004)  ST & T wave abnormality, consider lateral ischemia  Abnormal ECG  When compared with ECG of 18-SEP-2014 15:38,  ST now depressed in Lateral leads  Nonspecific T wave abnormality no longer evident in Inferior leads  Confirmed by Earna Giana ([id number]) on 10/19/2022 10:36:50 AM     TROPONIN-HIGH SENSITIVITY    Collection Time: 10/18/22  6:40 PM   Result Value Ref Range    Troponin-High Sensitivity 41 0 - 76 ng/L   CBC WITH AUTOMATED DIFF    Collection Time: 10/18/22  6:44 PM   Result Value Ref Range    WBC 4.3 4.1 - 11.1 K/uL    RBC 4.47 4.10 - 5.70 M/uL    HGB 13.0 12.1 - 17.0 g/dL    HCT 38.5 36.6 - 50.3 %    MCV 86.1 80.0 - 99.0 FL    MCH 29.1 26.0 - 34.0 PG    MCHC 33.8 30.0 - 36.5 g/dL    RDW 11.6 11.5 - 14.5 %    PLATELET 205 213 - 043 K/uL    MPV 11.2 8.9 - 12.9 FL    NRBC 0.0 0.0  WBC    ABSOLUTE NRBC 0.00 0.00 - 0.01 K/uL    NEUTROPHILS 77 (H) 32 - 75 %    LYMPHOCYTES 18 12 - 49 %    MONOCYTES 4 (L) 5 - 13 %    EOSINOPHILS 0 0 - 7 %    BASOPHILS 1 0 - 1 %    IMMATURE GRANULOCYTES 0 0 - 0.5 %    ABS. NEUTROPHILS 3.4 1.8 - 8.0 K/UL    ABS. LYMPHOCYTES 0.8 0.8 - 3.5 K/UL    ABS. MONOCYTES 0.2 0.0 - 1.0 K/UL    ABS. EOSINOPHILS 0.0 0.0 - 0.4 K/UL    ABS. BASOPHILS 0.0 0.0 - 0.1 K/UL    ABS. IMM.  GRANS. 0.0 0.00 - 0.04 K/UL    DF AUTOMATED     METABOLIC PANEL, COMPREHENSIVE    Collection Time: 10/18/22  6:44 PM   Result Value Ref Range    Sodium 140 136 - 145 mmol/L    Potassium 4.1 3.5 - 5.1 mmol/L    Chloride 107 97 - 108 mmol/L    CO2 30 21 - 32 mmol/L    Anion gap 3 (L) 5 - 15 mmol/L    Glucose 299 (H) 65 - 100 mg/dL    BUN 14 6 - 20 mg/dL    Creatinine 1. 16 0.70 - 1.30 mg/dL    BUN/Creatinine ratio 12 12 - 20      eGFR >60 >60 ml/min/1.73m2    Calcium 9.3 8.5 - 10.1 mg/dL    Bilirubin, total 0.3 0.2 - 1.0 mg/dL    AST (SGOT) 24 15 - 37 U/L    ALT (SGPT) 50 12 - 78 U/L    Alk.  phosphatase 67 45 - 117 U/L    Protein, total 6.8 6.4 - 8.2 g/dL    Albumin 3.6 3.5 - 5.0 g/dL    Globulin 3.2 2.0 - 4.0 g/dL    A-G Ratio 1.1 1.1 - 2.2     PROTHROMBIN TIME + INR    Collection Time: 10/18/22  6:44 PM   Result Value Ref Range    Prothrombin time 13.1 11.9 - 14.6 sec    INR 1.0 0.9 - 1.1     PTT    Collection Time: 10/18/22  6:44 PM   Result Value Ref Range    aPTT 26.8 21.2 - 34.1 sec    aPTT, therapeutic range   82 - 109 sec   GLUCOSE, POC    Collection Time: 10/18/22 10:56 PM   Result Value Ref Range    Glucose (POC) 298 (H) 65 - 100 mg/dL    Performed by Angel Whitman    PTT    Collection Time: 10/19/22  1:19 AM   Result Value Ref Range    aPTT 61.3 (H) 21.2 - 34.1 sec    aPTT, therapeutic range   82 - 069 sec   METABOLIC PANEL, BASIC    Collection Time: 10/19/22  7:30 AM   Result Value Ref Range    Sodium 142 136 - 145 mmol/L    Potassium 3.9 3.5 - 5.1 mmol/L    Chloride 107 97 - 108 mmol/L    CO2 28 21 - 32 mmol/L    Anion gap 7 5 - 15 mmol/L    Glucose 289 (H) 65 - 100 mg/dL    BUN 14 6 - 20 mg/dL    Creatinine 1.06 0.70 - 1.30 mg/dL    BUN/Creatinine ratio 13 12 - 20      eGFR >60 >60 ml/min/1.73m2    Calcium 9.2 8.5 - 10.1 mg/dL   CBC W/O DIFF    Collection Time: 10/19/22  7:30 AM   Result Value Ref Range    WBC 8.3 4.1 - 11.1 K/uL    RBC 4.24 4.10 - 5.70 M/uL    HGB 12.5 12.1 - 17.0 g/dL    HCT 36.4 (L) 36.6 - 50.3 %    MCV 85.8 80.0 - 99.0 FL    MCH 29.5 26.0 - 34.0 PG    MCHC 34.3 30.0 - 36.5 g/dL    RDW 11.4 (L) 11.5 - 14.5 %    PLATELET 595 138 - 073 K/uL    MPV 11.2 8.9 - 12.9 FL    NRBC 0.0 0.0  WBC    ABSOLUTE NRBC 0.00 0.00 - 0.01 K/uL   TROPONIN-HIGH SENSITIVITY    Collection Time: 10/19/22  7:30 AM   Result Value Ref Range    Troponin-High Sensitivity 36 0 - 76 ng/L   GLUCOSE, POC    Collection Time: 10/19/22  8:23 AM   Result Value Ref Range    Glucose (POC) 297 (H) 65 - 100 mg/dL    Performed by Raegan Philippe    PTT    Collection Time: 10/19/22  8:53 AM   Result Value Ref Range    aPTT 61.8 (H) 21.2 - 34.1 sec    aPTT, therapeutic range   82 - 109 sec   GLUCOSE, POC    Collection Time: 10/19/22 10:46 AM   Result Value Ref Range    Glucose (POC) 256 (H) 65 - 100 mg/dL    Performed by JEANINE NEWTON        XR Results (maximum last 3): Results from [place] encounter on 10/18/22    XR CHEST PORT    Impression  No acute process on portable chest.      Results from [place] encounter on 09/20/22    XR WRIST RT AP/LAT/OBL MIN 3V    Impression  Radiocarpal joint degenerative changes. .        Current Facility-Administered Medications:     heparin 25,000 units in D5W 250 ml infusion, 12-25 Units/kg/hr, IntraVENous, TITRATE, Evy Simon MD, Last Rate: 17.4 mL/hr at 10/19/22 1041, 16 Units/kg/hr at 10/19/22 1041    heparin (porcine) 1,000 unit/mL injection 4,000 Units, 4,000 Units, IntraVENous, PRN **OR** heparin (porcine) 1,000 unit/mL injection 2,000 Units, 2,000 Units, IntraVENous, PRN, Yohana Chou MD, 2,000 Units at 10/19/22 0944    aspirin chewable tablet 81 mg, 81 mg, Oral, DAILY, Evy Simon MD, 81 mg at 10/19/22 0940    atorvastatin (LIPITOR) tablet 40 mg, 40 mg, Oral, DAILY, Evy Simon MD, 40 mg at 10/19/22 0940    clopidogreL (PLAVIX) tablet 75 mg, 75 mg, Oral, DAILY, Evy Simon MD, 75 mg at 10/19/22 0940    escitalopram oxalate (LEXAPRO) tablet 10 mg, 10 mg, Oral, DAILY, Evy Simon MD, 10 mg at 10/19/22 0940    insulin glargine (LANTUS) injection 50 Units, 50 Units, SubCUTAneous, QHS, Evy Simon MD, 50 Units at 10/18/22 5884    insulin lispro (HUMALOG) injection, , SubCUTAneous, TIDAC, Evy Simon MD    glucose chewable tablet 16 g, 4 Tablet, Oral, PRN, Evy Simon MD    glucagon (GLUCAGEN) injection 1 mg, 1 mg, IntraMUSCular, PRN, Gissel Simon MD    dextrose 10% infusion 0-250 mL, 0-250 mL, IntraVENous, PRN, Gissel Simon MD    labetaloL (NORMODYNE) tablet 100 mg, 100 mg, Oral, BID, Gissel Simon MD, 100 mg at 10/19/22 0940    losartan (COZAAR) tablet 100 mg, 100 mg, Oral, DAILY, Gissel Simon MD, 100 mg at 10/19/22 0940    sodium chloride (NS) flush 5-40 mL, 5-40 mL, IntraVENous, Q8H, Gissel Simon MD, 10 mL at 10/19/22 0515    sodium chloride (NS) flush 5-40 mL, 5-40 mL, IntraVENous, PRN, Zacarias Rodriguez MD    acetaminophen (TYLENOL) tablet 650 mg, 650 mg, Oral, Q6H PRN **OR** acetaminophen (TYLENOL) suppository 650 mg, 650 mg, Rectal, Q6H PRN, Zacarias Rodriguez MD    polyethylene glycol (MIRALAX) packet 17 g, 17 g, Oral, DAILY PRN, Gissel Simon MD    ondansetron (ZOFRAN ODT) tablet 4 mg, 4 mg, Oral, Q8H PRN **OR** ondansetron (ZOFRAN) injection 4 mg, 4 mg, IntraVENous, Q6H PRN, Gissel Simon MD    nitroglycerin (NITROSTAT) tablet 0.4 mg, 0.4 mg, SubLINGual, PRN, Zacarias Rodriguez MD      Case discussed with collaborating physician Dr. Alissa Grijalva and our impression and recommendations are as follows:     Chest pain:   - c/o of persistent CP and pressure after OP exercise stress test  - HS troponin 41 > 36  - currently on heparin gtt and asa 81 mg daily  - discussed the risks and benefits of a cardiac catheterization with the patient and he has agreed to the procedure. Written consent was obtained. Patient remains NPO.     2. Hypertension:   - blood pressure at goal.  - continue labetalol 100 mg daily and losartan 100 mg    3. DM II:   - management per primary    Thank you for involving us in the care of this patient. Please do not hesitate to call if additional questions arise. If after hours please call [phone]. Dr. [facility]  [address], [address] [facility]  [phone]

## 2022-10-19 NOTE — PROGRESS NOTES
1715: Dr. Holden Perez to bedside. Discussed plan of care with pt. Dr. Evelin Sierra pt.'s wife at this time. 1730: Spoke to pt.'s wife via phone, transferred to cordless phone to speak with pt.  Pt.'s wife reports she spoke to Dr. Holden Perez.

## 2022-10-19 NOTE — ROUTINE PROCESS
Bedside and Verbal shift change report given to JUAN Winters (oncoming nurse) by Wilda العلي RN (offgoing nurse). Report included the following information Kardex and MAR.

## 2022-10-19 NOTE — ROUTINE PROCESS
Spoke with Dr. Donnell Callaway regarding the patients NPO status, new order received for patient to be NPO with sips of meds. No further orders at this time.      Jose Ventura RN

## 2022-10-19 NOTE — PROGRESS NOTES
Please restart heparin drip back on patient 1 hour after TR band has been removed if no bleeding or hematoma seen. Can restart at dose patient was on before cath and follow algorithm there after for changes.

## 2022-10-19 NOTE — ROUTINE PROCESS
Two nurse skin assessment performed with Stevie Nogueira RN. Skin clean dry and intact. No open areas/wounds noted.      Kenneth Renee RN

## 2022-10-19 NOTE — ROUTINE PROCESS
Spoke with Dr. Morelia Aden regarding the patients blood sugar and humalog order. New order received to hold humalog due to NPO status.      Kristyn Morel RN

## 2022-10-19 NOTE — PROGRESS NOTES
Hospitalist Progress Note         Mendel Homer, MD          Daily Progress Note: 10/19/2022      Subjective: The patient is seen for follow  up. 60 y.o. male with PMH of hypertension, diabetes, hyperlipidemia. He presented to the ED with chief complaint of chest pain. Patient reports over the last 2 weeks the patient has been having exertional chest pain. He was following with his cardiologist today over at [facility] cardiology where he was receiving a stress test and began to have chest pain. Chest pain is squeezing in nature, rated 8/10 at maximum. Substernal, nonradiating. Worsening with exertion. Otherwise denies dizziness, diaphoresis, nausea or vomiting. In ED, troponin negative, EKG without SANTY. Heparin drip started. Seen on follow up. Patient is sitting in bed on room air.  He has not had any pain or shortness of breath and denies any other symptoms      Problem List:  Problem List as of 10/19/2022 Date Reviewed: 12/14/2021            Codes Class Noted - Resolved    Chest pain ICD-10-CM: R07.9  ICD-9-CM: 786.50  10/18/2022 - Present        MDD (major depressive disorder) ICD-10-CM: F32.9  ICD-9-CM: 296.20  1/16/2022 - Present        Suicidal ideation ICD-10-CM: R45.851  ICD-9-CM: V62.84  1/16/2022 - Present        Cocaine abuse ([facility] ICD-10-CM: F14.10  ICD-9-CM: 305.60  1/16/2022 - Present        Prostate cancer screening ICD-10-CM: Z12.5  ICD-9-CM: V76.44  8/20/2021 - Present    Overview Signed 8/20/2021 10:03 AM by Jayant Chance NP     3/22/21: PSA was 0.2             Low testosterone ICD-10-CM: R79.89  ICD-9-CM: 790.99  8/20/2021 - Present    Overview Signed 8/20/2021 10:04 AM by Jayant Chance NP     3/22/21 serum T was 206  4/6/21 serum T was 323, FSH and LH normal.             Fissure in skin ICD-10-CM: R23.4  ICD-9-CM: 709.8  5/27/2021 - Present        Severe obesity ([facility] ICD-10-CM: E66.01  ICD-9-CM: 278.01  3/22/2021 - Present        Acquired left foot drop ICD-10-CM: M21.372  ICD-9-CM: 736.79  3/22/2021 - Present        Mononeuropathy ICD-10-CM: G58.9  ICD-9-CM: 355.9  11/27/2020 - Present    Overview Signed 11/27/2020  2:07 PM by Denise Vincent     Due to type 2 diabetes mellitus             Peripheral circulatory disorder due to type 2 diabetes mellitus ([facility] ICD-10-CM: E11.51  ICD-9-CM: 250.70, 443.81  11/27/2020 - Present        Onychomycosis ICD-10-CM: B35.1  ICD-9-CM: 110.1  8/11/2020 - Present        Plantar fasciitis of right foot ICD-10-CM: M72.2  ICD-9-CM: 728.71  8/11/2020 - Present        Severe obesity (BMI 35.0-35.9 with comorbidity) ([facility] ICD-10-CM: E66.01, Z68.35  ICD-9-CM: 278.01, V85.35  7/27/2020 - Present    Overview Signed 8/20/2021 10:05 AM by Ramona Ferrell NP     BMI 35.74 on 8/20/21             Cataract ICD-10-CM: H26.9  ICD-9-CM: 366.9  7/15/2020 - Present        Diabetic peripheral neuropathy ([facility] ICD-10-CM: E11.42  ICD-9-CM: 250.60, 357.2  7/15/2020 - Present        Abnormal Q waves on electrocardiogram ICD-10-CM: R94.31  ICD-9-CM: 794.31  7/15/2020 - Present        Elevated liver enzymes ICD-10-CM: R74.8  ICD-9-CM: 790.5  7/15/2020 - Present        Essential hypertension ICD-10-CM: I10  ICD-9-CM: 401.9  7/15/2020 - Present        Hyperlipidemia ICD-10-CM: E78.5  ICD-9-CM: 272.4  7/15/2020 - Present        Diabetic mononeuropathy associated with type 2 diabetes mellitus ([facility] ICD-10-CM: E11.41  ICD-9-CM: 250.60, 355.9  7/15/2020 - Present        Pain in both lower legs ICD-10-CM: M79.661, M79.662  ICD-9-CM: 729.5  7/15/2020 - Present        Peripheral arterial occlusive disease ([facility] ICD-10-CM: I77.9  ICD-9-CM: 444.22  7/15/2020 - Present        Peripheral circulatory disorder associated with type 2 diabetes mellitus ([facility] ICD-10-CM: E11.51  ICD-9-CM: 250.70  7/15/2020 - Present        ED (erectile dysfunction) ICD-10-CM: N52.9  ICD-9-CM: 607.84  7/15/2020 - Present    Overview Signed 8/20/2021 10:02 AM by Artur Skaggs Kaylene Lange NP     4/9/21 John Camejo): erectile dysfunction, gradual deterioration since 2014 at which time patient had a CVA. The patient was given sildenafil 20 mg tablets which were not really effective. He was prescribed Trimix.               Spasm of back muscles ICD-10-CM: M62.830  ICD-9-CM: 724.8  7/15/2020 - Present        Diabetes mellitus type 2, controlled ([facility] ICD-10-CM: E11.9  ICD-9-CM: 250.00  7/15/2020 - Present    Overview Signed 8/20/2021 10:05 AM by Percy Hayes NP     3/22/21 hemoglobin A1c was 8.2             Arteriosclerotic gangrene (HCC) ICD-10-CM: H69.854  ICD-9-CM: 440.24  7/15/2020 - Present        Atherosclerosis of arteries of extremities (HCC) ICD-10-CM: I70.209  ICD-9-CM: 440.20  7/15/2020 - Present    Overview Signed 8/20/2021 10:07 AM by Percy Hayes NP     PAD             Coronary arteriosclerosis in native artery ICD-10-CM: I25.10  ICD-9-CM: 414.01  7/15/2020 - Present        Ulcer of foot [facility]) ICD-10-CM: Z26.394  ICD-9-CM: 707.15  7/15/2020 - Present           Medications reviewed  Current Facility-Administered Medications   Medication Dose Route Frequency    heparin 25,000 units in D5W 250 ml infusion  12-25 Units/kg/hr IntraVENous TITRATE    heparin (porcine) 1,000 unit/mL injection 4,000 Units  4,000 Units IntraVENous PRN    Or    heparin (porcine) 1,000 unit/mL injection 2,000 Units  2,000 Units IntraVENous PRN    aspirin chewable tablet 81 mg  81 mg Oral DAILY    atorvastatin (LIPITOR) tablet 40 mg  40 mg Oral DAILY    clopidogreL (PLAVIX) tablet 75 mg  75 mg Oral DAILY    escitalopram oxalate (LEXAPRO) tablet 10 mg  10 mg Oral DAILY    insulin glargine (LANTUS) injection 50 Units  50 Units SubCUTAneous QHS    insulin lispro (HUMALOG) injection   SubCUTAneous TIDAC    glucose chewable tablet 16 g  4 Tablet Oral PRN    glucagon (GLUCAGEN) injection 1 mg  1 mg IntraMUSCular PRN    dextrose 10% infusion 0-250 mL  0-250 mL IntraVENous PRN    labetaloL (NORMODYNE) tablet 100 mg  100 mg Oral BID    losartan (COZAAR) tablet 100 mg  100 mg Oral DAILY    sodium chloride (NS) flush 5-40 mL  5-40 mL IntraVENous Q8H    sodium chloride (NS) flush 5-40 mL  5-40 mL IntraVENous PRN    acetaminophen (TYLENOL) tablet 650 mg  650 mg Oral Q6H PRN    Or    acetaminophen (TYLENOL) suppository 650 mg  650 mg Rectal Q6H PRN    polyethylene glycol (MIRALAX) packet 17 g  17 g Oral DAILY PRN    ondansetron (ZOFRAN ODT) tablet 4 mg  4 mg Oral Q8H PRN    Or    ondansetron (ZOFRAN) injection 4 mg  4 mg IntraVENous Q6H PRN    nitroglycerin (NITROSTAT) tablet 0.4 mg  0.4 mg SubLINGual PRN       Review of Systems:   A comprehensive review of systems was negative except for that written in the HPI. Objective:   Physical Exam:     Visit Vitals  /75 (BP 1 Location: Left upper arm, BP Patient Position: Supine)   Pulse 72   Temp 97.5 °F (36.4 °C)   Resp 20   Ht 5' 9\" (1.753 m)   Wt 108.9 kg (240 lb)   SpO2 98%   BMI 35.44 kg/m²      O2 Device: None (Room air)    Temp (24hrs), Av.7 °F (36.5 °C), Min:97.5 °F (36.4 °C), Max:97.8 °F (36.6 °C)    10/19 07 - 10/19 190  In: -   Out: 2496 [Urine:1050]   No intake/output data recorded. General:  Alert, cooperative, no distress, appears stated age. Lungs:   Clear to auscultation bilaterally. Chest wall:  No tenderness or deformity. Heart:  Regular rate and rhythm, S1, S2 normal, no murmur, click, rub or gallop. Abdomen:   Soft, non-tender. Bowel sounds normal. No masses,  No organomegaly. Extremities: Extremities normal, atraumatic, no cyanosis or edema. Pulses: 2+ and symmetric all extremities. Skin: Skin color, texture, turgor normal. No rashes or lesions   Neurologic: CNII-XII intact.  No gross sensory or motor deficits     Data Review:       Recent Days:  Recent Labs     10/19/22  0730 10/18/22  1844   WBC 8.3 4.3   HGB 12.5 13.0   HCT 36.4* 38.5    311     Recent Labs     10/19/22  0730 10/18/22  1844    140   K 3.9 4.1    107   CO2 28 30   * 299*   BUN 14 14   CREA 1.06 1.16   CA 9.2 9.3   ALB  --  3.6   TBILI  --  0.3   ALT  --  50   INR  --  1.0     No results for input(s): PH, PCO2, PO2, HCO3, FIO2 in the last 72 hours. 24 Hour Results:  Recent Results (from the past 24 hour(s))   EKG, 12 LEAD, INITIAL    Collection Time: 10/18/22  6:20 PM   Result Value Ref Range    Ventricular Rate 70 BPM    Atrial Rate 70 BPM    P-R Interval 160 ms    QRS Duration 88 ms    Q-T Interval 404 ms    QTC Calculation (Bezet) 436 ms    Calculated P Axis 35 degrees    Calculated R Axis 42 degrees    Calculated T Axis 117 degrees    Diagnosis       Normal sinus rhythm  Cannot rule out Inferior infarct (cited on or before 17-AUG-2004)  Anterior infarct (cited on or before 17-AUG-2004)  ST & T wave abnormality, consider lateral ischemia  Abnormal ECG  When compared with ECG of 18-SEP-2014 15:38,  ST now depressed in Lateral leads  Nonspecific T wave abnormality no longer evident in Inferior leads  Confirmed by Fauzia Alvarez ([id number]) on 10/19/2022 10:36:50 AM     TROPONIN-HIGH SENSITIVITY    Collection Time: 10/18/22  6:40 PM   Result Value Ref Range    Troponin-High Sensitivity 41 0 - 76 ng/L   CBC WITH AUTOMATED DIFF    Collection Time: 10/18/22  6:44 PM   Result Value Ref Range    WBC 4.3 4.1 - 11.1 K/uL    RBC 4.47 4.10 - 5.70 M/uL    HGB 13.0 12.1 - 17.0 g/dL    HCT 38.5 36.6 - 50.3 %    MCV 86.1 80.0 - 99.0 FL    MCH 29.1 26.0 - 34.0 PG    MCHC 33.8 30.0 - 36.5 g/dL    RDW 11.6 11.5 - 14.5 %    PLATELET 652 028 - 072 K/uL    MPV 11.2 8.9 - 12.9 FL    NRBC 0.0 0.0  WBC    ABSOLUTE NRBC 0.00 0.00 - 0.01 K/uL    NEUTROPHILS 77 (H) 32 - 75 %    LYMPHOCYTES 18 12 - 49 %    MONOCYTES 4 (L) 5 - 13 %    EOSINOPHILS 0 0 - 7 %    BASOPHILS 1 0 - 1 %    IMMATURE GRANULOCYTES 0 0 - 0.5 %    ABS. NEUTROPHILS 3.4 1.8 - 8.0 K/UL    ABS. LYMPHOCYTES 0.8 0.8 - 3.5 K/UL    ABS. MONOCYTES 0.2 0.0 - 1.0 K/UL    ABS. EOSINOPHILS 0.0 0.0 - 0.4 K/UL    ABS. BASOPHILS 0.0 0.0 - 0.1 K/UL    ABS. IMM. GRANS. 0.0 0.00 - 0.04 K/UL    DF AUTOMATED     METABOLIC PANEL, COMPREHENSIVE    Collection Time: 10/18/22  6:44 PM   Result Value Ref Range    Sodium 140 136 - 145 mmol/L    Potassium 4.1 3.5 - 5.1 mmol/L    Chloride 107 97 - 108 mmol/L    CO2 30 21 - 32 mmol/L    Anion gap 3 (L) 5 - 15 mmol/L    Glucose 299 (H) 65 - 100 mg/dL    BUN 14 6 - 20 mg/dL    Creatinine 1.16 0.70 - 1.30 mg/dL    BUN/Creatinine ratio 12 12 - 20      eGFR >60 >60 ml/min/1.73m2    Calcium 9.3 8.5 - 10.1 mg/dL    Bilirubin, total 0.3 0.2 - 1.0 mg/dL    AST (SGOT) 24 15 - 37 U/L    ALT (SGPT) 50 12 - 78 U/L    Alk.  phosphatase 67 45 - 117 U/L    Protein, total 6.8 6.4 - 8.2 g/dL    Albumin 3.6 3.5 - 5.0 g/dL    Globulin 3.2 2.0 - 4.0 g/dL    A-G Ratio 1.1 1.1 - 2.2     PROTHROMBIN TIME + INR    Collection Time: 10/18/22  6:44 PM   Result Value Ref Range    Prothrombin time 13.1 11.9 - 14.6 sec    INR 1.0 0.9 - 1.1     PTT    Collection Time: 10/18/22  6:44 PM   Result Value Ref Range    aPTT 26.8 21.2 - 34.1 sec    aPTT, therapeutic range   82 - 109 sec   GLUCOSE, POC    Collection Time: 10/18/22 10:56 PM   Result Value Ref Range    Glucose (POC) 298 (H) 65 - 100 mg/dL    Performed by [facility]    PTT    Collection Time: 10/19/22  1:19 AM   Result Value Ref Range    aPTT 61.3 (H) 21.2 - 34.1 sec    aPTT, therapeutic range   82 - 892 sec   METABOLIC PANEL, BASIC    Collection Time: 10/19/22  7:30 AM   Result Value Ref Range    Sodium 142 136 - 145 mmol/L    Potassium 3.9 3.5 - 5.1 mmol/L    Chloride 107 97 - 108 mmol/L    CO2 28 21 - 32 mmol/L    Anion gap 7 5 - 15 mmol/L    Glucose 289 (H) 65 - 100 mg/dL    BUN 14 6 - 20 mg/dL    Creatinine 1.06 0.70 - 1.30 mg/dL    BUN/Creatinine ratio 13 12 - 20      eGFR >60 >60 ml/min/1.73m2    Calcium 9.2 8.5 - 10.1 mg/dL   CBC W/O DIFF    Collection Time: 10/19/22  7:30 AM   Result Value Ref Range    WBC 8.3 4.1 - 11.1 K/uL    RBC 4.24 4.10 - 5.70 M/uL    HGB 12.5 12.1 - 17.0 g/dL    HCT 36.4 (L) 36.6 - 50.3 %    MCV 85.8 80.0 - 99.0 FL    MCH 29.5 26.0 - 34.0 PG    MCHC 34.3 30.0 - 36.5 g/dL    RDW 11.4 (L) 11.5 - 14.5 %    PLATELET 609 686 - 734 K/uL    MPV 11.2 8.9 - 12.9 FL    NRBC 0.0 0.0  WBC    ABSOLUTE NRBC 0.00 0.00 - 0.01 K/uL   TROPONIN-HIGH SENSITIVITY    Collection Time: 10/19/22  7:30 AM   Result Value Ref Range    Troponin-High Sensitivity 36 0 - 76 ng/L   GLUCOSE, POC    Collection Time: 10/19/22  8:23 AM   Result Value Ref Range    Glucose (POC) 297 (H) 65 - 100 mg/dL    Performed by No Zhao    PTT    Collection Time: 10/19/22  8:53 AM   Result Value Ref Range    aPTT 61.8 (H) 21.2 - 34.1 sec    aPTT, therapeutic range   82 - 109 sec   GLUCOSE, POC    Collection Time: 10/19/22 10:46 AM   Result Value Ref Range    Glucose (POC) 256 (H) 65 - 100 mg/dL    Performed by No Zhao            Assessment/     Unstable angina    -Continue heparin GTT    -Cardiac catheterization scheduled for 4:30 PM    -Follow-up 2D echocardiogram    Type 2 diabetes    -Blood sugar in the 200 range    -We will hold insulin for now given n.p.o. status    -Restart insulin once patient begins oral diet    Hyperlipidemia    -Continue on oral statin    Obesity    -Counseled on dieting and exercise      Plan:  Continue supportive care  Follow-up cardiac catheterization findings      Care Plan discussed with: Patient/Family    Total time spent with patient: 30 minutes.     Ryann Cyr MD

## 2022-10-19 NOTE — ROUTINE PROCESS
Pt. Transferred to room 464w via bed in stable condition. Bedside report given, SBAR reviewed, sites viewed. Informed Dr. Briana Gates wants Heparin restarted per wt. Based protocol one hour after vasc. Band removal if site looks okay.

## 2022-10-19 NOTE — PROGRESS NOTES
Dual skin assessment performed with Calixto Domínguez RN. No open wounds noted. Skin is clean, dry, and intact.

## 2022-10-19 NOTE — ROUTINE PROCESS
Notified Dr. Russell Mccormack of the patients current blood sugar 256 and that the patients scheduled cath is scheduled for 1630. He stated to continue to hold the humalog sliding scale at this time. No new orders.      Carli Galvan RN

## 2022-10-19 NOTE — H&P
History and Physical    Patient: Louann Conway MRN: [id number]  SSN: [id number]    YOB: 1965  Age: 60 y.o. Sex: male      Subjective:      Louann Conway is a 60 y.o. male with PMH of hypertension, diabetes, hyperlipidemia and other medical problems as below. He presented to the ED with chief complaint of chest pain. Patient reports over the last 2 weeks the patient has been having exertional chest pain. He was following with his cardiologist today over at [facility] where he was receiving a stress test and began to have chest pain. Chest pain is squeezing in nature, rated 8/10 at maximum. Substernal, nonradiating. Worsening with exertion. Otherwise denies dizziness, diaphoresis, nausea or vomiting. In ED, troponin negative, EKG without SANTY. Heparin drip started.     Past Medical History:   Diagnosis Date    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    Atherosclerosis of arteries of extremities ([address] 7/15/2020    Cataract 7/15/2020    Coronary arteriosclerosis in native artery 7/15/2020    Diabetes mellitus type 2, controlled ([address] 7/15/2020    Diabetic mononeuropathy associated with type 2 diabetes mellitus ([address] 7/15/2020    Diabetic peripheral neuropathy ([address] 7/15/2020    EKG, abnormal 7/15/2020    Elevated liver enzymes 7/15/2020    Erectile dysfunction 7/15/2020    Essential hypertension 7/15/2020    Hepatitis     High cholesterol     Hyperlipidemia 7/15/2020    Hypertension     Pain in both lower legs 7/15/2020    Peripheral arterial occlusive disease ([address] 7/15/2020    Peripheral circulatory disorder associated with type 2 diabetes mellitus ([address] 7/15/2020    Spasm of back muscles 7/15/2020    Ulcer of foot ([address] 7/15/2020     Past Surgical History:   Procedure Laterality Date    HX CATARACT REMOVAL Bilateral     HX ORTHOPAEDIC Right     stents placed    HX ORTHOPAEDIC Left     HX WISDOM TEETH EXTRACTION      IR STENT PLACEMENT      Right Leg      Family History   Problem Relation Age of Onset    Diabetes Mother     Heart Disease Mother     Diabetes Father     Diabetes Sister     Diabetes Brother      Social History     Tobacco Use    Smoking status: Never    Smokeless tobacco: Never   Substance Use Topics    Alcohol use: Not Currently      Prior to Admission medications    Medication Sig Start Date End Date Taking? Authorizing Provider   glucose blood VI test strips (Prodigy No Coding) strip TEST  GLUCOSE 4 times DAILY 9/2/22   Eddie Harrison MD   NovoLOG Flexpen U-100 Insulin 100 unit/mL (3 mL) inpn INJECT 2 UNITS PER 50u above 150mg/dl 3 times daily before meals as needed according to sliding scale. max of 36 units daily  Indications: type 2 diabetes mellitus 6/18/22   Shayy Weston MD   hydroCHLOROthiazide (HYDRODIURIL) 25 mg tablet Take 1 Tablet by mouth daily. 6/18/22   Shayy Weston MD   atorvastatin (LIPITOR) 40 mg tablet Take 1 Tablet by mouth daily. 6/6/22   Stephania Bush MD   liraglutide (Victoza 3-Alfredo) 0.6 mg/0.1 mL (18 mg/3 mL) pnij 1.8 mg by SubCUTAneous route daily for 270 days. 5/12/22 2/6/23  Stephania Bush MD   lidocaine (LIDODERM) 5 % APPLY 1 PATCH TO THE AFFECTED AREA FOR 12 HOURS (ON FOR 12 HOURS THEN OFF FOR 12 HOURS) 2/2/22   Shayy Weston MD   losartan (COZAAR) 100 mg tablet Take 1 Tablet by mouth daily. Indications: high blood pressure 1/22/22   Nohemy Clay MD   aspirin 81 mg chewable tablet Take 1 Tablet by mouth daily. 1/22/22   Nohemy Clay MD   glucose 4 gram chewable tablet Take 4 Tablets by mouth as needed for PRN Reason (Other) (hypoglycemia.). 1/21/22   Nohemy Clay MD   escitalopram oxalate (LEXAPRO) 10 mg tablet Take 1 Tablet by mouth daily. 1/22/22   Nohemy Clay MD   lancets (Prodigy Twist Top Lancet) 28 gauge misc by Does Not Apply route Before breakfast, lunch, dinner and at bedtime.  1/21/22   Nohemy Clay MD   Insulin Syringe-Needle U-100 0.3 mL 30 gauge x 5/16\" syrg Use to inject novolin in sliding scale as indicated 3 times per day before meals 1/21/22   Mikey Cavanaugh MD   alcohol swabs (Alcohol Pads) padm Use to check glucose 3 times a day before meals 1/21/22   Mikey Cavanaugh MD   labetaloL (NORMODYNE) 100 mg tablet TAKE ONE TABLET BY MOUTH TWICE DAILY FOR 90 DAYS 1/21/22   Mikey Cavanaugh MD   clopidogreL (PLAVIX) 75 mg tab Take 1 Tablet by mouth daily. 1/21/22   Mikey Cavanaugh MD   insulin glargine (LANTUS) 100 unit/mL injection 60 units at bed time 1/21/22   Mikey Cavanaugh MD   traZODone (DESYREL) 50 mg tablet Take 1 Tablet by mouth nightly as needed for Sleep. Indications: major depressive disorder 1/21/22   Mikey Cavanaugh MD        No Known Allergies    Review of Systems:   Constitutional: No fevers, No chills, No fatigue, No weakness  Eyes: No visual disturbance  Ears, Nose, Mouth, Throat, and Face: No nasal congestion, No sore throat  Respiratory: No cough, No sputum, No wheezing, No SOB  Cardiovascular: See HPI  Gastrointestinal: No nausea, No vomiting, No diarrhea, No constipation, No abdominal pain  Genitourinary: No frequency, No dysuria, No hematuria  Integument/Breast: No rash, No skin lesion(s), No dryness  Musculoskeletal: No arthralgias, No neck pain, No back pain  Neurological: No headaches, No dizziness, No confusion,  No seizures  Behavioral/Psychiatric: No anxiety, No depression      Objective:     Vitals:    10/18/22 1840 10/18/22 1844 10/18/22 2233 10/19/22 0000   BP:   127/72    Pulse:  71 80 85   Resp:  24 24    Temp:   97.8 °F (36.6 °C)    SpO2: 98% 99% 94%    Weight:       Height:            Physical Exam:   General: alert, cooperative, no distress  Eye: conjunctivae/corneas clear. PERRL, EOM's intact. Throat and Neck: normal and no erythema or exudates noted. No mass   Lung: clear to auscultation bilaterally  Heart: regular rate and rhythm,   Abdomen: soft, non-tender.  Bowel sounds normal. No masses,  Extremities:  able to move all extremities normal, atraumatic  Skin: Normal.  Neurologic: AOx3. Motor function and sensation grossly intact. Psychiatric: non focal    Recent Results (from the past 24 hour(s))   TROPONIN-HIGH SENSITIVITY    Collection Time: 10/18/22  6:40 PM   Result Value Ref Range    Troponin-High Sensitivity 41 0 - 76 ng/L   CBC WITH AUTOMATED DIFF    Collection Time: 10/18/22  6:44 PM   Result Value Ref Range    WBC 4.3 4.1 - 11.1 K/uL    RBC 4.47 4.10 - 5.70 M/uL    HGB 13.0 12.1 - 17.0 g/dL    HCT 38.5 36.6 - 50.3 %    MCV 86.1 80.0 - 99.0 FL    MCH 29.1 26.0 - 34.0 PG    MCHC 33.8 30.0 - 36.5 g/dL    RDW 11.6 11.5 - 14.5 %    PLATELET 134 010 - 156 K/uL    MPV 11.2 8.9 - 12.9 FL    NRBC 0.0 0.0  WBC    ABSOLUTE NRBC 0.00 0.00 - 0.01 K/uL    NEUTROPHILS 77 (H) 32 - 75 %    LYMPHOCYTES 18 12 - 49 %    MONOCYTES 4 (L) 5 - 13 %    EOSINOPHILS 0 0 - 7 %    BASOPHILS 1 0 - 1 %    IMMATURE GRANULOCYTES 0 0 - 0.5 %    ABS. NEUTROPHILS 3.4 1.8 - 8.0 K/UL    ABS. LYMPHOCYTES 0.8 0.8 - 3.5 K/UL    ABS. MONOCYTES 0.2 0.0 - 1.0 K/UL    ABS. EOSINOPHILS 0.0 0.0 - 0.4 K/UL    ABS. BASOPHILS 0.0 0.0 - 0.1 K/UL    ABS. IMM. GRANS. 0.0 0.00 - 0.04 K/UL    DF AUTOMATED     METABOLIC PANEL, COMPREHENSIVE    Collection Time: 10/18/22  6:44 PM   Result Value Ref Range    Sodium 140 136 - 145 mmol/L    Potassium 4.1 3.5 - 5.1 mmol/L    Chloride 107 97 - 108 mmol/L    CO2 30 21 - 32 mmol/L    Anion gap 3 (L) 5 - 15 mmol/L    Glucose 299 (H) 65 - 100 mg/dL    BUN 14 6 - 20 mg/dL    Creatinine 1.16 0.70 - 1.30 mg/dL    BUN/Creatinine ratio 12 12 - 20      eGFR >60 >60 ml/min/1.73m2    Calcium 9.3 8.5 - 10.1 mg/dL    Bilirubin, total 0.3 0.2 - 1.0 mg/dL    AST (SGOT) 24 15 - 37 U/L    ALT (SGPT) 50 12 - 78 U/L    Alk.  phosphatase 67 45 - 117 U/L    Protein, total 6.8 6.4 - 8.2 g/dL    Albumin 3.6 3.5 - 5.0 g/dL    Globulin 3.2 2.0 - 4.0 g/dL    A-G Ratio 1.1 1.1 - 2.2     PROTHROMBIN TIME + INR Collection Time: 10/18/22  6:44 PM   Result Value Ref Range    Prothrombin time 13.1 11.9 - 14.6 sec    INR 1.0 0.9 - 1.1     PTT    Collection Time: 10/18/22  6:44 PM   Result Value Ref Range    aPTT 26.8 21.2 - 34.1 sec    aPTT, therapeutic range   82 - 109 sec   GLUCOSE, POC    Collection Time: 10/18/22 10:56 PM   Result Value Ref Range    Glucose (POC) 298 (H) 65 - 100 mg/dL    Performed by Edgardo Louise    PTT    Collection Time: 10/19/22  1:19 AM   Result Value Ref Range    aPTT 61.3 (H) 21.2 - 34.1 sec    aPTT, therapeutic range   82 - 109 sec       XR Results (maximum last 3): Results from [place] encounter on 10/18/22    XR CHEST PORT    Narrative  EXAM:  XR CHEST PORT    INDICATION: Chest pain    COMPARISON: 9/8/2014    TECHNIQUE: Upright portable chest AP view    FINDINGS: Cardiac monitor leads The cardiac silhouette is within normal limits. The pulmonary vasculature is within normal limits. The lungs and pleural spaces are clear. The visualized bones and upper abdomen  are age-appropriate. Impression  No acute process on portable chest.      Results from Hospital Encounter encounter on 09/20/22    XR WRIST RT AP/LAT/OBL MIN 3V    Narrative  EXAM: XR WRIST RT AP/LAT/OBL MIN 3V    INDICATION: Wrist pain. COMPARISON: None. FINDINGS: Three  views of the right wrist demonstrate degenerative changes at  the radiocarpal joint with sclerosis of the distal radius and the scaphoid. No  acute fracture or dislocation. Soft tissues are within normal limits. Impression  Radiocarpal joint degenerative changes. .      CT Results (maximum last 3): No results found for this or any previous visit. MRI Results (maximum last 3): No results found for this or any previous visit. Nuclear Medicine Results (maximum last 3): No results found for this or any previous visit. US Results (maximum last 3): No results found for this or any previous visit.     Pt's ALVARO Score = 3    ALVARO Score Calculation (1 point for each):  - Age >= 72  - Aspirin use in the last 7 days   - At least 2 angina episodes within the last 24hrs  - ST changes of at least 0.5mm on admission EKG  - Elevated serum cardiac biomarkers  - Known Coronary Artery Disease (CAD) (coronary stenosis >= 50%)  - At least 3 risk factors for CAD:  Hypertension -> 140/90 or on antihypertensives, Cigarette smoking, HDL < 40, Diabetes, Family history of premature CAD (CAD in male first-degree relative, or father less than 54, or female first-degree relative or mother less than 72). Score Interpretation:  % risk at 14 days of: all-cause mortality, new or recurrent MI, or severe recurrent ischemia requiring urgent revascularization. Score of 0-1 = 4.7% risk  Score of 2 = 8.3% risk  Score of 3 = 13.2% risk  Score of 4 = 19.9% risk  Score of 5 = 26.2% risk  Score of 6-7 = at least 40.9% risk    Two or More Episodes of Severe Angina within 24 Hours: 1  Elevated Cardiac Markers: 0  ST Changes > 0.5 mm: 0  Aspirin Use in the Past 7 Days: 1  Age 65+: 0  3+ CAD Risk Factors: 1  CAD Stenosis >= 50%: 0  ALVARO Risk Score (Calculated): 3         Assessment and plan:   # Angina  - Start heparin ggt  - Trend troponin   - SL Nitro for chest pain as needed. - Consult cardiology   - ECHO ordered  - GDMT per cardiology. # Right leg stent (PAD?)  - Continue DAPT. # Diabetes  - Decrease home dose Lantus to 50 units.  - POC + correctional insulin     # Hypertension  - Continue home medications. # HLP  - HI statin. # Full code by default, need further clarification    # Medication list reviewed on Epic and/or outside documentation. Not reviewed with patient.         Signed By: Jorge Leonard MD     October 19, 2022

## 2022-10-19 NOTE — PROGRESS NOTES
Reason for Admission:  chest pain                   7%  RUR Score:                     Plan for utilizing home health:     No need for [facility]     PCP: First and Last name:  Sherman Becerril MD   [facility] of Practice[facility]   Are you a current patient: Yes/No: yes   Approximate date of last visit: 2 months ago   Can you participate in a virtual visit with your PCP:                     Current Advanced Directive/Advance Care Plan: Full Code      Healthcare Decision Maker:   Click here to complete [address] including selection of the Healthcare Decision Maker Relationship (ie \"Primary\")                             Transition of Care Plan:                    Patient lives in a one story home with his wife. Home has 3 steps to the entrance. Patient has a glucometer and BP cuff, but no other DME. Patient has no HH, SNF or IRF services. Patient uses [facility] in [place] for his prescriptions. Patient works part time and drives himself to all of his appointments. Wife will transport patient home.

## 2022-10-19 NOTE — PROGRESS NOTES
Progress Note  Date:10/19/2022       Room:[facility]  Patient Shelly Castaneda     YOB: 1965     Age:56 y.o. Subjective    Subjective Charitochaparrita Llanes is a 60 y.o. male with PMH of hypertension, diabetes, hyperlipidemia. He presented to the ED with chief complaint of chest pain. Patient reports over the last 2 weeks the patient has been having exertional chest pain. He was following with his cardiologist today over at [facility] cardiology where he was receiving a stress test and began to have chest pain. Chest pain is squeezing in nature, rated 8/10 at maximum. Substernal, nonradiating. Worsening with exertion. Otherwise denies dizziness, diaphoresis, nausea or vomiting. In ED, troponin negative, EKG without SANTY. Heparin drip started. Seen on follow up. Patient is sitting in bed on room air. He has not had any pain or shortness of breath and denies any other symptoms. Review of Systems Otherwise normal.  Objective         Visit Vitals  BP (!) 147/85 (BP 1 Location: Left upper arm, BP Patient Position: Supine)   Pulse 63   Temp 97.6 °F (36.4 °C)   Resp 22   Ht 5' 9\" (1.753 m)   Wt 240 lb (108.9 kg)   SpO2 98%   BMI 35.44 kg/m²     General:  Alert, cooperative, no distress. Head:  Normocephalic, without obvious abnormality, atraumatic. Eyes:  Conjunctivae/corneas clear. Pupils equal, round, reactive to light. Extraocular movements intact. Lungs:   Clear to auscultation bilaterally. Chest wall:  No tenderness or deformity. Heart:  Regular rate and rhythm, S1, S2 normal, no murmur, click, rub, or gallop. Abdomen:   Soft, non-tender. Bowel sounds normal. No masses. No organomegaly. Extremities: Extremities normal, atraumatic, no cyanosis or edema. Pulses: 2+ and symmetric all extremities. Skin: Skin color, texture, turgor normal. No rashes or lesions. Lymph nodes: Cervical, supraclavicular, and axillary nodes normal.   Neurologic: CNII-XII intact.  Normal strength, sensation, and reflexes throughout. I/O (24Hr): Intake/Output Summary (Last 24 hours) at 10/19/2022 0954  Last data filed at 10/19/2022 0817  Gross per 24 hour   Intake --   Output 750 ml   Net -750 ml     Objective:  Vital signs: (most recent): Blood pressure (!) 147/85, pulse 63, temperature 97.6 °F (36.4 °C), resp. rate 22, height 5' 9\" (1.753 m), weight 240 lb (108.9 kg), SpO2 98 %. Labs/Imaging/Diagnostics    Labs:  CBC:  Recent Labs     10/19/22  0730 10/18/22  1844   WBC 8.3 4.3   RBC 4.24 4.47   HGB 12.5 13.0   HCT 36.4* 38.5   MCV 85.8 86.1   RDW 11.4* 11.6    311     CHEMISTRIES:  Recent Labs     10/19/22  0730 10/18/22  1844    140   K 3.9 4.1    107   CO2 28 30   BUN 14 14   CREA 1.06 1.16   CA 9.2 9.3   PT/INR:  Recent Labs     10/18/22  1844   INR 1.0     APTT:  Recent Labs     10/19/22  0853 10/19/22  0119 10/18/22  1844   APTT 61.8* 61.3* 26.8     LIVER PROFILE:  Recent Labs     10/18/22  1844   AST 24   ALT 50     Lab Results   Component Value Date/Time    ALT (SGPT) 50 10/18/2022 06:44 PM    AST (SGOT) 24 10/18/2022 06:44 PM    Alk. phosphatase 67 10/18/2022 06:44 PM    Bilirubin, total 0.3 10/18/2022 06:44 PM       Imaging Last 24 Hours:  XR CHEST PORT    Result Date: 10/18/2022  EXAM:  XR CHEST PORT INDICATION: Chest pain COMPARISON: 9/8/2014 TECHNIQUE: Upright portable chest AP view FINDINGS: Cardiac monitor leads The cardiac silhouette is within normal limits. The pulmonary vasculature is within normal limits. The lungs and pleural spaces are clear. The visualized bones and upper abdomen are age-appropriate.      No acute process on portable chest.    Assessment//Plan           Patient Active Problem List    Diagnosis Date Noted    Chest pain 10/18/2022    MDD (major depressive disorder) 01/16/2022    Suicidal ideation 01/16/2022    Cocaine abuse ([facility] 01/16/2022    Prostate cancer screening 08/20/2021    Low testosterone 08/20/2021    Fissure in skin 05/27/2021    Severe obesity ([facility] 03/22/2021    Acquired left foot drop 03/22/2021    Mononeuropathy 11/27/2020    Peripheral circulatory disorder due to type 2 diabetes mellitus ([address] 11/27/2020    Onychomycosis 08/11/2020    Plantar fasciitis of right foot 08/11/2020    Severe obesity (BMI 35.0-35.9 with comorbidity) ([address] 07/27/2020    Cataract 07/15/2020    Diabetic peripheral neuropathy ([facility] [address] 07/15/2020    Abnormal Q waves on electrocardiogram 07/15/2020    Elevated liver enzymes 07/15/2020    Essential hypertension 07/15/2020    Hyperlipidemia 07/15/2020    Diabetic mononeuropathy associated with type 2 diabetes mellitus ([address] 07/15/2020    Pain in both lower legs 07/15/2020    Peripheral arterial occlusive disease ([address] 07/15/2020    Peripheral circulatory disorder associated with type 2 diabetes mellitus ([facility] 07/15/2020    ED (erectile dysfunction) 07/15/2020    Spasm of back muscles 07/15/2020    Diabetes mellitus type 2, controlled ([address] 07/15/2020    Arteriosclerotic gangrene ([address] 07/15/2020    Atherosclerosis of arteries of extremities ([address] 07/15/2020    Coronary arteriosclerosis in native artery 07/15/2020    Ulcer of foot ([facility] [address] 07/15/2020     Assessment & Plan  # Angina  - Start heparin ggt  - Trend troponin 41-> 36  - SL Nitro for chest pain as needed. - Consult cardiology   - ECHO ordered  - GDMT per cardiology. # Right leg stent (PAD?)  - Continue DAPT. # Diabetes  -Continue home medications     # Hypertension  - Continue home medications. # HLP  - HI statin. # Full code by default, need further clarification     # Medication list reviewed on Epic and/or outside documentation. Not reviewed with patient. Electronically signed by Atiya Fontenot on 10/19/2022 at 9:54 AM    *ATTENTION:  This note has been created by a medical student for educational purposes only. Please do not refer to the content of this note for clinical decision-making, billing, or other purposes.   Please see attending physicians note to obtain clinical information on this patient. *

## 2022-10-19 NOTE — PROGRESS NOTES
Problem: Falls - Risk of  Goal: *Absence of Falls  Description: Document Clearence Skates Fall Risk and appropriate interventions in the flowsheet.   Outcome: Progressing Towards Goal  Note: Fall Risk Interventions:     Medication Interventions: Bed/chair exit alarm, Patient to call before getting OOB, Teach patient to arise slowly    Problem: Patient Education: Go to Patient Education Activity  Goal: Patient/Family Education  Outcome: Progressing Towards Goal

## 2022-10-20 VITALS
WEIGHT: 240 LBS | OXYGEN SATURATION: 98 % | RESPIRATION RATE: 16 BRPM | DIASTOLIC BLOOD PRESSURE: 71 MMHG | TEMPERATURE: 97.7 F | SYSTOLIC BLOOD PRESSURE: 119 MMHG | HEART RATE: 64 BPM | HEIGHT: 69 IN | BODY MASS INDEX: 35.55 KG/M2

## 2022-10-20 LAB
APTT PPP: 117.7 SEC (ref 21.2–34.1)
APTT PPP: 69.2 SEC (ref 21.2–34.1)
GLUCOSE BLD STRIP.AUTO-MCNC: 175 MG/DL (ref 65–100)
GLUCOSE BLD STRIP.AUTO-MCNC: 204 MG/DL (ref 65–100)
GLUCOSE BLD STRIP.AUTO-MCNC: 273 MG/DL (ref 65–100)
GLUCOSE BLD STRIP.AUTO-MCNC: 294 MG/DL (ref 65–100)
PERFORMED BY, TECHID: ABNORMAL
THERAPEUTIC RANGE,PTTT: ABNORMAL SEC (ref 82–109)
THERAPEUTIC RANGE,PTTT: ABNORMAL SEC (ref 82–109)

## 2022-10-20 PROCEDURE — 74011250637 HC RX REV CODE- 250/637: Performed by: INTERNAL MEDICINE

## 2022-10-20 PROCEDURE — 36415 COLL VENOUS BLD VENIPUNCTURE: CPT

## 2022-10-20 PROCEDURE — 74011250636 HC RX REV CODE- 250/636: Performed by: INTERNAL MEDICINE

## 2022-10-20 PROCEDURE — 74011000250 HC RX REV CODE- 250: Performed by: INTERNAL MEDICINE

## 2022-10-20 PROCEDURE — 74011636637 HC RX REV CODE- 636/637: Performed by: INTERNAL MEDICINE

## 2022-10-20 PROCEDURE — 82962 GLUCOSE BLOOD TEST: CPT

## 2022-10-20 PROCEDURE — 85730 THROMBOPLASTIN TIME PARTIAL: CPT

## 2022-10-20 PROCEDURE — 74011000250 HC RX REV CODE- 250: Performed by: STUDENT IN AN ORGANIZED HEALTH CARE EDUCATION/TRAINING PROGRAM

## 2022-10-20 PROCEDURE — 65270000032 HC RM SEMIPRIVATE

## 2022-10-20 RX ORDER — INSULIN GLARGINE 100 [IU]/ML
55 INJECTION, SOLUTION SUBCUTANEOUS
Status: DISCONTINUED | OUTPATIENT
Start: 2022-10-20 | End: 2022-10-20

## 2022-10-20 RX ORDER — HYDRALAZINE HYDROCHLORIDE 25 MG/1
25 TABLET, FILM COATED ORAL 3 TIMES DAILY
Status: DISCONTINUED | OUTPATIENT
Start: 2022-10-20 | End: 2022-10-21 | Stop reason: HOSPADM

## 2022-10-20 RX ORDER — INSULIN GLARGINE 100 [IU]/ML
60 INJECTION, SOLUTION SUBCUTANEOUS
Status: DISCONTINUED | OUTPATIENT
Start: 2022-10-20 | End: 2022-10-21 | Stop reason: HOSPADM

## 2022-10-20 RX ORDER — INSULIN LISPRO 100 [IU]/ML
10 INJECTION, SOLUTION INTRAVENOUS; SUBCUTANEOUS
Status: DISCONTINUED | OUTPATIENT
Start: 2022-10-20 | End: 2022-10-21 | Stop reason: HOSPADM

## 2022-10-20 RX ORDER — LABETALOL 100 MG/1
100 TABLET, FILM COATED ORAL 3 TIMES DAILY
Status: DISCONTINUED | OUTPATIENT
Start: 2022-10-20 | End: 2022-10-21 | Stop reason: HOSPADM

## 2022-10-20 RX ADMIN — LABETALOL HYDROCHLORIDE 100 MG: 100 TABLET, FILM COATED ORAL at 21:01

## 2022-10-20 RX ADMIN — INSULIN LISPRO 3 UNITS: 100 INJECTION, SOLUTION INTRAVENOUS; SUBCUTANEOUS at 21:00

## 2022-10-20 RX ADMIN — SODIUM CHLORIDE, PRESERVATIVE FREE 10 ML: 5 INJECTION INTRAVENOUS at 14:44

## 2022-10-20 RX ADMIN — LOSARTAN POTASSIUM 100 MG: 50 TABLET, FILM COATED ORAL at 08:00

## 2022-10-20 RX ADMIN — SODIUM CHLORIDE, PRESERVATIVE FREE 10 ML: 5 INJECTION INTRAVENOUS at 05:09

## 2022-10-20 RX ADMIN — ASPIRIN 81 MG 81 MG: 81 TABLET ORAL at 08:00

## 2022-10-20 RX ADMIN — HEPARIN SODIUM 2000 UNITS: 1000 INJECTION, SOLUTION INTRAVENOUS; SUBCUTANEOUS at 08:26

## 2022-10-20 RX ADMIN — HEPARIN SODIUM AND DEXTROSE 16 UNITS/KG/HR: 10000; 5 INJECTION INTRAVENOUS at 18:52

## 2022-10-20 RX ADMIN — SODIUM CHLORIDE, PRESERVATIVE FREE 10 ML: 5 INJECTION INTRAVENOUS at 21:04

## 2022-10-20 RX ADMIN — HYDRALAZINE HYDROCHLORIDE 25 MG: 25 TABLET, FILM COATED ORAL at 21:01

## 2022-10-20 RX ADMIN — INSULIN GLARGINE 60 UNITS: 100 INJECTION, SOLUTION SUBCUTANEOUS at 21:01

## 2022-10-20 RX ADMIN — HEPARIN SODIUM AND DEXTROSE 18 UNITS/KG/HR: 10000; 5 INJECTION INTRAVENOUS at 13:22

## 2022-10-20 RX ADMIN — LABETALOL HYDROCHLORIDE 100 MG: 100 TABLET, FILM COATED ORAL at 08:00

## 2022-10-20 RX ADMIN — INSULIN LISPRO 10 UNITS: 100 INJECTION, SOLUTION INTRAVENOUS; SUBCUTANEOUS at 17:32

## 2022-10-20 RX ADMIN — SODIUM CHLORIDE, PRESERVATIVE FREE 10 ML: 5 INJECTION INTRAVENOUS at 14:43

## 2022-10-20 RX ADMIN — LABETALOL HYDROCHLORIDE 100 MG: 100 TABLET, FILM COATED ORAL at 17:33

## 2022-10-20 RX ADMIN — INSULIN LISPRO 3 UNITS: 100 INJECTION, SOLUTION INTRAVENOUS; SUBCUTANEOUS at 07:59

## 2022-10-20 RX ADMIN — SODIUM CHLORIDE, PRESERVATIVE FREE 10 ML: 5 INJECTION INTRAVENOUS at 05:10

## 2022-10-20 RX ADMIN — ATORVASTATIN CALCIUM 40 MG: 40 TABLET, FILM COATED ORAL at 08:00

## 2022-10-20 RX ADMIN — HYDRALAZINE HYDROCHLORIDE 25 MG: 25 TABLET, FILM COATED ORAL at 17:33

## 2022-10-20 RX ADMIN — INSULIN LISPRO 5 UNITS: 100 INJECTION, SOLUTION INTRAVENOUS; SUBCUTANEOUS at 12:17

## 2022-10-20 NOTE — ROUTINE PROCESS
The writer was notified by the tech that the patient was taking his own insulin. The writer entered the room and asked the patient what he was doing, he stated he took his own insulin, 10u of novolog from home because he did not feel that his blood sugar was being controlled well. The writer educated the patient on the importance of not taking home medications for safety and to notify the nurse if he felt something was not controlled and the writer would notify the doctor. Notified Dr. Ana Samaniego. Dr. Ana Samaniego in to see the patient to discuss medications. Notified charge nurse of incident.      Leila Sun RN

## 2022-10-20 NOTE — PROGRESS NOTES
Problem: Falls - Risk of  Goal: *Absence of Falls  Description: Document Jayden Ibanez Fall Risk and appropriate interventions in the flowsheet. Outcome: Progressing Towards Goal  Note: Fall Risk Interventions:            Medication Interventions: Assess postural VS orthostatic hypotension                   Problem: Patient Education: Go to Patient Education Activity  Goal: Patient/Family Education  Outcome: Progressing Towards Goal     Problem: Diabetes Self-Management  Goal: *Disease process and treatment process  Description: Define diabetes and identify own type of diabetes; list 3 options for treating diabetes. Outcome: Progressing Towards Goal  Goal: *Incorporating nutritional management into lifestyle  Description: Describe effect of type, amount and timing of food on blood glucose; list 3 methods for planning meals. Outcome: Progressing Towards Goal  Goal: *Incorporating physical activity into lifestyle  Description: State effect of exercise on blood glucose levels. Outcome: Progressing Towards Goal  Goal: *Developing strategies to promote health/change behavior  Description: Define the ABC's of diabetes; identify appropriate screenings, schedule and personal plan for screenings. Outcome: Progressing Towards Goal  Goal: *Using medications safely  Description: State effect of diabetes medications on diabetes; name diabetes medication taking, action and side effects. Outcome: Progressing Towards Goal  Goal: *Monitoring blood glucose, interpreting and using results  Description: Identify recommended blood glucose targets  and personal targets. Outcome: Progressing Towards Goal  Goal: *Prevention, detection, treatment of acute complications  Description: List symptoms of hyper- and hypoglycemia; describe how to treat low blood sugar and actions for lowering  high blood glucose level.   Outcome: Progressing Towards Goal  Goal: *Prevention, detection and treatment of chronic complications  Description: Define the natural course of diabetes and describe the relationship of blood glucose levels to long term complications of diabetes.   Outcome: Progressing Towards Goal  Goal: *Developing strategies to address psychosocial issues  Description: Describe feelings about living with diabetes; identify support needed and support network  Outcome: Progressing Towards Goal  Goal: *Insulin pump training  Outcome: Progressing Towards Goal  Goal: *Sick day guidelines  Outcome: Progressing Towards Goal  Goal: *Patient Specific Goal (EDIT GOAL, INSERT TEXT)  Outcome: Progressing Towards Goal     Problem: Patient Education: Go to Patient Education Activity  Goal: Patient/Family Education  Outcome: Progressing Towards Goal

## 2022-10-20 NOTE — ROUTINE PROCESS
Bedside and Verbal shift change report given to JUAN Winters (oncoming nurse) by Maliha Leary RN (offgoing nurse). Report included the following information Kardex and MAR.

## 2022-10-20 NOTE — ROUTINE PROCESS
Pt brought to the floor by [facility], RN. The writer was notified that the nurse was unable to palpate ulnar/radial pulses and that Dr. Rosana Gillette was aware. The writer was unable to palpate pulses as well. Notified nursing supervisor,Lindsey She stated it was okay to accept the pt since Dr. Rosana Gillette was aware.      Callum Marmolejo RN

## 2022-10-20 NOTE — PROGRESS NOTES
Problem: Falls - Risk of  Goal: *Absence of Falls  Description: Document Carmen Shay Fall Risk and appropriate interventions in the flowsheet. Outcome: Progressing Towards Goal  Note: Fall Risk Interventions:            Medication Interventions: Patient to call before getting OOB         History of Falls Interventions: Bed/chair exit alarm         Problem: Patient Education: Go to Patient Education Activity  Goal: Patient/Family Education  Outcome: Progressing Towards Goal     Problem: Diabetes Self-Management  Goal: *Disease process and treatment process  Description: Define diabetes and identify own type of diabetes; list 3 options for treating diabetes. Outcome: Progressing Towards Goal  Goal: *Incorporating nutritional management into lifestyle  Description: Describe effect of type, amount and timing of food on blood glucose; list 3 methods for planning meals. Outcome: Progressing Towards Goal  Goal: *Incorporating physical activity into lifestyle  Description: State effect of exercise on blood glucose levels. Outcome: Progressing Towards Goal  Goal: *Developing strategies to promote health/change behavior  Description: Define the ABC's of diabetes; identify appropriate screenings, schedule and personal plan for screenings. Outcome: Progressing Towards Goal  Goal: *Using medications safely  Description: State effect of diabetes medications on diabetes; name diabetes medication taking, action and side effects. Outcome: Progressing Towards Goal  Goal: *Monitoring blood glucose, interpreting and using results  Description: Identify recommended blood glucose targets  and personal targets. Outcome: Progressing Towards Goal  Goal: *Prevention, detection, treatment of acute complications  Description: List symptoms of hyper- and hypoglycemia; describe how to treat low blood sugar and actions for lowering  high blood glucose level.   Outcome: Progressing Towards Goal  Goal: *Prevention, detection and treatment of chronic complications  Description: Define the natural course of diabetes and describe the relationship of blood glucose levels to long term complications of diabetes.   Outcome: Progressing Towards Goal  Goal: *Developing strategies to address psychosocial issues  Description: Describe feelings about living with diabetes; identify support needed and support network  Outcome: Progressing Towards Goal  Goal: *Insulin pump training  Outcome: Progressing Towards Goal  Goal: *Sick day guidelines  Outcome: Progressing Towards Goal  Goal: *Patient Specific Goal (EDIT GOAL, INSERT TEXT)  Outcome: Progressing Towards Goal     Problem: Patient Education: Go to Patient Education Activity  Goal: Patient/Family Education  Outcome: Progressing Towards Goal

## 2022-10-20 NOTE — ROUTINE PROCESS
Notified Dr. Patrice Ware of the pts current blood sugar 175 after the pt used his own novolog. He stated to administer the 10u scheduled humalog and hold the sliding scale at this time.     Jennifer Hill RN

## 2022-10-20 NOTE — PROGRESS NOTES
Cardiology Progress Note    REASON FOR CONSULT:  Chest pain    REQUESTING PROVIDER:  Dr. Castro Nan:  No chief complaint on file. HISTORY OF PRESENT ILLNESS:  Allen Dowell is a 60y.o. year-old male with past medical history significant for hypertension, diabetes, and prior stroke who was admitted to the hospital for further evaluation of chest pain. Patient was seen in the office on 10/18/22 for a treadmill stress test, after the test the patient developed chest pain which persisted for the rest of the day. Patient is awake and alert. Denies chest pain or shortness of breath at this time. Offers no other complaints. 10/20: Awake and alert. No active chest pain at this time. Awaiting transfer for CABG, pending bed availability. Telemetry Review: No acute events noted since admission. NSR; HR 63s, no ectopy.        PAST MEDICAL HISTORY:    Past Medical History:   Diagnosis Date    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    Atherosclerosis of arteries of extremities ([address] 7/15/2020    Cataract 7/15/2020    Coronary arteriosclerosis in native artery 7/15/2020    Diabetes mellitus type 2, controlled ([address] 7/15/2020    Diabetic mononeuropathy associated with type 2 diabetes mellitus ([address] 7/15/2020    Diabetic peripheral neuropathy ([address] 7/15/2020    EKG, abnormal 7/15/2020    Elevated liver enzymes 7/15/2020    Erectile dysfunction 7/15/2020    Essential hypertension 7/15/2020    Hepatitis     High cholesterol     Hyperlipidemia 7/15/2020    Hypertension     Pain in both lower legs 7/15/2020    Peripheral arterial occlusive disease ([address] 7/15/2020    Peripheral circulatory disorder associated with type 2 diabetes mellitus ([address] 7/15/2020    Spasm of back muscles 7/15/2020    Ulcer of foot ([address] 7/15/2020       PAST SURGICAL HISTORY:   Past Surgical History:   Procedure Laterality Date    HX CATARACT REMOVAL Bilateral     HX ORTHOPAEDIC Right     stents placed    HX ORTHOPAEDIC Left HX WISDOM TEETH EXTRACTION      IR STENT PLACEMENT      Right Leg       ALLERGIES:  No Known Allergies    FAMILY HISTORY:    Family History   Problem Relation Age of Onset    Diabetes Mother     Heart Disease Mother     Diabetes Father     Diabetes Sister     Diabetes Brother        SOCIAL HISTORY:    Tobacco: non-smoker  Drugs: not reviewed  ETOH: not reviewed    HOME MEDICATIONS:    Prior to Admission Medications   Prescriptions Last Dose Informant Patient Reported? Taking? Insulin Syringe-Needle U-100 0.3 mL 30 gauge x 5/16\" syrg   No No   Sig: Use to inject novolin in sliding scale as indicated 3 times per day before meals   NovoLOG Flexpen U-100 Insulin 100 unit/mL (3 mL) inpn   No No   Sig: INJECT 2 UNITS PER 50u above 150mg/dl 3 times daily before meals as needed according to sliding scale. max of 36 units daily  Indications: type 2 diabetes mellitus   alcohol swabs (Alcohol Pads) padm   No No   Sig: Use to check glucose 3 times a day before meals   aspirin 81 mg chewable tablet   No No   Sig: Take 1 Tablet by mouth daily. atorvastatin (LIPITOR) 40 mg tablet   No No   Sig: Take 1 Tablet by mouth daily. clopidogreL (PLAVIX) 75 mg tab   No No   Sig: Take 1 Tablet by mouth daily. escitalopram oxalate (LEXAPRO) 10 mg tablet   No No   Sig: Take 1 Tablet by mouth daily. glucose 4 gram chewable tablet   No No   Sig: Take 4 Tablets by mouth as needed for PRN Reason (Other) (hypoglycemia.). glucose blood VI test strips (Prodigy No Coding) strip   No No   Sig: TEST  GLUCOSE 4 times DAILY   hydroCHLOROthiazide (HYDRODIURIL) 25 mg tablet   No No   Sig: Take 1 Tablet by mouth daily. insulin glargine (LANTUS) 100 unit/mL injection   No No   Si units at bed time   labetaloL (NORMODYNE) 100 mg tablet   No No   Sig: TAKE ONE TABLET BY MOUTH TWICE DAILY FOR 90 DAYS   lancets (Prodigy Twist Top Lancet) 28 gauge misc   No No   Sig: by Does Not Apply route Before breakfast, lunch, dinner and at bedtime. lidocaine (LIDODERM) 5 %   No No   Sig: APPLY 1 PATCH TO THE AFFECTED AREA FOR 12 HOURS (ON FOR 12 HOURS THEN OFF FOR 12 HOURS)   liraglutide (Victoza 3-Alfredo) 0.6 mg/0.1 mL (18 mg/3 mL) pnij   No No   Si.8 mg by SubCUTAneous route daily for 270 days. losartan (COZAAR) 100 mg tablet   No No   Sig: Take 1 Tablet by mouth daily. Indications: high blood pressure   traZODone (DESYREL) 50 mg tablet   No No   Sig: Take 1 Tablet by mouth nightly as needed for Sleep. Indications: major depressive disorder      Facility-Administered Medications: None       REVIEW OF SYSTEMS:  Complete review of systems performed, pertinents noted above, all other systems are negative. Patient Vitals for the past 24 hrs:   Temp Pulse Resp BP SpO2   10/20/22 1114 98.1 °F (36.7 °C) 84 18 108/70 97 %   10/20/22 0730 97.8 °F (36.6 °C) 63 24 114/67 98 %   10/20/22 0340 97.9 °F (36.6 °C) 69 18 123/73 95 %   10/20/22 0000 -- 89 -- -- --   10/19/22 2337 98.1 °F (36.7 °C) 72 18 124/72 95 %   10/19/22 2000 -- 76 -- -- --   10/19/22 1939 98 °F (36.7 °C) 69 18 123/71 97 %   10/19/22 1756 97.9 °F (36.6 °C) 64 18 (!) 158/87 98 %   10/19/22 1730 -- 79 18 (!) 140/85 98 %   10/19/22 1720 -- (!) 57 14 123/75 98 %   10/19/22 1715 -- 63 14 122/78 99 %   10/19/22 1706 -- (!) 57 17 123/80 100 %   10/19/22 1555 97.9 °F (36.6 °C) (!) 59 22 130/81 99 %         PHYSICAL EXAMINATION:    General: NAD, A&O  HEENT: Normocephalic, PERRL, no drainage  Neck: Supple, Trachea midline, No JVD  RESP: CTA bilaterally. + Symmetrical chest movement. No SOB or distress. On RA  Cardiovascular: RRR no MRG  PVS: No rubor, cyanosis, no edema  ABD: soft, NT, Normoactive BS  Derm: Warm/Dry/Intact with no lesions  Neuro: A&O PPTS,  No focal deficits  PSYCH: No anxiety or agitation    Recent labs results and imaging reviewed.       Recent Results (from the past 24 hour(s))   PTT    Collection Time: 10/19/22  3:51 PM   Result Value Ref Range    aPTT 108.1 (H) 21.2 - 34.1 sec aPTT, therapeutic range   82 - 109 sec   GLUCOSE, POC    Collection Time: 10/19/22  5:10 PM   Result Value Ref Range    Glucose (POC) 172 (H) 65 - 100 mg/dL    Performed by Duglas Jimenez    GLUCOSE, POC    Collection Time: 10/19/22  8:55 PM   Result Value Ref Range    Glucose (POC) 343 (H) 65 - 100 mg/dL    Performed by Danny Standard    PTT    Collection Time: 10/20/22  7:28 AM   Result Value Ref Range    aPTT 69.2 (H) 21.2 - 34.1 sec    aPTT, therapeutic range   82 - 109 sec   GLUCOSE, POC    Collection Time: 10/20/22  7:39 AM   Result Value Ref Range    Glucose (POC) 204 (H) 65 - 100 mg/dL    Performed by Kamryn Benson    GLUCOSE, POC    Collection Time: 10/20/22 11:40 AM   Result Value Ref Range    Glucose (POC) 273 (H) 65 - 100 mg/dL    Performed by JEANINE NEWTON        XR Results (maximum last 3): Results from [place] encounter on 10/18/22    XR CHEST PORT    Impression  No acute process on portable chest.      Results from [place] encounter on 09/20/22    XR WRIST RT AP/LAT/OBL MIN 3V    Impression  Radiocarpal joint degenerative changes. .        Current Facility-Administered Medications:     sodium chloride (NS) flush 5-40 mL, 5-40 mL, IntraVENous, Q8H, Nancy Gongora MD, 10 mL at 10/20/22 0509    sodium chloride (NS) flush 5-40 mL, 5-40 mL, IntraVENous, PRN, Nancy Gongora MD    insulin lispro (HUMALOG) injection, , SubCUTAneous, AC&HS, TegegBlair moya MD, 3 Units at 10/20/22 0759    heparin 25,000 units in D5W 250 ml infusion, 12-25 Units/kg/hr, IntraVENous, TITRATE, [place], Jed Hassan MD, Last Rate: 19.6 mL/hr at 10/20/22 0822, 18 Units/kg/hr at 10/20/22 0822    heparin (porcine) 1,000 unit/mL injection 4,000 Units, 4,000 Units, IntraVENous, PRN **OR** heparin (porcine) 1,000 unit/mL injection 2,000 Units, 2,000 Units, IntraVENous, PRN, Flor De La O MD, 2,000 Units at 10/20/22 1346    aspirin chewable tablet 81 mg, 81 mg, Oral, DAILY, [place], Jed Hassan MD, 81 mg at 10/20/22 0800 atorvastatin (LIPITOR) tablet 40 mg, 40 mg, Oral, DAILY, Jenise Simon Che, MD, 40 mg at 10/20/22 0800    escitalopram oxalate (LEXAPRO) tablet 10 mg, 10 mg, Oral, DAILY, Chichi Prince MD, 10 mg at 10/19/22 0940    insulin glargine (LANTUS) injection 50 Units, 50 Units, SubCUTAneous, QHS, Jenise Simon Che, MD, 50 Units at 10/19/22 2112    glucose chewable tablet 16 g, 4 Tablet, Oral, PRN, Jenise Simon Che, MD    glucagon (GLUCAGEN) injection 1 mg, 1 mg, IntraMUSCular, PRN, Chichi Prince MD    dextrose 10% infusion 0-250 mL, 0-250 mL, IntraVENous, PRN, Jenise Simon Che, MD    labetaloL (NORMODYNE) tablet 100 mg, 100 mg, Oral, BID, Jenise Simon Che, MD, 100 mg at 10/20/22 0800    losartan (COZAAR) tablet 100 mg, 100 mg, Oral, DAILY, Chichi Prince MD, 100 mg at 10/20/22 0800    sodium chloride (NS) flush 5-40 mL, 5-40 mL, IntraVENous, Q8H, Jenise Simon Che, MD, 10 mL at 10/20/22 0510    sodium chloride (NS) flush 5-40 mL, 5-40 mL, IntraVENous, PRN, Chichi Prince MD    acetaminophen (TYLENOL) tablet 650 mg, 650 mg, Oral, Q6H PRN **OR** [DISCONTINUED] acetaminophen (TYLENOL) suppository 650 mg, 650 mg, Rectal, Q6H PRN, Chihci Prince MD    polyethylene glycol (MIRALAX) packet 17 g, 17 g, Oral, DAILY PRN, Jenise Simon Che, MD    ondansetron (ZOFRAN ODT) tablet 4 mg, 4 mg, Oral, Q8H PRN **OR** ondansetron (ZOFRAN) injection 4 mg, 4 mg, IntraVENous, Q6H PRN, Chichi Prince MD    nitroglycerin (NITROSTAT) tablet 0.4 mg, 0.4 mg, SubLINGual, PRN, Chichi Bowling, MD      Case discussed with collaborating physician Dr. Wendy Dennis  and our impression and recommendations are as follows:     Chest pain:   - no active chest pain at this time  - HS troponin 41 > 36  - continue  heparin gtt and asa 81 mg daily, no PLAVIX  - S/p LHC: 70% mid LM, 60 mid LAD  - patient to transfer to higher level of care for possible CABG, pending bed availability. Patient has agreed to transfer.      2. Hypertension:   - blood pressure at goal.  - continue labetalol 100 mg daily and losartan 100 mg    3. DM II:   - management per primary    Thank you for involving us in the care of this patient. Please do not hesitate to call if additional questions arise. If after hours please call [phone]. Dr. Lencho Mena [facility]  [address] [facility].    [address] [facility], [facility] [facility]  [phone]

## 2022-10-20 NOTE — ROUTINE PROCESS
Spoke with Dr. Liliana Lepe regarding the patients diet. Dr. Liliana Lepe stated the patient could continue a regular diet at this time.      Maryann Middleton RN

## 2022-10-20 NOTE — DISCHARGE SUMMARY
Physician Discharge Summary     Patient ID:    Mariano Cline  [id number]  60 y.o.  1965    Admit date: 10/18/2022    Discharge date : 10/20/2022    Chronic Diagnoses:    Problem List as of 10/20/2022 Date Reviewed: 12/14/2021            Codes Class Noted - Resolved    Chest pain ICD-10-CM: R07.9  ICD-9-CM: 786.50  10/18/2022 - Present        MDD (major depressive disorder) ICD-10-CM: F32.9  ICD-9-CM: 296.20  1/16/2022 - Present        Suicidal ideation ICD-10-CM: R45.851  ICD-9-CM: V62.84  1/16/2022 - Present        Cocaine abuse ([facility] ICD-10-CM: F14.10  ICD-9-CM: 305.60  1/16/2022 - Present        Prostate cancer screening ICD-10-CM: Z12.5  ICD-9-CM: V76.44  8/20/2021 - Present    Overview Signed 8/20/2021 10:03 AM by Odell Awad NP     3/22/21: PSA was 0.2             Low testosterone ICD-10-CM: R79.89  ICD-9-CM: 790.99  8/20/2021 - Present    Overview Signed 8/20/2021 10:04 AM by Odell Awad NP     3/22/21 serum T was 206  4/6/21 serum T was 323, FSH and LH normal.             Fissure in skin ICD-10-CM: R23.4  ICD-9-CM: 709.8  5/27/2021 - Present        Severe obesity ([facility] ICD-10-CM: E66.01  ICD-9-CM: 278.01  3/22/2021 - Present        Acquired left foot drop ICD-10-CM: M21.372  ICD-9-CM: 736.79  3/22/2021 - Present        Mononeuropathy ICD-10-CM: G58.9  ICD-9-CM: 355.9  11/27/2020 - Present    Overview Signed 11/27/2020  2:07 PM by Wallace Champagne     Due to type 2 diabetes mellitus             Peripheral circulatory disorder due to type 2 diabetes mellitus ([facility] ICD-10-CM: E11.51  ICD-9-CM: 250.70, 443.81  11/27/2020 - Present        Onychomycosis ICD-10-CM: B35.1  ICD-9-CM: 110.1  8/11/2020 - Present        Plantar fasciitis of right foot ICD-10-CM: M72.2  ICD-9-CM: 728.71  8/11/2020 - Present        Severe obesity (BMI 35.0-35.9 with comorbidity) ([facility] ICD-10-CM: E66.01, Z68.35  ICD-9-CM: 278.01, V85.35  7/27/2020 - Present    Overview Signed 8/20/2021 10:05 AM by Hawk Hunt NP     BMI 35.74 on 8/20/21             Cataract ICD-10-CM: H26.9  ICD-9-CM: 366.9  7/15/2020 - Present        Diabetic peripheral neuropathy ([facility] ICD-10-CM: E11.42  ICD-9-CM: 250.60, 357.2  7/15/2020 - Present        Abnormal Q waves on electrocardiogram ICD-10-CM: R94.31  ICD-9-CM: 794.31  7/15/2020 - Present        Elevated liver enzymes ICD-10-CM: R74.8  ICD-9-CM: 790.5  7/15/2020 - Present        Essential hypertension ICD-10-CM: I10  ICD-9-CM: 401.9  7/15/2020 - Present        Hyperlipidemia ICD-10-CM: E78.5  ICD-9-CM: 272.4  7/15/2020 - Present        Diabetic mononeuropathy associated with type 2 diabetes mellitus ([facility] ICD-10-CM: E11.41  ICD-9-CM: 250.60, 355.9  7/15/2020 - Present        Pain in both lower legs ICD-10-CM: M79.661, M79.662  ICD-9-CM: 729.5  7/15/2020 - Present        Peripheral arterial occlusive disease ([facility] [address] ICD-10-CM: I77.9  ICD-9-CM: 444.22  7/15/2020 - Present        Peripheral circulatory disorder associated with type 2 diabetes mellitus ([facility] ICD-10-CM: E11.51  ICD-9-CM: 250.70  7/15/2020 - Present        ED (erectile dysfunction) ICD-10-CM: N52.9  ICD-9-CM: 607.84  7/15/2020 - Present    Overview Signed 8/20/2021 10:02 AM by Hawk Hunt NP     4/9/21 Magali Hejerry): erectile dysfunction, gradual deterioration since 2014 at which time patient had a CVA. The patient was given sildenafil 20 mg tablets which were not really effective. He was prescribed Trimix.               Spasm of back muscles ICD-10-CM: M62.830  ICD-9-CM: 724.8  7/15/2020 - Present        Diabetes mellitus type 2, controlled ([facility] ICD-10-CM: E11.9  ICD-9-CM: 250.00  7/15/2020 - Present    Overview Signed 8/20/2021 10:05 AM by Hawk Hunt NP     3/22/21 hemoglobin A1c was 8.2             Arteriosclerotic gangrene ([facility] ICD-10-CM: U16.263  ICD-9-CM: 440.24  7/15/2020 - Present        Atherosclerosis of arteries of extremities ([facility] ICD-10-CM: L13.999  ICD-9-CM: 440.20  7/15/2020 - Present Overview Signed 2021 10:07 AM by Willa Hidalgo NP     PAD             Coronary arteriosclerosis in native artery ICD-10-CM: I25.10  ICD-9-CM: 414.01  7/15/2020 - Present        Ulcer of foot [facility]) ICD-10-CM: L97.509  ICD-9-CM: 707.15  7/15/2020 - Present       22    Final Diagnoses:   Chest pain [R07.9]  Left main coronary artery disease  Type 2 diabetes  Hyperlipidemia  Morbid obesity    Reason for Hospitalization:  Mid substernal chest pain      Hospital Course:   Pleasant 64-year-old gentleman with history of essential hypertension, diabetes and hyperlipidemia sent to the hospital from cardiology's office for evaluation of chest pain. Reportedly undergoing cardiac stress testing and reported significant chest pain. Testing aborted and patient sent to the hospital for cardiac cath. Cardiac catheterization done  showed left main disease. Patient was recommended CABG. Accepted in transfer to [facility] pending bed availability. He will continue on IV heparin and Plavix has been stopped in preparation for CABG. Fairly stable for transfer when transportation and bed available              Discharge Medications:   Current Discharge Medication List            Follow up Care:    1. Other, MD Sam in 1-2 weeks. Please call to set up an appointment shortly after discharge. Diet:  Cardiac Diet    Disposition:  [facility].    Advanced Directive:   FULL    DNR      Discharge Exam:  Visit Vitals  /67 (BP 1 Location: Left upper arm, BP Patient Position: At rest;Semi fowlers)   Pulse 63   Temp 97.8 °F (36.6 °C)   Resp 24   Ht 5' 9\" (1.753 m)   Wt 108.9 kg (240 lb)   SpO2 98%   BMI 35.44 kg/m²      O2 Device: None (Room air)    Temp (24hrs), Av.9 °F (36.6 °C), Min:97.8 °F (36.6 °C), Max:98.1 °F (36.7 °C)    No intake/output data recorded. 10/18 1901 - 10/20 0700  In: -   Out: 3300 [Urine:3300]    General:  Alert, cooperative, no distress, appears stated age.    Lungs: Clear to auscultation bilaterally. Chest wall:  No tenderness or deformity. Heart:  Regular rate and rhythm, S1, S2 normal, no murmur, click, rub or gallop. Abdomen:   Soft, non-tender. Bowel sounds normal. No masses,  No organomegaly. Extremities: Extremities normal, atraumatic, no cyanosis or edema. Pulses: 2+ and symmetric all extremities. Skin: Skin color, texture, turgor normal. No rashes or lesions   Neurologic: CNII-XII intact. No gross sensory or motor deficits         CONSULTATIONS: Cardiology    Significant Diagnostic Studies:   10/18/2022: BUN 14 mg/dL (Ref range: 6 - 20 mg/dL); Calcium 9.3 mg/dL (Ref range: 8.5 - 10.1 mg/dL); CO2 30 mmol/L (Ref range: 21 - 32 mmol/L); Creatinine 1.16 mg/dL (Ref range: 0.70 - 1.30 mg/dL); Glucose 299 mg/dL (H; Ref range: 65 - 100 mg/dL); HCT 38.5 % (Ref range: 36.6 - 50.3 %); HGB 13.0 g/dL (Ref range: 12.1 - 17.0 g/dL); Potassium 4.1 mmol/L (Ref range: 3.5 - 5.1 mmol/L); Sodium 140 mmol/L (Ref range: 136 - 145 mmol/L)  10/19/2022: BUN 14 mg/dL (Ref range: 6 - 20 mg/dL); Calcium 9.2 mg/dL (Ref range: 8.5 - 10.1 mg/dL); CO2 28 mmol/L (Ref range: 21 - 32 mmol/L); Creatinine 1.06 mg/dL (Ref range: 0.70 - 1.30 mg/dL); Glucose 289 mg/dL (H; Ref range: 65 - 100 mg/dL); HCT 36.4 % (L; Ref range: 36.6 - 50.3 %); HGB 12.5 g/dL (Ref range: 12.1 - 17.0 g/dL); Potassium 3.9 mmol/L (Ref range: 3.5 - 5.1 mmol/L);  Sodium 142 mmol/L (Ref range: 136 - 145 mmol/L)  Recent Labs     10/19/22  0730 10/18/22  1844   WBC 8.3 4.3   HGB 12.5 13.0   HCT 36.4* 38.5    311     Recent Labs     10/19/22  0730 10/18/22  1844    140   K 3.9 4.1    107   CO2 28 30   BUN 14 14   CREA 1.06 1.16   * 299*   CA 9.2 9.3     Recent Labs     10/18/22  1844   ALT 50   AP 67   TBILI 0.3   TP 6.8   ALB 3.6   GLOB 3.2     Recent Labs     10/20/22  0728 10/19/22  1551 10/19/22  0853 10/19/22  0119 10/18/22  1844   INR  --   --   --   --  1.0   PTP  --   --   --   --  13.1   APTT 69.2* 108.1* 61.8*   < > 26.8    < > = values in this interval not displayed. No results for input(s): FE, TIBC, PSAT, FERR in the last 72 hours. No results for input(s): PH, PCO2, PO2 in the last 72 hours. No results for input(s): CPK, CKMB in the last 72 hours.     No lab exists for component: TROPONINI  Lab Results   Component Value Date/Time    Glucose (POC) 204 (H) 10/20/2022 07:39 AM    Glucose (POC) 343 (H) 10/19/2022 08:55 PM    Glucose (POC) 172 (H) 10/19/2022 05:10 PM    Glucose (POC) 256 (H) 10/19/2022 10:46 AM    Glucose (POC) 297 (H) 10/19/2022 08:23 AM       Total Time: 35 minutes    Signed:  Briseida Dillon MD  10/20/2022  11:03 AM

## 2022-10-20 NOTE — PROGRESS NOTES
Progress Note  Date:10/20/2022       Room:[facility]  Patient Suyapa uFlton     YOB: 1965     Age:56 y.o. Subjective    Subjective 60 y.o. male with PMH of hypertension, diabetes, hyperlipidemia. He presented to the ED with chief complaint of chest pain. Patient reports over the last 2 weeks the patient has been having exertional chest pain. He was following with his cardiologist today over at [facility] cardiology where he was receiving a stress test and began to have chest pain. Chest pain is squeezing in nature, rated 8/10 at maximum. Substernal, nonradiating. Worsening with exertion. Otherwise denies dizziness, diaphoresis, nausea or vomiting. In ED, troponin negative, EKG without SANTY. Heparin drip started. Seen on follow up. Patient is sitting in bed on room air. He has not had any pain or shortness of breath and denies any other symptoms. POD #1 cardiac cath, no stent was placed. Patient awaiting transfer to [facility] for possible cardiac bypass. Review of Systems Otherwise negative. Objective         Visit Vitals  /67 (BP 1 Location: Left upper arm, BP Patient Position: At rest;Semi fowlers)   Pulse 63   Temp 97.8 °F (36.6 °C)   Resp 24   Ht 5' 9\" (1.753 m)   Wt 240 lb (108.9 kg)   SpO2 98%   BMI 35.44 kg/m²     General:  Alert, cooperative, no distress. Head:  Normocephalic, without obvious abnormality, atraumatic. Eyes:  Conjunctivae/corneas clear. Pupils equal, round, reactive to light. Extraocular movements intact. Lungs:   Clear to auscultation bilaterally. Chest wall:  No tenderness or deformity. Heart:  Regular rate and rhythm, S1, S2 normal, no murmur, click, rub, or gallop. Abdomen:   Soft, non-tender. Bowel sounds normal. No masses. No organomegaly. Extremities: Extremities normal, atraumatic, no cyanosis or edema. Pulses: 2+ and symmetric all extremities. Skin: Skin color, texture, turgor normal. No rashes or lesions.    Lymph nodes: Cervical, supraclavicular, and axillary nodes normal.   Neurologic: CNII-XII intact. Normal strength, sensation, and reflexes throughout. I/O (24Hr): Intake/Output Summary (Last 24 hours) at 10/20/2022 0920  Last data filed at 10/20/2022 0606  Gross per 24 hour   Intake --   Output 2550 ml   Net -2550 ml     Objective:  Vital signs: (most recent): Blood pressure 114/67, pulse 63, temperature 97.8 °F (36.6 °C), resp. rate 24, height 5' 9\" (1.753 m), weight 240 lb (108.9 kg), SpO2 98 %. Labs/Imaging/Diagnostics    Labs:  CBC:  Recent Labs     10/19/22  0730 10/18/22  1844   WBC 8.3 4.3   RBC 4.24 4.47   HGB 12.5 13.0   HCT 36.4* 38.5   MCV 85.8 86.1   RDW 11.4* 11.6    311     CHEMISTRIES:  Recent Labs     10/19/22  0730 10/18/22  1844    140   K 3.9 4.1    107   CO2 28 30   BUN 14 14   CREA 1.06 1.16   CA 9.2 9.3   PT/INR:  Recent Labs     10/18/22  1844   INR 1.0     APTT:  Recent Labs     10/20/22  0728 10/19/22  1551 10/19/22  0853   APTT 69.2* 108.1* 61.8*     LIVER PROFILE:  Recent Labs     10/18/22  1844   AST 24   ALT 50     Lab Results   Component Value Date/Time    ALT (SGPT) 50 10/18/2022 06:44 PM    AST (SGOT) 24 10/18/2022 06:44 PM    Alk. phosphatase 67 10/18/2022 06:44 PM    Bilirubin, total 0.3 10/18/2022 06:44 PM       Imaging Last 24 Hours:  CARDIAC PROCEDURE    Result Date: 10/19/2022  · Procedures performed: LHC, selective right and left coronary angiography, moderate sedation 17 minutes · Findings: LM: 70% mid LM stenosis. LAD: 60% proximal stenosis. LCX: Luminal disease, large OM1/ramus. RCA: Diffuse mild disease LVEDP 17mmHg · Recommendation: Stop plavix. C/w ASA and heparin drip, will transfer for CABG.      Assessment//Plan           Patient Active Problem List    Diagnosis Date Noted    Chest pain 10/18/2022    MDD (major depressive disorder) 01/16/2022    Suicidal ideation 01/16/2022    Cocaine abuse ([facility] 01/16/2022    Prostate cancer screening 08/20/2021    Low testosterone 08/20/2021    Fissure in skin 05/27/2021    Severe obesity ([address] 03/22/2021    Acquired left foot drop 03/22/2021    Mononeuropathy 11/27/2020    Peripheral circulatory disorder due to type 2 diabetes mellitus ([address] 11/27/2020    Onychomycosis 08/11/2020    Plantar fasciitis of right foot 08/11/2020    Severe obesity (BMI 35.0-35.9 with comorbidity) ([address] 07/27/2020    Cataract 07/15/2020    Diabetic peripheral neuropathy ([address] 07/15/2020    Abnormal Q waves on electrocardiogram 07/15/2020    Elevated liver enzymes 07/15/2020    Essential hypertension 07/15/2020    Hyperlipidemia 07/15/2020    Diabetic mononeuropathy associated with type 2 diabetes mellitus ([address] 07/15/2020    Pain in both lower legs 07/15/2020    Peripheral arterial occlusive disease ([address] 07/15/2020    Peripheral circulatory disorder associated with type 2 diabetes mellitus ([address] 07/15/2020    ED (erectile dysfunction) 07/15/2020    Spasm of back muscles 07/15/2020    Diabetes mellitus type 2, controlled ([address] 07/15/2020    Arteriosclerotic gangrene ([address] 07/15/2020    Atherosclerosis of arteries of extremities ([address] 07/15/2020    Coronary arteriosclerosis in native artery 07/15/2020    Ulcer of foot ([address] 07/15/2020     Assessment & Plan     Unstable angina    -Continue heparin GTT    -Cardiac catheterization POD #1    -Follow-up 2D echocardiogram    -Transfer to [facility] for possible bypass     Type 2 diabetes    -Blood sugar in the 200 range    -We will hold insulin for now given n.p.o. status    -Restart insulin once patient begins oral diet     Hyperlipidemia    -Continue on oral statin     Obesity    -Counseled on dieting and exercise        Plan:  Awaiting transfer to [facility]     Electronically signed by Noe Hunter on 10/20/2022 at 9:20 AM    *ATTENTION:  This note has been created by a medical student for educational purposes only.   Please do not refer to the content of this note for clinical decision-making, billing, or other purposes. Please see attending physicians note to obtain clinical information on this patient. *

## 2022-10-21 LAB
APTT PPP: 91.3 SEC (ref 21.2–34.1)
THERAPEUTIC RANGE,PTTT: ABNORMAL SEC (ref 82–109)

## 2022-10-21 PROCEDURE — 85730 THROMBOPLASTIN TIME PARTIAL: CPT

## 2022-10-21 PROCEDURE — 36415 COLL VENOUS BLD VENIPUNCTURE: CPT

## 2022-10-21 NOTE — PROGRESS NOTES
Written report faxed and verbal report given to [facility], opportunities for questions given. Will call to update on patient departure when transport arrives.

## 2022-11-09 ENCOUNTER — HOSPITAL ENCOUNTER (EMERGENCY)
Age: 57
Discharge: HOME | End: 2022-11-09
Attending: STUDENT IN AN ORGANIZED HEALTH CARE EDUCATION/TRAINING PROGRAM
Payer: MEDICARE

## 2022-11-09 VITALS
BODY MASS INDEX: 34.8 KG/M2 | WEIGHT: 235 LBS | HEART RATE: 84 BPM | TEMPERATURE: 98 F | DIASTOLIC BLOOD PRESSURE: 79 MMHG | RESPIRATION RATE: 16 BRPM | OXYGEN SATURATION: 100 % | HEIGHT: 69 IN | SYSTOLIC BLOOD PRESSURE: 126 MMHG

## 2022-11-09 DIAGNOSIS — R33.9 URINARY RETENTION: Primary | ICD-10-CM

## 2022-11-09 LAB
ALBUMIN SERPL-MCNC: 3.4 G/DL (ref 3.5–5)
ALBUMIN/GLOB SERPL: 0.9 {RATIO} (ref 1.1–2.2)
ALP SERPL-CCNC: 93 U/L (ref 45–117)
ALT SERPL-CCNC: 35 U/L (ref 12–78)
ANION GAP SERPL CALC-SCNC: 9 MMOL/L (ref 5–15)
APPEARANCE UR: CLEAR
AST SERPL W P-5'-P-CCNC: 31 U/L (ref 15–37)
BACTERIA URNS QL MICRO: NEGATIVE /HPF
BASOPHILS # BLD: 0 K/UL (ref 0–0.1)
BASOPHILS NFR BLD: 1 % (ref 0–1)
BILIRUB SERPL-MCNC: 0.2 MG/DL (ref 0.2–1)
BILIRUB UR QL: NEGATIVE
BUN SERPL-MCNC: 11 MG/DL (ref 6–20)
BUN/CREAT SERPL: 11 (ref 12–20)
CA-I BLD-MCNC: 9.6 MG/DL (ref 8.5–10.1)
CHLORIDE SERPL-SCNC: 105 MMOL/L (ref 97–108)
CO2 SERPL-SCNC: 27 MMOL/L (ref 21–32)
COLOR UR: NORMAL
CREAT SERPL-MCNC: 0.99 MG/DL (ref 0.7–1.3)
DIFFERENTIAL METHOD BLD: ABNORMAL
EOSINOPHIL # BLD: 0.2 K/UL (ref 0–0.4)
EOSINOPHIL NFR BLD: 4 % (ref 0–7)
ERYTHROCYTE [DISTWIDTH] IN BLOOD BY AUTOMATED COUNT: 11.9 % (ref 11.5–14.5)
GLOBULIN SER CALC-MCNC: 4 G/DL (ref 2–4)
GLUCOSE SERPL-MCNC: 132 MG/DL (ref 65–100)
GLUCOSE UR STRIP.AUTO-MCNC: NEGATIVE MG/DL
HCT VFR BLD AUTO: 34.2 % (ref 36.6–50.3)
HGB BLD-MCNC: 11.4 G/DL (ref 12.1–17)
HGB UR QL STRIP: NEGATIVE
IMM GRANULOCYTES # BLD AUTO: 0 K/UL (ref 0–0.04)
IMM GRANULOCYTES NFR BLD AUTO: 0 % (ref 0–0.5)
INR PPP: 1 (ref 0.9–1.1)
KETONES UR QL STRIP.AUTO: NEGATIVE MG/DL
LEUKOCYTE ESTERASE UR QL STRIP.AUTO: NEGATIVE
LYMPHOCYTES # BLD: 1.5 K/UL (ref 0.8–3.5)
LYMPHOCYTES NFR BLD: 30 % (ref 12–49)
MCH RBC QN AUTO: 29 PG (ref 26–34)
MCHC RBC AUTO-ENTMCNC: 33.3 G/DL (ref 30–36.5)
MCV RBC AUTO: 87 FL (ref 80–99)
MONOCYTES # BLD: 0.4 K/UL (ref 0–1)
MONOCYTES NFR BLD: 9 % (ref 5–13)
NEUTS SEG # BLD: 2.7 K/UL (ref 1.8–8)
NEUTS SEG NFR BLD: 56 % (ref 32–75)
NITRITE UR QL STRIP.AUTO: NEGATIVE
NRBC # BLD: 0 K/UL (ref 0–0.01)
NRBC BLD-RTO: 0 PER 100 WBC
PH UR STRIP: 6 [PH] (ref 5–8)
PLATELET # BLD AUTO: 504 K/UL (ref 150–400)
PMV BLD AUTO: 10.9 FL (ref 8.9–12.9)
POTASSIUM SERPL-SCNC: 3.9 MMOL/L (ref 3.5–5.1)
PROT SERPL-MCNC: 7.4 G/DL (ref 6.4–8.2)
PROT UR STRIP-MCNC: NEGATIVE MG/DL
PROTHROMBIN TIME: 13.5 SEC (ref 11.9–14.6)
RBC # BLD AUTO: 3.93 M/UL (ref 4.1–5.7)
RBC #/AREA URNS HPF: NORMAL /HPF (ref 0–5)
SODIUM SERPL-SCNC: 141 MMOL/L (ref 136–145)
SP GR UR REFRACTOMETRY: 1.01 (ref 1–1.03)
UROBILINOGEN UR QL STRIP.AUTO: 0.1 EU/DL (ref 0.1–1)
WBC # BLD AUTO: 4.9 K/UL (ref 4.1–11.1)
WBC URNS QL MICRO: NORMAL /HPF (ref 0–4)

## 2022-11-09 PROCEDURE — 99283 EMERGENCY DEPT VISIT LOW MDM: CPT

## 2022-11-09 PROCEDURE — 80053 COMPREHEN METABOLIC PANEL: CPT

## 2022-11-09 PROCEDURE — 85610 PROTHROMBIN TIME: CPT

## 2022-11-09 PROCEDURE — 81003 URINALYSIS AUTO W/O SCOPE: CPT

## 2022-11-09 PROCEDURE — 85025 COMPLETE CBC W/AUTO DIFF WBC: CPT

## 2022-11-09 NOTE — ED PROVIDER NOTES
EMERGENCY DEPARTMENT HISTORY AND PHYSICAL EXAM      Date: 11/9/2022  Patient Name: Allen Dowell    History of Presenting Illness     Chief Complaint   Patient presents with    Urinary Retention       History Provided By: Patient    HPI: Allen Dowell, 60 y.o. male with past medical history as reviewed below presents for evaluation of inability to urinate. Patient reports that he recently had open heart surgery and has had issues with urinating since then. He has required recatheterization, but it has been approximately 2 weeks since he had any catheter in his urethra. He reports waking up today feeling like he was unable to urinate. This symptom is associated with low pelvic fullness that is uncomfortable. He denies any known trauma from recent catheterizations. No difficulty with bowel movements. No lower extremity weakness or numbness. There are no other complaints, changes, or physical findings at this time. PCP: Jemal, MD Sam    No current facility-administered medications on file prior to encounter. Current Outpatient Medications on File Prior to Encounter   Medication Sig Dispense Refill    glucose blood VI test strips (Prodigy No Coding) strip TEST  GLUCOSE 4 times DAILY 120 Strip 3    NovoLOG Flexpen U-100 Insulin 100 unit/mL (3 mL) inpn INJECT 2 UNITS PER 50u above 150mg/dl 3 times daily before meals as needed according to sliding scale. max of 36 units daily  Indications: type 2 diabetes mellitus 12 mL 3    hydroCHLOROthiazide (HYDRODIURIL) 25 mg tablet Take 1 Tablet by mouth daily. 90 Tablet 0    atorvastatin (LIPITOR) 40 mg tablet Take 1 Tablet by mouth daily. 90 Tablet 0    liraglutide (Victoza 3-Alfredo) 0.6 mg/0.1 mL (18 mg/3 mL) pnij 1.8 mg by SubCUTAneous route daily for 270 days. 162 mg 0    lidocaine (LIDODERM) 5 % APPLY 1 PATCH TO THE AFFECTED AREA FOR 12 HOURS (ON FOR 12 HOURS THEN OFF FOR 12 HOURS) 30 Patch 4    losartan (COZAAR) 100 mg tablet Take 1 Tablet by mouth daily. Indications: high blood pressure 30 Tablet 0    aspirin 81 mg chewable tablet Take 1 Tablet by mouth daily. 30 Tablet 0    glucose 4 gram chewable tablet Take 4 Tablets by mouth as needed for PRN Reason (Other) (hypoglycemia.). 100 Tablet 0    escitalopram oxalate (LEXAPRO) 10 mg tablet Take 1 Tablet by mouth daily. 30 Tablet 0    lancets (Prodigy Twist Top Lancet) 28 gauge misc by Does Not Apply route Before breakfast, lunch, dinner and at bedtime. 120 Lancet 0    Insulin Syringe-Needle U-100 0.3 mL 30 gauge x 5/16\" syrg Use to inject novolin in sliding scale as indicated 3 times per day before meals 100 Each 0    alcohol swabs (Alcohol Pads) padm Use to check glucose 3 times a day before meals 100 Pad 11    labetaloL (NORMODYNE) 100 mg tablet TAKE ONE TABLET BY MOUTH TWICE DAILY FOR 90 DAYS 180 Tablet 2    clopidogreL (PLAVIX) 75 mg tab Take 1 Tablet by mouth daily. 90 Tablet 2    insulin glargine (LANTUS) 100 unit/mL injection 60 units at bed time 1 mL 0    traZODone (DESYREL) 50 mg tablet Take 1 Tablet by mouth nightly as needed for Sleep.  Indications: major depressive disorder 30 Tablet 0       Past History     Past Medical History:  Past Medical History:   Diagnosis Date    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    Atherosclerosis of arteries of extremities ([address] 7/15/2020    Cataract 7/15/2020    Coronary arteriosclerosis in native artery 7/15/2020    Diabetes mellitus type 2, controlled ([address] 7/15/2020    Diabetic mononeuropathy associated with type 2 diabetes mellitus ([address] 7/15/2020    Diabetic peripheral neuropathy ([address] 7/15/2020    EKG, abnormal 7/15/2020    Elevated liver enzymes 7/15/2020    Erectile dysfunction 7/15/2020    Essential hypertension 7/15/2020    Hepatitis     High cholesterol     Hyperlipidemia 7/15/2020    Hypertension     Pain in both lower legs 7/15/2020    Peripheral arterial occlusive disease ([address] 7/15/2020    Peripheral circulatory disorder associated with type 2 diabetes mellitus ([facility] 7/15/2020    Spasm of back muscles 7/15/2020    Ulcer of foot ([facility] 7/15/2020       Past Surgical History:  Past Surgical History:   Procedure Laterality Date    HX CATARACT REMOVAL Bilateral     HX ORTHOPAEDIC Right     stents placed    HX ORTHOPAEDIC Left     HX WISDOM TEETH EXTRACTION      IR STENT PLACEMENT      Right Leg       Family History:  Family History   Problem Relation Age of Onset    Diabetes Mother     Heart Disease Mother     Diabetes Father     Diabetes Sister     Diabetes Brother        Social History:  Social History     Tobacco Use    Smoking status: Never    Smokeless tobacco: Never   Vaping Use    Vaping Use: Never used   Substance Use Topics    Alcohol use: Not Currently    Drug use: Never       Allergies:  No Known Allergies    Review of Systems   Review of Systems   Constitutional:  Negative for fever. HENT:  Negative for congestion. Respiratory:  Negative for cough and shortness of breath. Cardiovascular:  Negative for chest pain. Gastrointestinal:  Negative for abdominal pain, constipation, nausea and vomiting. Genitourinary:         Per HPI   Musculoskeletal:  Negative for arthralgias and myalgias. Skin:  Negative for rash. Allergic/Immunologic: Negative for immunocompromised state. Neurological:  Negative for syncope. Psychiatric/Behavioral:  Negative for confusion. Physical Exam   Physical Exam  Vitals reviewed. Constitutional:       General: He is not in acute distress. Appearance: He is not toxic-appearing. HENT:      Head: Normocephalic and atraumatic. Eyes:      Extraocular Movements: Extraocular movements intact. Pupils: Pupils are equal, round, and reactive to light. Cardiovascular:      Rate and Rhythm: Normal rate and regular rhythm. Heart sounds: Normal heart sounds. Pulmonary:      Effort: Pulmonary effort is normal.      Breath sounds: Normal breath sounds. Abdominal:      Palpations: Abdomen is soft. Tenderness: There is no abdominal tenderness. Musculoskeletal:      Right lower leg: No edema. Left lower leg: No edema. Skin:     General: Skin is warm and dry. Neurological:      General: No focal deficit present. Mental Status: He is alert. Psychiatric:         Mood and Affect: Mood normal.         Behavior: Behavior normal.       Lab and Diagnostic Study Results   Labs -     Recent Results (from the past 12 hour(s))   CBC WITH AUTOMATED DIFF    Collection Time: 11/09/22  4:07 AM   Result Value Ref Range    WBC 4.9 4.1 - 11.1 K/uL    RBC 3.93 (L) 4.10 - 5.70 M/uL    HGB 11.4 (L) 12.1 - 17.0 g/dL    HCT 34.2 (L) 36.6 - 50.3 %    MCV 87.0 80.0 - 99.0 FL    MCH 29.0 26.0 - 34.0 PG    MCHC 33.3 30.0 - 36.5 g/dL    RDW 11.9 11.5 - 14.5 %    PLATELET 489 (H) 033 - 400 K/uL    MPV 10.9 8.9 - 12.9 FL    NRBC 0.0 0.0  WBC    ABSOLUTE NRBC 0.00 0.00 - 0.01 K/uL    NEUTROPHILS 56 32 - 75 %    LYMPHOCYTES 30 12 - 49 %    MONOCYTES 9 5 - 13 %    EOSINOPHILS 4 0 - 7 %    BASOPHILS 1 0 - 1 %    IMMATURE GRANULOCYTES 0 0 - 0.5 %    ABS. NEUTROPHILS 2.7 1.8 - 8.0 K/UL    ABS. LYMPHOCYTES 1.5 0.8 - 3.5 K/UL    ABS. MONOCYTES 0.4 0.0 - 1.0 K/UL    ABS. EOSINOPHILS 0.2 0.0 - 0.4 K/UL    ABS. BASOPHILS 0.0 0.0 - 0.1 K/UL    ABS. IMM. GRANS. 0.0 0.00 - 0.04 K/UL    DF AUTOMATED     METABOLIC PANEL, COMPREHENSIVE    Collection Time: 11/09/22  4:07 AM   Result Value Ref Range    Sodium 141 136 - 145 mmol/L    Potassium 3.9 3.5 - 5.1 mmol/L    Chloride 105 97 - 108 mmol/L    CO2 27 21 - 32 mmol/L    Anion gap 9 5 - 15 mmol/L    Glucose 132 (H) 65 - 100 mg/dL    BUN 11 6 - 20 mg/dL    Creatinine 0.99 0.70 - 1.30 mg/dL    BUN/Creatinine ratio 11 (L) 12 - 20      eGFR >60 >60 ml/min/1.73m2    Calcium 9.6 8.5 - 10.1 mg/dL    Bilirubin, total 0.2 0.2 - 1.0 mg/dL    AST (SGOT) 31 15 - 37 U/L    ALT (SGPT) 35 12 - 78 U/L    Alk.  phosphatase 93 45 - 117 U/L    Protein, total 7.4 6.4 - 8.2 g/dL    Albumin 3.4 (L) 3.5 - 5.0 g/dL    Globulin 4.0 2.0 - 4.0 g/dL    A-G Ratio 0.9 (L) 1.1 - 2.2     PROTHROMBIN TIME + INR    Collection Time: 11/09/22  4:07 AM   Result Value Ref Range    Prothrombin time 13.5 11.9 - 14.6 sec    INR 1.0 0.9 - 1.1     URINALYSIS W/ RFLX MICROSCOPIC    Collection Time: 11/09/22  4:29 AM   Result Value Ref Range    Color Yellow/Straw      Appearance Clear Clear      Specific gravity 1.013 1.003 - 1.030      pH (UA) 6.0 5.0 - 8.0      Protein Negative Negative mg/dL    Glucose Negative Negative mg/dL    Ketone Negative Negative mg/dL    Bilirubin Negative Negative      Blood Negative Negative      Urobilinogen 0.1 0.1 - 1.0 EU/dL    Nitrites Negative Negative      Leukocyte Esterase Negative Negative      WBC 5-10 0 - 4 /hpf    RBC 0-5 0 - 5 /hpf    Bacteria Negative Negative /hpf       Radiologic Studies -   @lastxrresult@  CT Results  (Last 48 hours)      None          CXR Results  (Last 48 hours)      None            Medical Decision Making and ED Course   Differential Diagnosis & Medical Decision Making Provider Note:   49-year-old male presenting for evaluation of inability to void. Bladder scan shows greater than 700 mL and is bladder concerning for urinary retention. This could be the result of a complication from recent catheterization based on his history versus other urologic pathology. Will evaluate with UA, CBC and BMP to evaluate kidney function. Will place [facility] and have patient follow-up with urology    - I am the first provider for this patient. I reviewed the vital signs, available nursing notes, past medical history, past surgical history, family history and social history. The patients presenting problems have been discussed, and they are in agreement with the care plan formulated and outlined with them. I have encouraged them to ask questions as they arise throughout their visit. Vital Signs-Reviewed the patient's vital signs.   Patient Vitals for the past 12 hrs:   Temp Pulse Resp BP SpO2   11/09/22 0309 98 °F (36.7 °C) 94 16 (!) 160/95 97 %       ED Course:          Procedures   Performed by: Nitesh Bingham MD  Procedures      Disposition   Disposition: DC- Adult Discharges: All of the diagnostic tests were reviewed and questions answered. Diagnosis, care plan and treatment options were discussed. The patient understands the instructions and will follow up as directed. The patients results have been reviewed with them. They have been counseled regarding their diagnosis. The patient verbally convey understanding and agreement of the signs, symptoms, diagnosis, treatment and prognosis and additionally agrees to follow up as recommended with their PCP in 24 - 48 hours. They also agree with the care-plan and convey that all of their questions have been answered. I have also put together some discharge instructions for them that include: 1) educational information regarding their diagnosis, 2) how to care for their diagnosis at home, as well a 3) list of reasons why they would want to return to the ED prior to their follow-up appointment, should their condition change. DISCHARGE PLAN:  1. Current Discharge Medication List        CONTINUE these medications which have NOT CHANGED    Details   glucose blood VI test strips (Prodigy No Coding) strip TEST  GLUCOSE 4 times DAILY  Qty: 120 Strip, Refills: 3    Associated Diagnoses: Controlled type 2 diabetes mellitus with diabetic polyneuropathy, with long-term current use of insulin (HCC)      NovoLOG Flexpen U-100 Insulin 100 unit/mL (3 mL) inpn INJECT 2 UNITS PER 50u above 150mg/dl 3 times daily before meals as needed according to sliding scale. max of 36 units daily  Indications: type 2 diabetes mellitus  Qty: 12 mL, Refills: 3    Associated Diagnoses: Uncontrolled type 2 diabetes mellitus with hyperglycemia (HCC)      hydroCHLOROthiazide (HYDRODIURIL) 25 mg tablet Take 1 Tablet by mouth daily.   Qty: 90 Tablet, Refills: 0    Associated Diagnoses: Essential hypertension      atorvastatin (LIPITOR) 40 mg tablet Take 1 Tablet by mouth daily. Qty: 90 Tablet, Refills: 0      liraglutide (Victoza 3-Alfredo) 0.6 mg/0.1 mL (18 mg/3 mL) pnij 1.8 mg by SubCUTAneous route daily for 270 days. Qty: 162 mg, Refills: 0    Associated Diagnoses: Controlled type 2 diabetes mellitus with diabetic polyneuropathy, with long-term current use of insulin ([facility])      lidocaine (LIDODERM) 5 % APPLY 1 PATCH TO THE AFFECTED AREA FOR 12 HOURS (ON FOR 12 HOURS THEN OFF FOR 12 HOURS)  Qty: 30 Patch, Refills: 4    Associated Diagnoses: Diabetic peripheral neuropathy (HCC)      losartan (COZAAR) 100 mg tablet Take 1 Tablet by mouth daily. Indications: high blood pressure  Qty: 30 Tablet, Refills: 0      aspirin 81 mg chewable tablet Take 1 Tablet by mouth daily. Qty: 30 Tablet, Refills: 0      glucose 4 gram chewable tablet Take 4 Tablets by mouth as needed for PRN Reason (Other) (hypoglycemia.). Qty: 100 Tablet, Refills: 0      escitalopram oxalate (LEXAPRO) 10 mg tablet Take 1 Tablet by mouth daily. Qty: 30 Tablet, Refills: 0      lancets (Prodigy Twist Top Lancet) 28 gauge misc by Does Not Apply route Before breakfast, lunch, dinner and at bedtime.   Qty: 120 Lancet, Refills: 0      Insulin Syringe-Needle U-100 0.3 mL 30 gauge x 5/16\" syrg Use to inject novolin in sliding scale as indicated 3 times per day before meals  Qty: 100 Each, Refills: 0    Associated Diagnoses: Controlled type 2 diabetes mellitus with diabetic polyneuropathy, with long-term current use of insulin ([facility])      alcohol swabs (Alcohol Pads) padm Use to check glucose 3 times a day before meals  Qty: 100 Pad, Refills: 11    Associated Diagnoses: Controlled type 2 diabetes mellitus with diabetic polyneuropathy, with long-term current use of insulin ([facility])      labetaloL (NORMODYNE) 100 mg tablet TAKE ONE TABLET BY MOUTH TWICE DAILY FOR 90 DAYS  Qty: 180 Tablet, Refills: 2    Associated Diagnoses: Essential hypertension      clopidogreL (PLAVIX) 75 mg tab Take 1 Tablet by mouth daily. Qty: 90 Tablet, Refills: 2    Associated Diagnoses: Peripheral arterial occlusive disease (HCC)      insulin glargine (LANTUS) 100 unit/mL injection 60 units at bed time  Qty: 1 mL, Refills: 0      traZODone (DESYREL) 50 mg tablet Take 1 Tablet by mouth nightly as needed for Sleep. Indications: major depressive disorder  Qty: 30 Tablet, Refills: 0           2. Follow-up Information       Follow up With Specialties Details Why [address] [place] [address] [facility]  Schedule an appointment as soon as possible for a visit in 1 week  [address] [facility]  [address] [place] [address]  [phone]          3. Return to ED if worse   4. Current Discharge Medication List          Diagnosis/Clinical Impression     Clinical Impression:   1. Urinary retention        Attestations: Hernesto Goldberg MD, am the primary clinician of record. Please note that this dictation was completed with Stublisher, the computer voice recognition software. Quite often unanticipated grammatical, syntax, homophones, and other interpretive errors are inadvertently transcribed by the computer software. Please disregard these errors. Please excuse any errors that have escaped final proofreading. Thank you.

## 2022-11-09 NOTE — ED NOTES
Pt discharged with umberto and MD informed pt to follow up with urology for outpatient consultation. Pt verbalized discharge instructions and all questions answered.  Pt given leg bag for shipman

## 2022-11-09 NOTE — ED TRIAGE NOTES
Woke up this am to urinate and was unable too. States he dabbed his penis with toilet tissue and it was bloody. Is on anticoag's s/p open heart surgery in October.

## 2022-11-09 NOTE — ED NOTES
Shipman placed per verbal order from ED physician. Pt tolerated procedure well, shipman draining yellow urine.  Pt denies pain currently

## 2022-11-11 NOTE — PROGRESS NOTES
HISTORY OF PRESENT ILLNESS    Rai Haq is a 60 y.o. male. 11/9/22-Presented to ED for for  inability to urinate. He has a history of CVA, diabetes,, ED. He recently had open heart surgery and has had issues with urinating since then. He has required recatheterization. He was found to have 700 cc of urine in his bladder, shipman catheter was inserted  History:   He has been a patient of Dr. Addie Moy. He has no previous history of kidney stones, urinary tract infection, family history of prostate cancer or other genitourinary malignancies    Erectile dysfunction, gradual deterioration since 2014 at which time patient had a CVA. Past Medical History:  PMHx (including negatives):  has a past medical history of Arteriosclerotic gangrene ([address] (7/15/2020), Atherosclerosis of arteries of extremities ([address] (7/15/2020), Cataract (7/15/2020), Coronary arteriosclerosis in native artery (7/15/2020), Diabetes mellitus type 2, controlled ([address] (7/15/2020), Diabetic mononeuropathy associated with type 2 diabetes mellitus ([address] (7/15/2020), Diabetic peripheral neuropathy ([address] (7/15/2020), EKG, abnormal (7/15/2020), Elevated liver enzymes (7/15/2020), Erectile dysfunction (7/15/2020), Essential hypertension (7/15/2020), Hepatitis, High cholesterol, Hyperlipidemia (7/15/2020), Hypertension, Pain in both lower legs (7/15/2020), Peripheral arterial occlusive disease ([address] (7/15/2020), Peripheral circulatory disorder associated with type 2 diabetes mellitus ([address] (7/15/2020), Spasm of back muscles (7/15/2020), and Ulcer of foot ([address] (7/15/2020). PSurgHx:  has a past surgical history that includes ir stent placement; hx orthopaedic (Right); hx orthopaedic (Left); hx cataract removal (Bilateral); and hx wisdom teeth extraction. PSocHx:  reports that he has never smoked. He has never used smokeless tobacco. He reports that he does not currently use alcohol. He reports that he does not use drugs.      Home Medications Medication Sig Start Date End Date Taking? Authorizing Provider   HumuLIN N NPH U-100 Insulin 100 unit/mL injection INJECT EIGHT UNITS SUBCUTANEOUSLY ONCE DAILY AND INJECT sliding scale THREE TIMES DAILY BEFORE MEALS (max 12 UNITS PER DOSE AND 36 UNITS DAILY) 10/31/22  Yes Provider, Historical   FeroSuL 325 mg (65 mg iron) tablet Take 325 mg by mouth daily. 8/24/22  Yes Provider, Historical   tamsulosin (FLOMAX) 0.4 mg capsule Take 1 Capsule by mouth daily. 11/15/22  Yes Cassie De La Fuente, NP   NovoLOG Flexpen U-100 Insulin 100 unit/mL (3 mL) inpn INJECT 2 UNITS PER 50u above 150mg/dl 3 times daily before meals as needed according to sliding scale. max of 36 units daily  Indications: type 2 diabetes mellitus 6/18/22  Yes Franco Razo MD   hydroCHLOROthiazide (HYDRODIURIL) 25 mg tablet Take 1 Tablet by mouth daily. 6/18/22  Yes Franco Razo MD   atorvastatin (LIPITOR) 40 mg tablet Take 1 Tablet by mouth daily. 6/6/22  Yes Shamika Adames MD   liraglutide (Victoza 3-Alfredo) 0.6 mg/0.1 mL (18 mg/3 mL) pnij 1.8 mg by SubCUTAneous route daily for 270 days. 5/12/22 2/6/23 Yes Shamika Adames MD   losartan (COZAAR) 100 mg tablet Take 1 Tablet by mouth daily. Indications: high blood pressure 1/22/22  Yes Audrey Newton MD   labetaloL (NORMODYNE) 100 mg tablet TAKE ONE TABLET BY MOUTH TWICE DAILY FOR 90 DAYS 1/21/22  Yes Audrey Newton MD   clopidogreL (PLAVIX) 75 mg tab Take 1 Tablet by mouth daily. 1/21/22  Yes Audrey Newton MD   topiramate (TOPAMAX) 25 mg tablet Take 2 tablets by mouth 2 times daily. Patient not taking: Reported on 11/15/2022 8/15/22   Provider, Historical   sildenafil citrate (VIAGRA) 100 mg tablet Take 100 mg by mouth.   Patient not taking: Reported on 11/15/2022 3/9/22   Provider, Historical   UltiCare Pen Needle 32 gauge x 5/32\" ndle USE TO GIVE INSULIN INJECTIONS FOUR TIMES DAILY  Patient not taking: Reported on 11/15/2022 10/31/22   Provider, Historical omeprazole (PRILOSEC) 20 mg capsule  9/28/22   Provider, Historical   naloxone (NARCAN) 4 mg/actuation nasal spray Administer 1 spray into one nostril as needed for opioid reversal or respiratory depression. Call 911. If patient does not respond, or responds and then relapses, give additional doses every 2 to 3 minutes, alternating nostrils, until medical help arrives. Use as directed. Patient not taking: Reported on 11/15/2022 8/1/22   Provider, Historical   glucose blood VI test strips (Prodigy No Coding) strip TEST  GLUCOSE 4 times DAILY  Patient not taking: Reported on 11/15/2022 9/2/22   Shi Choi MD   lidocaine (LIDODERM) 5 % APPLY 1 PATCH TO THE AFFECTED AREA FOR 12 HOURS (ON FOR 12 HOURS THEN OFF FOR 12 HOURS)  Patient not taking: Reported on 11/15/2022 2/2/22   Shi Choi MD   aspirin 81 mg chewable tablet Take 1 Tablet by mouth daily. Patient not taking: Reported on 11/15/2022 1/22/22   Yara Jackson MD   glucose 4 gram chewable tablet Take 4 Tablets by mouth as needed for PRN Reason (Other) (hypoglycemia.). Patient not taking: Reported on 11/15/2022 1/21/22   Yara Jackson MD   escitalopram oxalate (LEXAPRO) 10 mg tablet Take 1 Tablet by mouth daily. Patient not taking: Reported on 11/15/2022 1/22/22   Yara Jackson MD   lancets (Prodigy Twist Top Lancet) 28 gauge misc by Does Not Apply route Before breakfast, lunch, dinner and at bedtime.   Patient not taking: Reported on 11/15/2022 1/21/22   Yara Jackson MD   Insulin Syringe-Needle U-100 0.3 mL 30 gauge x 5/16\" syrg Use to inject novolin in sliding scale as indicated 3 times per day before meals  Patient not taking: Reported on 11/15/2022 1/21/22   Yara Jackson MD   alcohol swabs (Alcohol Pads) padm Use to check glucose 3 times a day before meals  Patient not taking: Reported on 11/15/2022 1/21/22   Yara Jackson MD   insulin glargine (LANTUS) 100 unit/mL injection 60 units at bed time 1/21/22   Jm Ramos MD   traZODone (DESYREL) 50 mg tablet Take 1 Tablet by mouth nightly as needed for Sleep. Indications: major depressive disorder  Patient not taking: Reported on 11/15/2022 1/21/22   Jm Ramos MD        Chronic Conditions Addressed Today       1. ED (erectile dysfunction) - Primary     Overview      4/9/21 [facility]): erectile dysfunction, gradual deterioration since 2014 at which time patient had a CVA. The patient was given sildenafil 20 mg tablets which were not really effective. He was prescribed Trimix. Relevant Orders     PSA, TOTAL &  FREE    2. Urinary retention     Relevant Medications     FeroSuL 325 mg (65 mg iron) tablet     tamsulosin (FLOMAX) 0.4 mg capsule     Other Relevant Orders     AMB POC URINALYSIS DIP STICK AUTO W/O MICRO (Completed)     AMB POC PVR, KYLEE,POST-VOID RES,US,NON-IMAGING     PSA, TOTAL &  FREE       Review of Systems   Constitutional: Negative. HENT: Negative. Eyes: Negative. Respiratory: Negative. Cardiovascular: Negative. Gastrointestinal: Negative. Genitourinary: Negative. Skin: Negative. Neurological: Negative. Endo/Heme/Allergies: Negative. Patient denies the symptoms of COVID-19 per routine screening guidelines. Physical Exam  Constitutional: No acute distress. Well-nourished. Skin: No rash. ENT: No rhinorrhea. No cough. Head is normocephalic and atraumatic. Eye: No proptosis or conjunctival injections. Respiratory: No apparent respiratory distress. Gastrointestinal: wnl  Psychiatric: Cooperative. Appropriate mood and affect. ASSESSMENT and PLAN  Diagnoses and all orders for this visit:    1. Other male erectile dysfunction  -     PSA, TOTAL &  FREE    2. Urinary retention  -     AMB POC URINALYSIS DIP STICK AUTO W/O MICRO  -     AMB POC PVR, KYLEE,POST-VOID RES,US,NON-IMAGING  -     PSA, TOTAL &  FREE    Other orders  -     tamsulosin (FLOMAX) 0.4 mg capsule;  Take 1 Capsule by mouth daily.   Cystoscopy CMG voiding trail            [facility] may have a reminder for a \"due or due soon\" health maintenance. The patient has been encouraged to contact their primary care provider for follow-up on this health maintenance or other necessary and/or routine health screening.      Shelly Perez NP

## 2022-11-15 ENCOUNTER — OFFICE VISIT (OUTPATIENT)
Dept: UROLOGY | Age: 57
End: 2022-11-15
Payer: MEDICARE

## 2022-11-15 VITALS
BODY MASS INDEX: 34.8 KG/M2 | WEIGHT: 235 LBS | RESPIRATION RATE: 6 BRPM | TEMPERATURE: 96.3 F | HEART RATE: 86 BPM | SYSTOLIC BLOOD PRESSURE: 123 MMHG | OXYGEN SATURATION: 98 % | DIASTOLIC BLOOD PRESSURE: 85 MMHG | HEIGHT: 69 IN

## 2022-11-15 DIAGNOSIS — E11.41 DIABETIC MONONEUROPATHY ASSOCIATED WITH TYPE 2 DIABETES MELLITUS (HCC): ICD-10-CM

## 2022-11-15 DIAGNOSIS — R79.89 LOW TESTOSTERONE: ICD-10-CM

## 2022-11-15 DIAGNOSIS — R31.0 GROSS HEMATURIA: ICD-10-CM

## 2022-11-15 DIAGNOSIS — N52.8 OTHER MALE ERECTILE DYSFUNCTION: Primary | ICD-10-CM

## 2022-11-15 DIAGNOSIS — Z86.73 STATUS POST CVA: ICD-10-CM

## 2022-11-15 DIAGNOSIS — R33.9 URINARY RETENTION: ICD-10-CM

## 2022-11-15 LAB
BILIRUB UR QL: NEGATIVE
GLUCOSE UR-MCNC: NEGATIVE MG/DL
KETONES P FAST UR STRIP-MCNC: NEGATIVE MG/DL
PH UR STRIP: 6 [PH] (ref 4.6–8)
PROT UR QL STRIP: 100
SP GR UR STRIP: 1.01 (ref 1–1.03)
UA UROBILINOGEN AMB POC: NORMAL (ref 0.2–1)
URINALYSIS CLARITY POC: NORMAL
URINALYSIS COLOR POC: NORMAL
URINE BLOOD POC: NORMAL
URINE LEUKOCYTES POC: NORMAL
URINE NITRITES POC: NEGATIVE

## 2022-11-15 RX ORDER — NALOXONE HYDROCHLORIDE 4 MG/.1ML
SPRAY NASAL
COMMUNITY
Start: 2022-08-01

## 2022-11-15 RX ORDER — TOPIRAMATE 25 MG/1
TABLET ORAL
COMMUNITY
Start: 2022-08-15

## 2022-11-15 RX ORDER — INSULIN HUMAN 100 [IU]/ML
INJECTION, SUSPENSION SUBCUTANEOUS
COMMUNITY
Start: 2022-10-31

## 2022-11-15 RX ORDER — OMEPRAZOLE 20 MG/1
CAPSULE, DELAYED RELEASE ORAL
COMMUNITY
Start: 2022-09-28

## 2022-11-15 RX ORDER — TAMSULOSIN HYDROCHLORIDE 0.4 MG/1
0.4 CAPSULE ORAL DAILY
Qty: 90 CAPSULE | Refills: 3 | Status: SHIPPED | OUTPATIENT
Start: 2022-11-15

## 2022-11-15 RX ORDER — TAMSULOSIN HYDROCHLORIDE 0.4 MG/1
0.4 CAPSULE ORAL DAILY
Qty: 30 CAPSULE | Refills: 0 | Status: SHIPPED | OUTPATIENT
Start: 2022-11-15 | End: 2022-12-15

## 2022-11-15 RX ORDER — PEN NEEDLE, DIABETIC 32GX 5/32"
NEEDLE, DISPOSABLE MISCELLANEOUS
COMMUNITY
Start: 2022-10-31

## 2022-11-15 RX ORDER — SILDENAFIL 100 MG/1
100 TABLET, FILM COATED ORAL
COMMUNITY
Start: 2022-03-09

## 2022-11-15 RX ORDER — FERROUS SULFATE TAB 325 MG (65 MG ELEMENTAL FE) 325 (65 FE) MG
325 TAB ORAL DAILY
COMMUNITY
Start: 2022-08-24

## 2022-11-15 NOTE — PROGRESS NOTES
Chief Complaint   Patient presents with    New Patient    Hospital Follow Up    Urinary Retention       PHQ-9 score is    Negative    Vitals:    11/15/22 1059   BP: 123/85   Pulse: 86   Resp: (!) 6   Temp: (!) 96.3 °F (35.7 °C)   TempSrc: Temporal   SpO2: 98%   Weight: 235 lb (106.6 kg)   Height: 5' 9\" (1.753 m)        1. \"Have you been to the ER, urgent care clinic since your last visit? Hospitalized since your last visit? \" No    2. \"Have you seen or consulted any other health care providers outside of the [address] [place] [address] since your last visit? \" No     3. For patients aged 39-70: Has the patient had a colonoscopy / FIT/ Cologuard? No      If the patient is female:    4. For patients aged 41-77: Has the patient had a mammogram within the past 2 years? 5. For patients aged 21-65: Has the patient had a pap smear?

## 2022-11-16 ENCOUNTER — OFFICE VISIT (OUTPATIENT)
Dept: UROLOGY | Age: 57
End: 2022-11-16
Payer: MEDICARE

## 2022-11-16 VITALS
HEART RATE: 77 BPM | TEMPERATURE: 97.8 F | OXYGEN SATURATION: 100 % | DIASTOLIC BLOOD PRESSURE: 85 MMHG | SYSTOLIC BLOOD PRESSURE: 123 MMHG | BODY MASS INDEX: 34.36 KG/M2 | WEIGHT: 232 LBS | HEIGHT: 69 IN

## 2022-11-16 DIAGNOSIS — E66.01 SEVERE OBESITY (HCC): ICD-10-CM

## 2022-11-16 DIAGNOSIS — R33.9 URINARY RETENTION: Primary | ICD-10-CM

## 2022-11-16 DIAGNOSIS — N52.8 OTHER MALE ERECTILE DYSFUNCTION: ICD-10-CM

## 2022-11-16 DIAGNOSIS — F14.10 COCAINE ABUSE (HCC): ICD-10-CM

## 2022-11-16 DIAGNOSIS — R79.89 LOW TESTOSTERONE: ICD-10-CM

## 2022-11-16 PROCEDURE — G8752 SYS BP LESS 140: HCPCS | Performed by: UROLOGY

## 2022-11-16 PROCEDURE — 1111F DSCHRG MED/CURRENT MED MERGE: CPT | Performed by: UROLOGY

## 2022-11-16 PROCEDURE — 99214 OFFICE O/P EST MOD 30 MIN: CPT | Performed by: UROLOGY

## 2022-11-16 PROCEDURE — G9717 DOC PT DX DEP/BP F/U NT REQ: HCPCS | Performed by: UROLOGY

## 2022-11-16 PROCEDURE — G8417 CALC BMI ABV UP PARAM F/U: HCPCS | Performed by: UROLOGY

## 2022-11-16 PROCEDURE — G8754 DIAS BP LESS 90: HCPCS | Performed by: UROLOGY

## 2022-11-16 PROCEDURE — 3017F COLORECTAL CA SCREEN DOC REV: CPT | Performed by: UROLOGY

## 2022-11-16 PROCEDURE — 3074F SYST BP LT 130 MM HG: CPT | Performed by: UROLOGY

## 2022-11-16 PROCEDURE — G8427 DOCREV CUR MEDS BY ELIG CLIN: HCPCS | Performed by: UROLOGY

## 2022-11-16 PROCEDURE — 3078F DIAST BP <80 MM HG: CPT | Performed by: UROLOGY

## 2022-11-16 NOTE — PROGRESS NOTES
Chief Complaint   Patient presents with    Follow-up    Voiding Trial    Urinary Retention       PHQ-9 score is    Negative    Vitals:    11/16/22 1116   BP: 123/85   Pulse: 77   Temp: 97.8 °F (36.6 °C)   TempSrc: Temporal   SpO2: 100%   Weight: 232 lb (105.2 kg)   Height: 5' 9\" (1.753 m)   PainSc:   0 - No pain        1. \"Have you been to the ER, urgent care clinic since your last visit? Hospitalized since your last visit? \" No    2. \"Have you seen or consulted any other health care providers outside of the [address] [place] [address] since your last visit? \" No     3. For patients aged 39-70: Has the patient had a colonoscopy / FIT/ Cologuard? No      If the patient is female:    4. For patients aged 41-77: Has the patient had a mammogram within the past 2 years? No      5. For patients aged 21-65: Has the patient had a pap smear?  No

## 2022-11-16 NOTE — PROGRESS NOTES
HISTORY OF PRESENT ILLNESS    Simin Kapoor is a 60 y.o. male is here for voiding trial  Was seen yesterday with inability to void. We discussed his options. I put him on alpha blockers yesterday. He took the medication last night. Today I was able to fill him up and have him void. He is able to void out 90 cc with a good stream at 150. Not sure that that means he we will continue this path we will see him back in 2 weeks to reevaluate. He present denies fevers chills flank pain nausea vomiting weight loss or bone pain. He had no problem at the medication no side effects he is aware of    Instilled 150  Voided 90 cc    History:     11/9/22-Presented to ED for for  inability to urinate. He has a history of CVA, diabetes,, ED. He recently had open heart surgery and has had issues with urinating since then. He has required recatheterization. He was found to have 700 cc of urine in his bladder, shipman catheter was inserted  History:   He has been a patient of Dr. Vladislav Portillo. He has no previous history of kidney stones, urinary tract infection, family history of prostate cancer or other genitourinary malignancies    Erectile dysfunction, gradual deterioration since 2014 at which time patient had a CVA. He was prescribed tamsulosin  Additionally has had a problem with low testosterone in ED.   He is previously taken sildenafil appears to be working    Past Medical History:  PMHx (including negatives):  has a past medical history of Arteriosclerotic gangrene ([address] (7/15/2020), Atherosclerosis of arteries of extremities ([address] (7/15/2020), Cataract (7/15/2020), Coronary arteriosclerosis in native artery (7/15/2020), Diabetes mellitus type 2, controlled ([address] (7/15/2020), Diabetic mononeuropathy associated with type 2 diabetes mellitus ([address] (7/15/2020), Diabetic peripheral neuropathy ([address] (7/15/2020), EKG, abnormal (7/15/2020), Elevated liver enzymes (7/15/2020), Erectile dysfunction (7/15/2020), Essential hypertension (7/15/2020), Hepatitis, High cholesterol, Hyperlipidemia (7/15/2020), Hypertension, Pain in both lower legs (7/15/2020), Peripheral arterial occlusive disease ([facility] (7/15/2020), Peripheral circulatory disorder associated with type 2 diabetes mellitus ([facility] (7/15/2020), Spasm of back muscles (7/15/2020), and Ulcer of foot ([facility] (7/15/2020). PSurgHx:  has a past surgical history that includes ir stent placement; hx orthopaedic (Right); hx orthopaedic (Left); hx cataract removal (Bilateral); and hx wisdom teeth extraction. PSocHx:  reports that he has never smoked. He has never used smokeless tobacco. He reports that he does not currently use alcohol. He reports that he does not use drugs. Home Medications    Medication Sig Start Date End Date Taking? Authorizing Provider   HumuLIN N NPH U-100 Insulin 100 unit/mL injection INJECT EIGHT UNITS SUBCUTANEOUSLY ONCE DAILY AND INJECT sliding scale THREE TIMES DAILY BEFORE MEALS (max 12 UNITS PER DOSE AND 36 UNITS DAILY) 10/31/22  Yes Provider, Historical   FeroSuL 325 mg (65 mg iron) tablet Take 325 mg by mouth daily. 8/24/22  Yes Provider, Historical   tamsulosin (FLOMAX) 0.4 mg capsule Take 1 Capsule by mouth daily. 11/15/22  Yes Grabiel De La Fuente NP   tamsulosin (Flomax) 0.4 mg capsule Take 1 Capsule by mouth daily for 30 days. 11/15/22 12/15/22 Yes Grabiel De La Fuente NP   NovoLOG Flexpen U-100 Insulin 100 unit/mL (3 mL) inpn INJECT 2 UNITS PER 50u above 150mg/dl 3 times daily before meals as needed according to sliding scale. max of 36 units daily  Indications: type 2 diabetes mellitus 6/18/22  Yes Dejuan Kwan MD   hydroCHLOROthiazide (HYDRODIURIL) 25 mg tablet Take 1 Tablet by mouth daily. 6/18/22  Yes Dejuan Kwan MD   atorvastatin (LIPITOR) 40 mg tablet Take 1 Tablet by mouth daily.  6/6/22  Yes Jesse Nuno MD   liraglutide (Victoza 3-Alfredo) 0.6 mg/0.1 mL (18 mg/3 mL) pnij 1.8 mg by SubCUTAneous route daily for 270 days. 5/12/22 2/6/23 Yes Dane Victor MD   losartan (COZAAR) 100 mg tablet Take 1 Tablet by mouth daily. Indications: high blood pressure 1/22/22  Yes Alberto Cuba MD   labetaloL (NORMODYNE) 100 mg tablet TAKE ONE TABLET BY MOUTH TWICE DAILY FOR 90 DAYS 1/21/22  Yes Alberto Cuba MD   clopidogreL (PLAVIX) 75 mg tab Take 1 Tablet by mouth daily. 1/21/22  Yes Alberto Cuba MD   insulin glargine (LANTUS) 100 unit/mL injection 60 units at bed time 1/21/22  Yes Alberto Cuba MD   topiramate (TOPAMAX) 25 mg tablet Take 2 tablets by mouth 2 times daily. Patient not taking: No sig reported 8/15/22   Provider, Historical   sildenafil citrate (VIAGRA) 100 mg tablet Take 100 mg by mouth. Patient not taking: No sig reported 3/9/22   Provider, Historical   UltiCare Pen Needle 32 gauge x 5/32\" ndle USE TO GIVE INSULIN INJECTIONS FOUR TIMES DAILY  Patient not taking: No sig reported 10/31/22   Provider, Historical   omeprazole (PRILOSEC) 20 mg capsule  9/28/22   Provider, Historical   naloxone (NARCAN) 4 mg/actuation nasal spray Administer 1 spray into one nostril as needed for opioid reversal or respiratory depression. Call 911. If patient does not respond, or responds and then relapses, give additional doses every 2 to 3 minutes, alternating nostrils, until medical help arrives. Use as directed. Patient not taking: No sig reported 8/1/22   Provider, Historical   glucose blood VI test strips (Prodigy No Coding) strip TEST  GLUCOSE 4 times DAILY  Patient not taking: No sig reported 9/2/22   Shirley Baker MD   lidocaine (LIDODERM) 5 % APPLY 1 PATCH TO THE AFFECTED AREA FOR 12 HOURS (ON FOR 12 HOURS THEN OFF FOR 12 HOURS)  Patient not taking: No sig reported 2/2/22   Shirley Baker MD   aspirin 81 mg chewable tablet Take 1 Tablet by mouth daily.   Patient not taking: No sig reported 1/22/22   Alberto Cuba MD   glucose 4 gram chewable tablet Take 4 Tablets by mouth as needed for PRN Reason (Other) (hypoglycemia.). Patient not taking: No sig reported 1/21/22   Baljinder Langford MD   escitalopram oxalate (LEXAPRO) 10 mg tablet Take 1 Tablet by mouth daily. Patient not taking: No sig reported 1/22/22   Baljinder Langford MD   lancets (Prodigy Twist Top Lancet) 28 gauge misc by Does Not Apply route Before breakfast, lunch, dinner and at bedtime. Patient not taking: No sig reported 1/21/22   Baljinder Langford MD   Insulin Syringe-Needle U-100 0.3 mL 30 gauge x 5/16\" syrg Use to inject novolin in sliding scale as indicated 3 times per day before meals  Patient not taking: No sig reported 1/21/22   Baljinder Langford MD   alcohol swabs (Alcohol Pads) padm Use to check glucose 3 times a day before meals  Patient not taking: No sig reported 1/21/22   Baljinder Langford MD   traZODone (DESYREL) 50 mg tablet Take 1 Tablet by mouth nightly as needed for Sleep. Indications: major depressive disorder  Patient not taking: No sig reported 1/21/22   Baljinder Langford MD        Chronic Conditions Addressed Today       1. Other male erectile dysfunction     Overview      4/9/21 [facility]): erectile dysfunction, gradual deterioration since 2014 at which time patient had a CVA. The patient was given sildenafil 20 mg tablets which were not really effective. He was prescribed Trimix. 2. Severe obesity ([facility]    3. Low testosterone     Overview      3/22/21 serum T was 206  4/6/21 serum T was 323, FSH and LH normal.         4. Cocaine abuse ([facility]    5. Urinary retention - Primary       Review of Systems   Constitutional: Negative. HENT: Negative. Eyes: Negative. Respiratory: Negative. Cardiovascular: Negative. Gastrointestinal: Negative. Genitourinary: Negative. Musculoskeletal: Negative. Skin: Negative. Neurological: Negative. Endo/Heme/Allergies: Negative. Psychiatric/Behavioral: Negative.      Patient denies the symptoms of COVID-19 per routine screening guidelines. Physical Exam  Vitals reviewed. Constitutional:       General: He is not in acute distress. Appearance: He is not toxic-appearing. HENT:      Head: Normocephalic and atraumatic. Eyes:      Extraocular Movements: Extraocular movements intact. Pupils: Pupils are equal, round, and reactive to light. Cardiovascular:      Rate and Rhythm: Normal rate and regular rhythm. Heart sounds: Normal heart sounds. Pulmonary:      Effort: Pulmonary effort is normal.      Breath sounds: Normal breath sounds. Abdominal:      Palpations: Abdomen is soft. Tenderness: There is no abdominal tenderness. Musculoskeletal:      Right lower leg: No edema. Left lower leg: No edema. Skin:     General: Skin is warm and dry. Neurological:      General: No focal deficit present. Mental Status: He is alert. Psychiatric:         Mood and Affect: Mood normal.         Behavior: Behavior normal.   ASSESSMENT and PLAN  Diagnoses and all orders for this visit:    1. Urinary retention    2. Low testosterone    3. Cocaine abuse ([address]    4. Severe obesity ([address]    5. Other male erectile dysfunction       Keep him on Flomax see him back in a couple weeks hopefully his PSA will be back by then        Gladis Vickers may have a reminder for a \"due or due soon\" health maintenance. The patient has been encouraged to contact their primary care provider for follow-up on this health maintenance or other necessary and/or routine health screening.      Joon Martini NP

## 2022-11-18 ENCOUNTER — APPOINTMENT (OUTPATIENT)
Dept: CT IMAGING | Age: 57
End: 2022-11-18
Attending: STUDENT IN AN ORGANIZED HEALTH CARE EDUCATION/TRAINING PROGRAM
Payer: MEDICARE

## 2022-11-18 ENCOUNTER — OFFICE VISIT (OUTPATIENT)
Dept: UROLOGY | Age: 57
End: 2022-11-18
Payer: MEDICARE

## 2022-11-18 ENCOUNTER — HOSPITAL ENCOUNTER (EMERGENCY)
Age: 57
Discharge: HOME | End: 2022-11-18
Attending: STUDENT IN AN ORGANIZED HEALTH CARE EDUCATION/TRAINING PROGRAM
Payer: MEDICARE

## 2022-11-18 VITALS
SYSTOLIC BLOOD PRESSURE: 123 MMHG | HEIGHT: 69 IN | DIASTOLIC BLOOD PRESSURE: 78 MMHG | BODY MASS INDEX: 34.36 KG/M2 | HEART RATE: 98 BPM | WEIGHT: 232 LBS

## 2022-11-18 VITALS
HEART RATE: 114 BPM | RESPIRATION RATE: 20 BRPM | WEIGHT: 232 LBS | HEIGHT: 69 IN | DIASTOLIC BLOOD PRESSURE: 77 MMHG | OXYGEN SATURATION: 99 % | SYSTOLIC BLOOD PRESSURE: 118 MMHG | BODY MASS INDEX: 34.36 KG/M2 | TEMPERATURE: 97.8 F

## 2022-11-18 DIAGNOSIS — R31.9 HEMATURIA, UNSPECIFIED TYPE: Primary | ICD-10-CM

## 2022-11-18 LAB
ALBUMIN SERPL-MCNC: 3.7 G/DL (ref 3.5–5)
ALBUMIN/GLOB SERPL: 0.9 {RATIO} (ref 1.1–2.2)
ALP SERPL-CCNC: 93 U/L (ref 45–117)
ALT SERPL-CCNC: 27 U/L (ref 12–78)
ANION GAP SERPL CALC-SCNC: 6 MMOL/L (ref 5–15)
APPEARANCE UR: ABNORMAL
AST SERPL W P-5'-P-CCNC: 20 U/L (ref 15–37)
BACTERIA URNS QL MICRO: NEGATIVE /HPF
BASOPHILS # BLD: 0 K/UL (ref 0–0.1)
BASOPHILS NFR BLD: 1 % (ref 0–1)
BILIRUB SERPL-MCNC: 0.4 MG/DL (ref 0.2–1)
BILIRUB UR QL: NEGATIVE
BILIRUB UR QL: NORMAL
BUN SERPL-MCNC: 12 MG/DL (ref 6–20)
BUN/CREAT SERPL: 10 (ref 12–20)
CA-I BLD-MCNC: 9.7 MG/DL (ref 8.5–10.1)
CHLORIDE SERPL-SCNC: 104 MMOL/L (ref 97–108)
CO2 SERPL-SCNC: 29 MMOL/L (ref 21–32)
COLOR UR: ABNORMAL
CREAT SERPL-MCNC: 1.15 MG/DL (ref 0.7–1.3)
DIFFERENTIAL METHOD BLD: ABNORMAL
EOSINOPHIL # BLD: 0.2 K/UL (ref 0–0.4)
EOSINOPHIL NFR BLD: 4 % (ref 0–7)
ERYTHROCYTE [DISTWIDTH] IN BLOOD BY AUTOMATED COUNT: 11.6 % (ref 11.5–14.5)
GLOBULIN SER CALC-MCNC: 4.3 G/DL (ref 2–4)
GLUCOSE SERPL-MCNC: 163 MG/DL (ref 65–100)
GLUCOSE UR STRIP.AUTO-MCNC: NEGATIVE MG/DL
GLUCOSE UR-MCNC: NEGATIVE MG/DL
HCT VFR BLD AUTO: 35.5 % (ref 36.6–50.3)
HGB BLD-MCNC: 11.8 G/DL (ref 12.1–17)
HGB UR QL STRIP: ABNORMAL
IMM GRANULOCYTES # BLD AUTO: 0 K/UL (ref 0–0.04)
IMM GRANULOCYTES NFR BLD AUTO: 0 % (ref 0–0.5)
KETONES P FAST UR STRIP-MCNC: NORMAL MG/DL
KETONES UR QL STRIP.AUTO: NEGATIVE MG/DL
LEUKOCYTE ESTERASE UR QL STRIP.AUTO: NEGATIVE
LYMPHOCYTES # BLD: 1.2 K/UL (ref 0.8–3.5)
LYMPHOCYTES NFR BLD: 29 % (ref 12–49)
MCH RBC QN AUTO: 28.6 PG (ref 26–34)
MCHC RBC AUTO-ENTMCNC: 33.2 G/DL (ref 30–36.5)
MCV RBC AUTO: 86 FL (ref 80–99)
MONOCYTES # BLD: 0.4 K/UL (ref 0–1)
MONOCYTES NFR BLD: 8 % (ref 5–13)
NEUTS SEG # BLD: 2.5 K/UL (ref 1.8–8)
NEUTS SEG NFR BLD: 58 % (ref 32–75)
NITRITE UR QL STRIP.AUTO: NEGATIVE
NRBC # BLD: 0 K/UL (ref 0–0.01)
NRBC BLD-RTO: 0 PER 100 WBC
PH UR STRIP: 5 [PH] (ref 5–8)
PH UR STRIP: 6 [PH] (ref 4.6–8)
PLATELET # BLD AUTO: 376 K/UL (ref 150–400)
PMV BLD AUTO: 10.6 FL (ref 8.9–12.9)
POTASSIUM SERPL-SCNC: 3.4 MMOL/L (ref 3.5–5.1)
PROT SERPL-MCNC: 8 G/DL (ref 6.4–8.2)
PROT UR QL STRIP: 300
PROT UR STRIP-MCNC: 30 MG/DL
PVR POC: 6 CC
RBC # BLD AUTO: 4.13 M/UL (ref 4.1–5.7)
RBC #/AREA URNS HPF: >100 /HPF (ref 0–5)
SODIUM SERPL-SCNC: 139 MMOL/L (ref 136–145)
SP GR UR REFRACTOMETRY: 1.01 (ref 1–1.03)
SP GR UR STRIP: 1.03 (ref 1–1.03)
UA UROBILINOGEN AMB POC: NORMAL (ref 0.2–1)
UA: UC IF INDICATED,UAUC: ABNORMAL
URINALYSIS CLARITY POC: NORMAL
URINALYSIS COLOR POC: NORMAL
URINE BLOOD POC: NORMAL
URINE LEUKOCYTES POC: NORMAL
URINE NITRITES POC: NEGATIVE
UROBILINOGEN UR QL STRIP.AUTO: 0.1 EU/DL (ref 0.1–1)
WBC # BLD AUTO: 4.2 K/UL (ref 4.1–11.1)
WBC URNS QL MICRO: >100 /HPF (ref 0–4)

## 2022-11-18 PROCEDURE — G8754 DIAS BP LESS 90: HCPCS | Performed by: NURSE PRACTITIONER

## 2022-11-18 PROCEDURE — 99284 EMERGENCY DEPT VISIT MOD MDM: CPT

## 2022-11-18 PROCEDURE — G8417 CALC BMI ABV UP PARAM F/U: HCPCS | Performed by: NURSE PRACTITIONER

## 2022-11-18 PROCEDURE — 80053 COMPREHEN METABOLIC PANEL: CPT

## 2022-11-18 PROCEDURE — 99213 OFFICE O/P EST LOW 20 MIN: CPT | Performed by: NURSE PRACTITIONER

## 2022-11-18 PROCEDURE — 81003 URINALYSIS AUTO W/O SCOPE: CPT | Performed by: NURSE PRACTITIONER

## 2022-11-18 PROCEDURE — 3017F COLORECTAL CA SCREEN DOC REV: CPT | Performed by: NURSE PRACTITIONER

## 2022-11-18 PROCEDURE — 1111F DSCHRG MED/CURRENT MED MERGE: CPT | Performed by: NURSE PRACTITIONER

## 2022-11-18 PROCEDURE — 81001 URINALYSIS AUTO W/SCOPE: CPT

## 2022-11-18 PROCEDURE — 51798 US URINE CAPACITY MEASURE: CPT | Performed by: NURSE PRACTITIONER

## 2022-11-18 PROCEDURE — 3074F SYST BP LT 130 MM HG: CPT | Performed by: NURSE PRACTITIONER

## 2022-11-18 PROCEDURE — 3078F DIAST BP <80 MM HG: CPT | Performed by: NURSE PRACTITIONER

## 2022-11-18 PROCEDURE — G9717 DOC PT DX DEP/BP F/U NT REQ: HCPCS | Performed by: NURSE PRACTITIONER

## 2022-11-18 PROCEDURE — G8427 DOCREV CUR MEDS BY ELIG CLIN: HCPCS | Performed by: NURSE PRACTITIONER

## 2022-11-18 PROCEDURE — 85025 COMPLETE CBC W/AUTO DIFF WBC: CPT

## 2022-11-18 PROCEDURE — 74176 CT ABD & PELVIS W/O CONTRAST: CPT

## 2022-11-18 PROCEDURE — 87086 URINE CULTURE/COLONY COUNT: CPT

## 2022-11-18 PROCEDURE — G8752 SYS BP LESS 140: HCPCS | Performed by: NURSE PRACTITIONER

## 2022-11-18 RX ORDER — CIPROFLOXACIN 500 MG/1
500 TABLET ORAL 2 TIMES DAILY
Qty: 20 TABLET | Refills: 0 | Status: SHIPPED | OUTPATIENT
Start: 2022-11-18 | End: 2022-11-28

## 2022-11-18 NOTE — PROGRESS NOTES
HISTORY OF PRESENT ILLNESS    Allen Dowell is a 60 y.o. male presented to the office with complains painless gross hematuria. He originally presented to ED on 11/9/22 with urinary retention. He was found to have 700 cc of urine in his bladder, shipman catheter was inserted. He has no previous history of kidney stones, gross hematuria, urinary tract infection, family history of prostate cancer or other genitourinary malignancies   He has  Erectile dysfunction, gradual deterioration since 2014 at which time patient had a CVA. He had a successful  voiding trail on 11/17/22. He was able to void out 90 cc out of  150 cc with good stream.    Garcia Lighter reported that gross hematuria started yesterday right after his shipman catheter was removed. He denied any N/V chills, fever or difficulty voiding, or associated abdominal pain. His PVR today is 6cc. He has no blood clots in his urine    Chief Complaint   Patient presents with    Follow-up    Gross Hematuria            Past Medical History:  PMHx (including negatives):  has a past medical history of Arteriosclerotic gangrene ([address] (7/15/2020), Atherosclerosis of arteries of extremities ([address] (7/15/2020), Cataract (7/15/2020), Coronary arteriosclerosis in native artery (7/15/2020), Diabetes mellitus type 2, controlled ([address] (7/15/2020), Diabetic mononeuropathy associated with type 2 diabetes mellitus ([address] (7/15/2020), Diabetic peripheral neuropathy ([address] (7/15/2020), EKG, abnormal (7/15/2020), Elevated liver enzymes (7/15/2020), Erectile dysfunction (7/15/2020), Essential hypertension (7/15/2020), Hepatitis, High cholesterol, Hyperlipidemia (7/15/2020), Hypertension, Pain in both lower legs (7/15/2020), Peripheral arterial occlusive disease ([address] (7/15/2020), Peripheral circulatory disorder associated with type 2 diabetes mellitus ([address] (7/15/2020), Spasm of back muscles (7/15/2020), and Ulcer of foot ([address] (7/15/2020).    PSurgHx:  has a past surgical history that includes ir stent placement; hx orthopaedic (Right); hx orthopaedic (Left); hx cataract removal (Bilateral); and hx wisdom teeth extraction. PSocHx:  reports that he has never smoked. He has never used smokeless tobacco. He reports that he does not currently use alcohol. He reports that he does not use drugs. Home Medications    Medication Sig Start Date End Date Taking? Authorizing Provider   ciprofloxacin HCl (CIPRO) 500 mg tablet Take 1 Tablet by mouth two (2) times a day for 10 days. 11/18/22 11/28/22 Yes David De La Fuente NP   naloxone [facility]) 4 mg/actuation nasal spray Administer 1 spray into one nostril as needed for opioid reversal or respiratory depression. Call 911. If patient does not respond, or responds and then relapses, give additional doses every 2 to 3 minutes, alternating nostrils, until medical help arrives. Use as directed. 8/1/22  Yes Provider, Historical   HumuLIN N NPH U-100 Insulin 100 unit/mL injection INJECT EIGHT UNITS SUBCUTANEOUSLY ONCE DAILY AND INJECT sliding scale THREE TIMES DAILY BEFORE MEALS (max 12 UNITS PER DOSE AND 36 UNITS DAILY) 10/31/22  Yes Provider, Historical   FeroSuL 325 mg (65 mg iron) tablet Take 325 mg by mouth daily. 8/24/22  Yes Provider, Historical   tamsulosin (FLOMAX) 0.4 mg capsule Take 1 Capsule by mouth daily. 11/15/22  Yes David De La Fuente NP   tamsulosin (Flomax) 0.4 mg capsule Take 1 Capsule by mouth daily for 30 days. 11/15/22 12/15/22 Yes David De La Fuente NP   NovoLOG Flexpen U-100 Insulin 100 unit/mL (3 mL) inpn INJECT 2 UNITS PER 50u above 150mg/dl 3 times daily before meals as needed according to sliding scale. max of 36 units daily  Indications: type 2 diabetes mellitus 6/18/22  Yes Shanika Chung MD   hydroCHLOROthiazide (HYDRODIURIL) 25 mg tablet Take 1 Tablet by mouth daily. 6/18/22  Yes Shanika hCung MD   atorvastatin (LIPITOR) 40 mg tablet Take 1 Tablet by mouth daily.  6/6/22  Yes Nallely Nancy Nguyen MD   liraglutide (Victoza 3-Alfredo) 0.6 mg/0.1 mL (18 mg/3 mL) pnij 1.8 mg by SubCUTAneous route daily for 270 days. 5/12/22 2/6/23 Yes Bryan Huerta MD   losartan (COZAAR) 100 mg tablet Take 1 Tablet by mouth daily. Indications: high blood pressure 1/22/22  Yes Lolly Fleischer, MD   clopidogreL (PLAVIX) 75 mg tab Take 1 Tablet by mouth daily. 1/21/22  Yes Lolly Fleischer, MD   insulin glargine (LANTUS) 100 unit/mL injection 60 units at bed time 1/21/22  Yes Lolly Fleischer, MD   topiramate (TOPAMAX) 25 mg tablet Take 2 tablets by mouth 2 times daily. Patient not taking: No sig reported 8/15/22   Provider, Historical   sildenafil citrate (VIAGRA) 100 mg tablet Take 100 mg by mouth. Patient not taking: No sig reported 3/9/22   Provider, Historical   UltiCare Pen Needle 32 gauge x 5/32\" ndle USE TO GIVE INSULIN INJECTIONS FOUR TIMES DAILY  Patient not taking: No sig reported 10/31/22   Provider, Historical   omeprazole (PRILOSEC) 20 mg capsule  9/28/22   Provider, Historical   glucose blood VI test strips (Prodigy No Coding) strip TEST  GLUCOSE 4 times DAILY  Patient not taking: No sig reported 9/2/22   Jodee Cohn MD   lidocaine (LIDODERM) 5 % APPLY 1 PATCH TO THE AFFECTED AREA FOR 12 HOURS (ON FOR 12 HOURS THEN OFF FOR 12 HOURS)  Patient not taking: No sig reported 2/2/22   Jodee Cohn MD   aspirin 81 mg chewable tablet Take 1 Tablet by mouth daily. Patient not taking: No sig reported 1/22/22   Lolly Fleischer, MD   glucose 4 gram chewable tablet Take 4 Tablets by mouth as needed for PRN Reason (Other) (hypoglycemia.). Patient not taking: No sig reported 1/21/22   Lolly Fleischer, MD   escitalopram oxalate (LEXAPRO) 10 mg tablet Take 1 Tablet by mouth daily. Patient not taking: No sig reported 1/22/22   Lolly Fleischer, MD   lancets (Prodigy Twist Top Lancet) 28 gauge misc by Does Not Apply route Before breakfast, lunch, dinner and at bedtime.   Patient not taking: Reported on 11/18/2022 1/21/22   Evens Lara MD   Insulin Syringe-Needle U-100 0.3 mL 30 gauge x 5/16\" syrg Use to inject novolin in sliding scale as indicated 3 times per day before meals  Patient not taking: No sig reported 1/21/22   Evens Lara MD   alcohol swabs (Alcohol Pads) padm Use to check glucose 3 times a day before meals  Patient not taking: No sig reported 1/21/22   Evens Lara MD   labetaloL (NORMODYNE) 100 mg tablet TAKE ONE TABLET BY MOUTH TWICE DAILY FOR 90 DAYS 1/21/22   Evens Lara MD   traZODone (DESYREL) 50 mg tablet Take 1 Tablet by mouth nightly as needed for Sleep. Indications: major depressive disorder  Patient not taking: No sig reported 1/21/22   Evens Lara MD        Acute Diagnoses Addressed Today       Hematuria, unspecified type    -  Primary        Relevant Orders        AMB POC PVR, KYLEE,POST-VOID RES,US,NON-IMAGING (Completed)        AMB POC URINALYSIS DIP STICK AUTO W/O MICRO (Completed)        CT ABD PELV W WO CONT        CULTURE, URINE        URINE CYTOLOGY            Review of Systems   Constitutional: Negative. HENT: Negative. Eyes: Negative. Respiratory: Negative. Cardiovascular: Negative. Genitourinary:         Hematuria   Musculoskeletal: Negative. Skin: Negative. Neurological: Negative. Endo/Heme/Allergies: Negative. Psychiatric/Behavioral: Negative. Patient denies the symptoms of COVID-19 per routine screening guidelines. Physical Exam  HENT:      Head: Normocephalic. Eyes:      Pupils: Pupils are equal, round, and reactive to light. Cardiovascular:      Rate and Rhythm: Normal rate. Genitourinary:     Comments: Gross hematuria ,cherry color, no blood clots  Skin:     General: Skin is warm. Neurological:      General: No focal deficit present. Mental Status: He is alert. Psychiatric:         Mood and Affect: Mood normal.     ASSESSMENT and PLAN  1. Gross hematuria   Ct abdomen and pelvis  Rx Bactrium  Appointment with Dr. Jim Fernandez on 11/23/22 at 6 am               Simin Kapoor may have a reminder for a \"due or due soon\" health maintenance. The patient has been encouraged to contact their primary care provider for follow-up on this health maintenance or other necessary and/or routine health screening.      Roldan Huerta NP

## 2022-11-18 NOTE — ED TRIAGE NOTES
Post open heart Sx 10/236/22, urologist sent for post urinary cath complication. Bleeding with urination. Reports neg urine results for UTI, sent for CT and assessment.

## 2022-11-18 NOTE — DISCHARGE INSTRUCTIONS
Fill your Cipro prescription as ordered by , follow-up in the urology clinic in 1 week as needed, return to emergency department if you experience worsening marielena bleeding from penis, pain or inability to urinate.

## 2022-11-18 NOTE — ED PROVIDER NOTES
EMERGENCY DEPARTMENT HISTORY AND PHYSICAL EXAM      Date: 11/18/2022  Patient Name: Zen Newton    History of Presenting Illness     Chief Complaint   Patient presents with    Blood in Urine     Post cath complication       History Provided By: Patient    HPI: Zen Newton, 60 y.o. male with a past medical history significant  CABG, HTN  presents to the ED with cc of hematuria. Patient had indwelling Copeland from CABG, removed 2 days ago, since that time has had intermittent urinary bleeding, denies any pain or burning in urination no abdominal pain no nausea vomiting. Patient was in  clinic, was told to come to ED for CT abd/pelvis, was prescribed cipro. There are no other complaints, changes, or physical findings at this time. PCP: Other, MD Sam    No current facility-administered medications on file prior to encounter. Current Outpatient Medications on File Prior to Encounter   Medication Sig Dispense Refill    ciprofloxacin HCl (CIPRO) 500 mg tablet Take 1 Tablet by mouth two (2) times a day for 10 days. 20 Tablet 0    topiramate (TOPAMAX) 25 mg tablet Take 2 tablets by mouth 2 times daily. (Patient not taking: No sig reported)      sildenafil citrate (VIAGRA) 100 mg tablet Take 100 mg by mouth. (Patient not taking: No sig reported)      UltiCare Pen Needle 32 gauge x 5/32\" ndle USE TO GIVE INSULIN INJECTIONS FOUR TIMES DAILY (Patient not taking: No sig reported)      omeprazole (PRILOSEC) 20 mg capsule  (Patient not taking: No sig reported)      naloxone (NARCAN) 4 mg/actuation nasal spray Administer 1 spray into one nostril as needed for opioid reversal or respiratory depression. Call 911. If patient does not respond, or responds and then relapses, give additional doses every 2 to 3 minutes, alternating nostrils, until medical help arrives. Use as directed.       HumuLIN N NPH U-100 Insulin 100 unit/mL injection INJECT EIGHT UNITS SUBCUTANEOUSLY ONCE DAILY AND INJECT sliding scale THREE TIMES DAILY BEFORE MEALS (max 12 UNITS PER DOSE AND 36 UNITS DAILY)      FeroSuL 325 mg (65 mg iron) tablet Take 325 mg by mouth daily. tamsulosin (FLOMAX) 0.4 mg capsule Take 1 Capsule by mouth daily. 90 Capsule 3    tamsulosin (Flomax) 0.4 mg capsule Take 1 Capsule by mouth daily for 30 days. 30 Capsule 0    glucose blood VI test strips (Prodigy No Coding) strip TEST  GLUCOSE 4 times DAILY (Patient not taking: No sig reported) 120 Strip 3    NovoLOG Flexpen U-100 Insulin 100 unit/mL (3 mL) inpn INJECT 2 UNITS PER 50u above 150mg/dl 3 times daily before meals as needed according to sliding scale. max of 36 units daily  Indications: type 2 diabetes mellitus 12 mL 3    hydroCHLOROthiazide (HYDRODIURIL) 25 mg tablet Take 1 Tablet by mouth daily. 90 Tablet 0    atorvastatin (LIPITOR) 40 mg tablet Take 1 Tablet by mouth daily. 90 Tablet 0    liraglutide (Victoza 3-Alfredo) 0.6 mg/0.1 mL (18 mg/3 mL) pnij 1.8 mg by SubCUTAneous route daily for 270 days. 162 mg 0    lidocaine (LIDODERM) 5 % APPLY 1 PATCH TO THE AFFECTED AREA FOR 12 HOURS (ON FOR 12 HOURS THEN OFF FOR 12 HOURS) (Patient not taking: No sig reported) 30 Patch 4    losartan (COZAAR) 100 mg tablet Take 1 Tablet by mouth daily. Indications: high blood pressure 30 Tablet 0    aspirin 81 mg chewable tablet Take 1 Tablet by mouth daily. (Patient not taking: No sig reported) 30 Tablet 0    glucose 4 gram chewable tablet Take 4 Tablets by mouth as needed for PRN Reason (Other) (hypoglycemia.). (Patient not taking: No sig reported) 100 Tablet 0    escitalopram oxalate (LEXAPRO) 10 mg tablet Take 1 Tablet by mouth daily. (Patient not taking: No sig reported) 30 Tablet 0    lancets (Prodigy Twist Top Lancet) 28 gauge misc by Does Not Apply route Before breakfast, lunch, dinner and at bedtime.  (Patient not taking: Reported on 11/18/2022) 120 Lancet 0    Insulin Syringe-Needle U-100 0.3 mL 30 gauge x 5/16\" syrg Use to inject novolin in sliding scale as indicated 3 times per day before meals (Patient not taking: No sig reported) 100 Each 0    alcohol swabs (Alcohol Pads) padm Use to check glucose 3 times a day before meals (Patient not taking: No sig reported) 100 Pad 11    labetaloL (NORMODYNE) 100 mg tablet TAKE ONE TABLET BY MOUTH TWICE DAILY FOR 90 DAYS 180 Tablet 2    clopidogreL (PLAVIX) 75 mg tab Take 1 Tablet by mouth daily. 90 Tablet 2    insulin glargine (LANTUS) 100 unit/mL injection 60 units at bed time 1 mL 0    traZODone (DESYREL) 50 mg tablet Take 1 Tablet by mouth nightly as needed for Sleep.  Indications: major depressive disorder (Patient not taking: No sig reported) 30 Tablet 0       Past History     Past Medical History:  Past Medical History:   Diagnosis Date    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    Atherosclerosis of arteries of extremities ([address] 7/15/2020    Cataract 7/15/2020    Coronary arteriosclerosis in native artery 7/15/2020    Diabetes mellitus type 2, controlled ([address] 7/15/2020    Diabetic mononeuropathy associated with type 2 diabetes mellitus ([address] 7/15/2020    Diabetic peripheral neuropathy ([address] 7/15/2020    EKG, abnormal 7/15/2020    Elevated liver enzymes 7/15/2020    Erectile dysfunction 7/15/2020    Essential hypertension 7/15/2020    Hepatitis     High cholesterol     Hyperlipidemia 7/15/2020    Hypertension     Pain in both lower legs 7/15/2020    Peripheral arterial occlusive disease ([address] 7/15/2020    Peripheral circulatory disorder associated with type 2 diabetes mellitus ([address] 7/15/2020    Spasm of back muscles 7/15/2020    Ulcer of foot ([address] 7/15/2020       Past Surgical History:  Past Surgical History:   Procedure Laterality Date    HX CATARACT REMOVAL Bilateral     HX ORTHOPAEDIC Right     stents placed    HX ORTHOPAEDIC Left     HX WISDOM TEETH EXTRACTION      IR STENT PLACEMENT      Right Leg       Family History:  Family History   Problem Relation Age of Onset    Diabetes Mother     Heart Disease Mother     Diabetes Father Diabetes Sister     Diabetes Brother        Social History:  Social History     Tobacco Use    Smoking status: Never    Smokeless tobacco: Never   Vaping Use    Vaping Use: Never used   Substance Use Topics    Alcohol use: Not Currently    Drug use: Never       Allergies:  No Known Allergies      Review of Systems     Review of Systems   Constitutional:  Negative for activity change, appetite change, chills and fever. HENT:  Negative for congestion, sore throat and trouble swallowing. Eyes:  Negative for photophobia and visual disturbance. Respiratory:  Negative for cough, chest tightness and shortness of breath. Cardiovascular:  Negative for chest pain and palpitations. Gastrointestinal:  Negative for abdominal pain and nausea. Genitourinary:  Positive for hematuria. Negative for dysuria, flank pain, frequency and penile swelling. Musculoskeletal:  Negative for arthralgias and neck pain. Skin:  Negative for color change and pallor. Neurological:  Negative for dizziness, weakness, numbness and headaches. Physical Exam     Physical Exam  Vitals and nursing note reviewed. Constitutional:       Appearance: Normal appearance. He is normal weight. HENT:      Head: Normocephalic and atraumatic. Nose: Nose normal.      Mouth/Throat:      Mouth: Mucous membranes are moist.   Eyes:      Extraocular Movements: Extraocular movements intact. Pupils: Pupils are equal, round, and reactive to light. Cardiovascular:      Rate and Rhythm: Normal rate and regular rhythm. Pulses: Normal pulses. Heart sounds: Normal heart sounds. Pulmonary:      Effort: Pulmonary effort is normal.      Breath sounds: Normal breath sounds. Abdominal:      General: Abdomen is flat. Bowel sounds are normal.      Palpations: Abdomen is soft. Genitourinary:     Comments: Koolaid colored urine, no gross clots  Musculoskeletal:         General: No swelling or tenderness. Normal range of motion. Cervical back: Normal range of motion and neck supple. Skin:     General: Skin is warm and dry. Capillary Refill: Capillary refill takes less than 2 seconds. Neurological:      General: No focal deficit present. Mental Status: He is alert and oriented to person, place, and time. Cranial Nerves: No cranial nerve deficit. Sensory: No sensory deficit. Motor: No weakness. Psychiatric:         Mood and Affect: Mood normal.         Behavior: Behavior normal.       Diagnostic Study Results     Labs -     Recent Results (from the past 12 hour(s))   AMB POC URINALYSIS DIP STICK AUTO W/O MICRO    Collection Time: 11/18/22 10:03 AM   Result Value Ref Range    Color (UA POC) Brown     Clarity (UA POC) Turbid     Glucose (UA POC) Negative Negative mg/dL    Bilirubin (UA POC) Moderate Negative    Ketones (UA POC) Trace Negative    Specific gravity (UA POC) 1.030 1.001 - 1.035    Blood (UA POC) Large Negative    pH (UA POC) 6.0 4.6 - 8.0    Protein (UA POC) 300 Negative    Urobilinogen (UA POC) 1 mg/dL 0.2 - 1    Nitrites (UA POC) Negative Negative    Leukocyte esterase (UA POC) Trace Negative   AMB POC PVR, KYLEE,POST-VOID RES,US,NON-IMAGING    Collection Time: 11/18/22 10:18 AM   Result Value Ref Range    PVR 6 cc   CBC WITH AUTOMATED DIFF    Collection Time: 11/18/22 11:02 AM   Result Value Ref Range    WBC 4.2 4.1 - 11.1 K/uL    RBC 4.13 4.10 - 5.70 M/uL    HGB 11.8 (L) 12.1 - 17.0 g/dL    HCT 35.5 (L) 36.6 - 50.3 %    MCV 86.0 80.0 - 99.0 FL    MCH 28.6 26.0 - 34.0 PG    MCHC 33.2 30.0 - 36.5 g/dL    RDW 11.6 11.5 - 14.5 %    PLATELET 414 236 - 589 K/uL    MPV 10.6 8.9 - 12.9 FL    NRBC 0.0 0.0  WBC    ABSOLUTE NRBC 0.00 0.00 - 0.01 K/uL    NEUTROPHILS 58 32 - 75 %    LYMPHOCYTES 29 12 - 49 %    MONOCYTES 8 5 - 13 %    EOSINOPHILS 4 0 - 7 %    BASOPHILS 1 0 - 1 %    IMMATURE GRANULOCYTES 0 0 - 0.5 %    ABS. NEUTROPHILS 2.5 1.8 - 8.0 K/UL    ABS. LYMPHOCYTES 1.2 0.8 - 3.5 K/UL    ABS. MONOCYTES 0.4 0.0 - 1.0 K/UL    ABS. EOSINOPHILS 0.2 0.0 - 0.4 K/UL    ABS. BASOPHILS 0.0 0.0 - 0.1 K/UL    ABS. IMM. GRANS. 0.0 0.00 - 0.04 K/UL    DF AUTOMATED     METABOLIC PANEL, COMPREHENSIVE    Collection Time: 11/18/22 11:02 AM   Result Value Ref Range    Sodium 139 136 - 145 mmol/L    Potassium 3.4 (L) 3.5 - 5.1 mmol/L    Chloride 104 97 - 108 mmol/L    CO2 29 21 - 32 mmol/L    Anion gap 6 5 - 15 mmol/L    Glucose 163 (H) 65 - 100 mg/dL    BUN 12 6 - 20 mg/dL    Creatinine 1.15 0.70 - 1.30 mg/dL    BUN/Creatinine ratio 10 (L) 12 - 20      eGFR >60 >60 ml/min/1.73m2    Calcium 9.7 8.5 - 10.1 mg/dL    Bilirubin, total 0.4 0.2 - 1.0 mg/dL    AST (SGOT) 20 15 - 37 U/L    ALT (SGPT) 27 12 - 78 U/L    Alk. phosphatase 93 45 - 117 U/L    Protein, total 8.0 6.4 - 8.2 g/dL    Albumin 3.7 3.5 - 5.0 g/dL    Globulin 4.3 (H) 2.0 - 4.0 g/dL    A-G Ratio 0.9 (L) 1.1 - 2.2         Radiologic Studies -   @lastxrresult@  CT Results  (Last 48 hours)      None          CXR Results  (Last 48 hours)      None              Medical Decision Making   I am the first provider for this patient. I reviewed the vital signs, available nursing notes, past medical history, past surgical history, family history and social history. Vital Signs-Reviewed the patient's vital signs. Patient Vitals for the past 12 hrs:   Temp Pulse Resp BP SpO2   11/18/22 1025 97.8 °F (36.6 °C) (!) 114 20 118/77 99 %       Records Reviewed: Nursing Notes    The patient presents with hematuria with a differential diagnosis of hemorhhagic cystitis, renal stone, ureteral irritation, cystil neoplasm      Provider Notes (Medical Decision Making):     MDM     56yom, CAD, CABG, recent Copeland withdrawal postop, presents emergency department for hematuria. Patient was seen at  clinic, was told to come to ED for CT scan. Physical exam shows well-appearing male no distress, stable vitals, abdomen soft nontender nondistended.     Basic lab work, UA sent, will obtain CT abdomen pelvis noncontrast.  Patient had Cipro prescribed by  clinic, patient in a big rush would like to go home states he has many things that he needs to take care of today does not want to wait for labs or CT scan results, states his  clinic and follow-up with results. We will fill prescription for Cipro, will discharge patient home. ED Course:   Initial assessment performed. The patients presenting problems have been discussed, and they are in agreement with the care plan formulated and outlined with them. I have encouraged them to ask questions as they arise throughout their visit. PROCEDURES  Procedures         PLAN:  1. Current Discharge Medication List        2. Follow-up Information       Follow up With Specialties Details Why Isabel Anton MD Urology In 1 week As needed [address] [phone]            Return to ED if worse     Diagnosis     Clinical Impression:   1.  Hematuria, unspecified type

## 2022-11-18 NOTE — PROGRESS NOTES
Chief Complaint   Patient presents with    Follow-up    Gross Hematuria       1. Have you been to the ER, urgent care clinic since your last visit? Hospitalized since your last visit? No    2. Have you seen or consulted any other health care providers outside of the [address] [place] [address] since your last visit? Include any pap smears or colon screening.  No      Visit Vitals  /78 (BP 1 Location: Left upper arm, BP Patient Position: Sitting, BP Cuff Size: Adult long)   Pulse 98   Ht 5' 9\" (1.753 m)   Wt 232 lb (105.2 kg)   BMI 34.26 kg/m²

## 2022-11-19 LAB
BACTERIA SPEC CULT: NORMAL
SPECIAL REQUESTS,SREQ: NORMAL

## 2022-11-20 LAB — BACTERIA UR CULT: NO GROWTH

## 2022-11-21 LAB
CYTOLOGY SPEC DOC CYTO: NORMAL
PATH REPORT.GROSS SPEC: NORMAL
PATH REPORT.SITE OF ORIGIN SPEC: NORMAL
PATHOLOGIST NAME: NORMAL
PERFORMED BY, 191120: NORMAL

## 2022-11-27 PROBLEM — R31.0 GROSS HEMATURIA: Status: ACTIVE | Noted: 2022-11-27

## 2022-11-28 NOTE — PROGRESS NOTES
HISTORY OF PRESENT ILLNESS    Nii Velásquez is a 59 y.o. male is here for follow up on gross hematuria. He originally presented to ED with urinary retention. He had successful voiding trail. He came next day to the office with gross hematuria. Ct scan is negative  Urine culture  and cytology are negative. He was put on cipro    Today he denies gross hematuria, frequency, urgency, wakes up 2 per night. He finished his cipr  Urine dipstick is negative for infection and blood. Also desires a refill on his Trimix is been very effective for him and he is very happy. Fortunately prostatitis etc. he is much much improved little to no symptoms from that episode after proper treatment     PVR is 85 cc  Past Medical History:  PMHx (including negatives):  has a past medical history of Arteriosclerotic gangrene ([address] (7/15/2020), Atherosclerosis of arteries of extremities ([address] (7/15/2020), Cataract (7/15/2020), Coronary arteriosclerosis in native artery (7/15/2020), Diabetes mellitus type 2, controlled ([address] (7/15/2020), Diabetic mononeuropathy associated with type 2 diabetes mellitus ([address] (7/15/2020), Diabetic peripheral neuropathy ([address] (7/15/2020), EKG, abnormal (7/15/2020), Elevated liver enzymes (7/15/2020), Erectile dysfunction (7/15/2020), Essential hypertension (7/15/2020), Hepatitis, High cholesterol, Hyperlipidemia (7/15/2020), Hypertension, Pain in both lower legs (7/15/2020), Peripheral arterial occlusive disease ([address] (7/15/2020), Peripheral circulatory disorder associated with type 2 diabetes mellitus ([address] (7/15/2020), Spasm of back muscles (7/15/2020), and Ulcer of foot ([address] (7/15/2020). PSurgHx:  has a past surgical history that includes ir stent placement; hx orthopaedic (Right); hx orthopaedic (Left); hx cataract removal (Bilateral); and hx wisdom teeth extraction. PSocHx:  reports that he has never smoked. He has never used smokeless tobacco. He reports that he does not currently use alcohol.  He reports that he does not use drugs. Home Medications    Medication Sig Start Date End Date Taking? Authorizing Provider   HumuLIN N NPH U-100 Insulin 100 unit/mL injection INJECT EIGHT UNITS SUBCUTANEOUSLY ONCE DAILY AND INJECT sliding scale THREE TIMES DAILY BEFORE MEALS (max 12 UNITS PER DOSE AND 36 UNITS DAILY) 10/31/22  Yes Provider, Historical   FeroSuL 325 mg (65 mg iron) tablet Take 325 mg by mouth daily. 8/24/22  Yes Provider, Historical   tamsulosin (FLOMAX) 0.4 mg capsule Take 1 Capsule by mouth daily. 11/15/22  Yes Boguslavsky, Azalea Jeans, NP   tamsulosin (Flomax) 0.4 mg capsule Take 1 Capsule by mouth daily for 30 days. 11/15/22 12/15/22 Yes Boguslavsky, Azalea Jeans, NP   NovoLOG Flexpen U-100 Insulin 100 unit/mL (3 mL) inpn INJECT 2 UNITS PER 50u above 150mg/dl 3 times daily before meals as needed according to sliding scale. max of 36 units daily  Indications: type 2 diabetes mellitus 6/18/22  Yes Allen Byrd MD   hydroCHLOROthiazide (HYDRODIURIL) 25 mg tablet Take 1 Tablet by mouth daily. 6/18/22  Yes Allen Byrd MD   atorvastatin (LIPITOR) 40 mg tablet Take 1 Tablet by mouth daily. 6/6/22  Yes Domingo Schrader MD   liraglutide (Victoza 3-Alfredo) 0.6 mg/0.1 mL (18 mg/3 mL) pnij 1.8 mg by SubCUTAneous route daily for 270 days. 5/12/22 2/6/23 Yes Domingo Schrader MD   losartan (COZAAR) 100 mg tablet Take 1 Tablet by mouth daily. Indications: high blood pressure 1/22/22  Yes Baljinder Langford MD   aspirin 81 mg chewable tablet Take 1 Tablet by mouth daily. 1/22/22  Yes Baljinder Langford MD   labetaloL (NORMODYNE) 100 mg tablet TAKE ONE TABLET BY MOUTH TWICE DAILY FOR 90 DAYS 1/21/22  Yes Baljinder Langford MD   clopidogreL (PLAVIX) 75 mg tab Take 1 Tablet by mouth daily.  1/21/22  Yes Baljinder Langford MD   insulin glargine (LANTUS) 100 unit/mL injection 60 units at bed time 1/21/22  Yes Baljinder Langford MD   topiramate (TOPAMAX) 25 mg tablet Take 2 tablets by mouth 2 times daily. Patient not taking: No sig reported 8/15/22   Provider, Historical   sildenafil citrate (VIAGRA) 100 mg tablet Take 100 mg by mouth. Patient not taking: No sig reported 3/9/22   Provider, Historical   UltiCare Pen Needle 32 gauge x 5/32\" ndle USE TO GIVE INSULIN INJECTIONS FOUR TIMES DAILY  Patient not taking: No sig reported 10/31/22   Provider, Historical   omeprazole (PRILOSEC) 20 mg capsule  9/28/22   Provider, Historical   naloxone (NARCAN) 4 mg/actuation nasal spray Administer 1 spray into one nostril as needed for opioid reversal or respiratory depression. Call 911. If patient does not respond, or responds and then relapses, give additional doses every 2 to 3 minutes, alternating nostrils, until medical help arrives. Use as directed. 8/1/22   Provider, Historical   glucose blood VI test strips (Prodigy No Coding) strip TEST  GLUCOSE 4 times DAILY  Patient not taking: No sig reported 9/2/22   Caron Guerrero MD   lidocaine (LIDODERM) 5 % APPLY 1 PATCH TO THE AFFECTED AREA FOR 12 HOURS (ON FOR 12 HOURS THEN OFF FOR 12 HOURS)  Patient not taking: No sig reported 2/2/22   Caron Guerrero MD   glucose 4 gram chewable tablet Take 4 Tablets by mouth as needed for PRN Reason (Other) (hypoglycemia.). Patient not taking: No sig reported 1/21/22   Renetta Reynoso MD   escitalopram oxalate (LEXAPRO) 10 mg tablet Take 1 Tablet by mouth daily. Patient not taking: No sig reported 1/22/22   Renetta Reynoso MD   lancets (Prodigy Twist Top Lancet) 28 gauge misc by Does Not Apply route Before breakfast, lunch, dinner and at bedtime.   Patient not taking: Reported on 11/18/2022 1/21/22   Renetta Reynoso MD   Insulin Syringe-Needle U-100 0.3 mL 30 gauge x 5/16\" syrg Use to inject novolin in sliding scale as indicated 3 times per day before meals  Patient not taking: No sig reported 1/21/22   Renetta Reynoso MD   alcohol swabs (Alcohol Pads) padm Use to check glucose 3 times a day before meals  Patient not taking: No sig reported 1/21/22   Donya Galvan MD   traZODone (DESYREL) 50 mg tablet Take 1 Tablet by mouth nightly as needed for Sleep. Indications: major depressive disorder  Patient not taking: No sig reported 1/21/22   Donya Galvan MD        Chronic Conditions Addressed Today       1. Diabetic peripheral neuropathy ([facility]    2. Other male erectile dysfunction - Primary     Overview      4/9/21 Jordanapaula Cara): erectile dysfunction, gradual deterioration since 2014 at which time patient had a CVA. The patient was given sildenafil 20 mg tablets which were not really effective. He was prescribed Trimix. 3. Low testosterone     Overview      3/22/21 serum T was 206  4/6/21 serum T was 323, FSH and LH normal.         4. Urinary retention     Overview      He originally presented to ED on 11/9/22 with urinary retention. He was found to have 700 cc of urine in his bladder, shipman catheter was inserted. He has no previous history of kidney stones, gross hematuria, urinary tract infection, family history of prostate cancer or other genitourinary malignancies   He has  Erectile dysfunction, gradual deterioration since 2014 at which time patient had a CVA. He had a successful  voiding trail on 11/17/22. He was able to void out 90 cc out of  150 cc with good stream.          Relevant Orders     AMB POC URINALYSIS DIP STICK AUTO W/O MICRO (Completed)     AMB POC PVR, KYLEE,POST-VOID RES,US,NON-IMAGING (Completed)    5. Gross hematuria     Overview      11/18/22-he reported that gross hematuria started yesterday right after his shipman catheter was removed. He denied any N/V chills, fever or difficulty voiding, or associated abdominal pain. CT scan unremarkable           Relevant Orders     AMB POC URINALYSIS DIP STICK AUTO W/O MICRO (Completed)     AMB POC PVR, KYLEE,POST-VOID RES,US,NON-IMAGING (Completed)       Review of Systems   Constitutional: Negative. HENT: Negative. Eyes: Negative. Respiratory: Negative. Cardiovascular: Negative. Gastrointestinal: Negative. Genitourinary: Negative. Musculoskeletal: Negative. Skin: Negative. Neurological: Negative. Endo/Heme/Allergies: Negative. Psychiatric/Behavioral: Negative. Patient denies the symptoms of COVID-19 per routine screening guidelines. Physical Exam  Vitals reviewed. Constitutional:       General: He is not in acute distress. Appearance: He is not toxic-appearing. HENT:      Head: Normocephalic and atraumatic. Eyes:      Extraocular Movements: Extraocular movements intact. Pupils: Pupils are equal, round, and reactive to light. Cardiovascular:      Rate and Rhythm: Normal rate and regular rhythm. Heart sounds: Normal heart sounds. Pulmonary:      Effort: Pulmonary effort is normal.      Breath sounds: Normal breath sounds. Abdominal:      Palpations: Abdomen is soft. Tenderness: There is no abdominal tenderness. Musculoskeletal:      Right lower leg: No edema. Left lower leg: No edema. Skin:     General: Skin is warm and dry. Neurological:      General: No focal deficit present. Mental Status: He is alert. Psychiatric:         Mood and Affect: Mood normal.         Behavior: Behavior normal.   ASSESSMENT and PLAN  Diagnoses and all orders for this visit:    1. Other male erectile dysfunction    2. Urinary retention  -     AMB POC URINALYSIS DIP STICK AUTO W/O MICRO  -     AMB POC PVR, KYLEE,POST-VOID RES,US,NON-IMAGING    3. Gross hematuria  -     AMB POC URINALYSIS DIP STICK AUTO W/O MICRO  -     AMB POC PVR, KYLEE,POST-VOID RES,US,NON-IMAGING    4. Low testosterone    5. Diabetic peripheral neuropathy ([address]     Trimix refilled his prescription for prostatitis as needed see me back in 6 months          Marybeth Funes may have a reminder for a \"due or due soon\" health maintenance.  The patient has been encouraged to contact their primary care provider for follow-up on this health maintenance or other necessary and/or routine health screening.      Evaristo Kong MD

## 2022-11-30 ENCOUNTER — OFFICE VISIT (OUTPATIENT)
Dept: UROLOGY | Age: 57
End: 2022-11-30
Payer: MEDICARE

## 2022-11-30 VITALS
SYSTOLIC BLOOD PRESSURE: 149 MMHG | HEIGHT: 69 IN | HEART RATE: 85 BPM | WEIGHT: 241.1 LBS | BODY MASS INDEX: 35.71 KG/M2 | DIASTOLIC BLOOD PRESSURE: 83 MMHG

## 2022-11-30 DIAGNOSIS — E11.42 DIABETIC PERIPHERAL NEUROPATHY (HCC): ICD-10-CM

## 2022-11-30 DIAGNOSIS — N52.8 OTHER MALE ERECTILE DYSFUNCTION: Primary | ICD-10-CM

## 2022-11-30 DIAGNOSIS — R33.9 URINARY RETENTION: ICD-10-CM

## 2022-11-30 DIAGNOSIS — R31.0 GROSS HEMATURIA: ICD-10-CM

## 2022-11-30 DIAGNOSIS — R79.89 LOW TESTOSTERONE: ICD-10-CM

## 2022-11-30 LAB
BILIRUB UR QL: NEGATIVE
GLUCOSE UR-MCNC: NEGATIVE MG/DL
KETONES P FAST UR STRIP-MCNC: NEGATIVE MG/DL
PH UR STRIP: 7.5 [PH] (ref 4.6–8)
PROT UR QL STRIP: NEGATIVE
PVR POC: 85 CC
SP GR UR STRIP: 1.02 (ref 1–1.03)
UA UROBILINOGEN AMB POC: NORMAL (ref 0.2–1)
URINALYSIS CLARITY POC: CLEAR
URINALYSIS COLOR POC: YELLOW
URINE BLOOD POC: NEGATIVE
URINE LEUKOCYTES POC: NEGATIVE
URINE NITRITES POC: NEGATIVE

## 2022-11-30 NOTE — PROGRESS NOTES
Chief Complaint   Patient presents with    Follow-up    Hematuria       1. Have you been to the ER, urgent care clinic since your last visit? Hospitalized since your last visit? No    2. Have you seen or consulted any other health care providers outside of the [address] [place] [address] since your last visit? Include any pap smears or colon screening.  No      Visit Vitals  BP (!) 149/83 (BP 1 Location: Left upper arm, BP Patient Position: Sitting, BP Cuff Size: Large adult)   Pulse 85   Ht 5' 9\" (1.753 m)   Wt 241 lb 1.6 oz (109.4 kg)   BMI 35.60 kg/m²

## 2022-12-01 PROBLEM — Z86.73 STATUS POST CVA: Status: ACTIVE | Noted: 2022-12-01

## 2022-12-07 ENCOUNTER — TELEPHONE (OUTPATIENT)
Dept: UROLOGY | Age: 57
End: 2022-12-07

## 2022-12-07 NOTE — TELEPHONE ENCOUNTER
Spoke to patient, he wants to know how long he needs to continue flomax. I told him 90days with 3 refills.     Patient states meds are making him dizzy, please advise

## 2022-12-07 NOTE — TELEPHONE ENCOUNTER
Patient called to clarify how long he is supposed to be taking TAMSULOSIN, I see two prescriptions in, one for 30 days with 0 refills, and another for 90 days with 3 refills, patient has a 90 day supply with 3 refills so he would like some clarification    Please advise

## 2022-12-08 NOTE — TELEPHONE ENCOUNTER
I spoke to patient, gave update, he did state that [facility] had called him and let him know she was awaiting directions from Samy Kya.  As soon as I hear anything ill give him a call back.

## 2022-12-08 NOTE — TELEPHONE ENCOUNTER
I spoke to the patient today and discussed Flomax. The patient will take Flomax in the evening and see if he still has dizziness. He will call if it is a problem.

## 2022-12-08 NOTE — TELEPHONE ENCOUNTER
Patient called stating he has not heard anything back and he needs to know what to do in regards to the [facility]

## 2023-02-06 ENCOUNTER — HOSPITAL ENCOUNTER (EMERGENCY)
Age: 58
Discharge: HOME | End: 2023-02-06
Attending: EMERGENCY MEDICINE
Payer: MEDICARE

## 2023-02-06 VITALS
BODY MASS INDEX: 35.55 KG/M2 | DIASTOLIC BLOOD PRESSURE: 88 MMHG | HEART RATE: 99 BPM | HEIGHT: 69 IN | TEMPERATURE: 97.5 F | OXYGEN SATURATION: 96 % | RESPIRATION RATE: 20 BRPM | SYSTOLIC BLOOD PRESSURE: 135 MMHG | WEIGHT: 240 LBS

## 2023-02-06 DIAGNOSIS — N48.39: Primary | ICD-10-CM

## 2023-02-06 PROCEDURE — 54220 IRRG CRPRA CAVRNOSA PRIAPISM: CPT

## 2023-02-06 PROCEDURE — 74011250636 HC RX REV CODE- 250/636: Performed by: EMERGENCY MEDICINE

## 2023-02-06 PROCEDURE — 99282 EMERGENCY DEPT VISIT SF MDM: CPT

## 2023-02-06 RX ORDER — PHENYLEPHRINE HCL IN 0.9% NACL 1 MG/10 ML
200 SYRINGE (ML) INTRAVENOUS ONCE
Status: COMPLETED | OUTPATIENT
Start: 2023-02-06 | End: 2023-02-06

## 2023-02-06 RX ORDER — PHENYLEPHRINE HYDROCHLORIDE 10 MG/ML
200 INJECTION INTRAVENOUS ONCE
Status: DISCONTINUED | OUTPATIENT
Start: 2023-02-06 | End: 2023-02-06

## 2023-02-06 RX ADMIN — Medication 200 MCG: at 22:50

## 2023-02-07 NOTE — ED TRIAGE NOTES
Patient states he has had an erection for a day; pt states he has tried other things to resolve the issue, but nothing has helped; pt states he thinks he injected too much medication; pt states he was prescribed medication to help give him an erection, and patient states he injects it in his penis

## 2023-02-07 NOTE — ED PROVIDER NOTES
[facility] EMERGENCY DEPT  EMERGENCY DEPARTMENT HISTORY AND PHYSICAL EXAM      Date: 2/6/2023  Patient Name: Osvaldo White  MRN: [id number]  YOB: 1965  Date of evaluation: 2/6/2023  Provider: Angie Davis NP   Note Started: 9:50 PM 2/6/23    HISTORY OF PRESENT ILLNESS     Chief Complaint   Patient presents with    Penis Pain       History Provided By: Patient    HPI: Osvaldo White, 59 y.o. male past medical history significant for diabetes, CVA, erectile dysfunction other history reviewed as listed below presents with approximately 2 days of sustained erection after using his injectable medication Timix prescribed by his urologist for his erectile dysfunction. Patient denies pain in the area, discoloration, pain or swelling in scrotum, inability to urinate or change in urinary amounts. Denies any other systemic symptoms. States he has attempted to resolve the erection with hot water baths and hot compresses because he researched it on the Internet. He has tried no other alleviating measures. He denies any history of priapism. States he may have injected slightly more medication than he is supposed to due to difficulty seeing the amount.     PAST MEDICAL HISTORY   Past Medical History:  Past Medical History:   Diagnosis Date    Arteriosclerotic gangrene ([address] 07/15/2020    Atherosclerosis of arteries of extremities ([address] 07/15/2020    Cataract 07/15/2020    Coronary arteriosclerosis in native artery 07/15/2020    Diabetes mellitus type 2, controlled ([address] 07/15/2020    Diabetic mononeuropathy associated with type 2 diabetes mellitus ([address] 07/15/2020    Diabetic peripheral neuropathy ([address] 07/15/2020    EKG, abnormal 07/15/2020    Elevated liver enzymes 07/15/2020    Erectile dysfunction 07/15/2020    Essential hypertension 07/15/2020    Hepatitis     High cholesterol     Hyperlipidemia 07/15/2020    Hypertension     Pain in both lower legs 07/15/2020    Peripheral arterial occlusive disease ([address] 07/15/2020    Peripheral circulatory disorder associated with type 2 diabetes mellitus ([facility] 07/15/2020    Spasm of back muscles 07/15/2020    Stroke ([facility]     Ulcer of foot ([facility] 07/15/2020       Past Surgical History:  Past Surgical History:   Procedure Laterality Date    HX CATARACT REMOVAL Bilateral     HX ORTHOPAEDIC Right     stents placed    HX ORTHOPAEDIC Left     HX WISDOM TEETH EXTRACTION      IR STENT PLACEMENT      Right Leg       Family History:  Family History   Problem Relation Age of Onset    Diabetes Mother     Heart Disease Mother     Diabetes Father     Diabetes Sister     Diabetes Brother        Social History:  Social History     Tobacco Use    Smoking status: Never    Smokeless tobacco: Never   Vaping Use    Vaping Use: Never used   Substance Use Topics    Alcohol use: Not Currently    Drug use: Never       Allergies:  No Known Allergies    PCP: Jemal, MD Sam    Current Meds:   Previous Medications    ALCOHOL SWABS (ALCOHOL PADS) PADM    Use to check glucose 3 times a day before meals    ASPIRIN 81 MG CHEWABLE TABLET    Take 1 Tablet by mouth daily. ATORVASTATIN (LIPITOR) 40 MG TABLET    Take 1 Tablet by mouth daily. CLOPIDOGREL (PLAVIX) 75 MG TAB    Take 1 Tablet by mouth daily. ESCITALOPRAM OXALATE (LEXAPRO) 10 MG TABLET    Take 1 Tablet by mouth daily. FEROSUL 325 MG (65 MG IRON) TABLET    Take 325 mg by mouth daily. GLUCOSE 4 GRAM CHEWABLE TABLET    Take 4 Tablets by mouth as needed for PRN Reason (Other) (hypoglycemia.). GLUCOSE BLOOD VI TEST STRIPS (PRODIGY NO CODING) STRIP    TEST  GLUCOSE 4 times DAILY    HUMULIN N NPH U-100 INSULIN 100 UNIT/ML INJECTION    INJECT EIGHT UNITS SUBCUTANEOUSLY ONCE DAILY AND INJECT sliding scale THREE TIMES DAILY BEFORE MEALS (max 12 UNITS PER DOSE AND 36 UNITS DAILY)    HYDROCHLOROTHIAZIDE (HYDRODIURIL) 25 MG TABLET    Take 1 Tablet by mouth daily.     INSULIN GLARGINE (LANTUS) 100 UNIT/ML INJECTION    60 units at bed time    INSULIN SYRINGE-NEEDLE U-100 0.3 ML 30 GAUGE X 5/16\" SYRG    Use to inject novolin in sliding scale as indicated 3 times per day before meals    LABETALOL (NORMODYNE) 100 MG TABLET    TAKE ONE TABLET BY MOUTH TWICE DAILY FOR 90 DAYS    LANCETS (PRODIGY TWIST TOP LANCET) 28 GAUGE MISC    by Does Not Apply route Before breakfast, lunch, dinner and at bedtime. LIDOCAINE (LIDODERM) 5 %    APPLY 1 PATCH TO THE AFFECTED AREA FOR 12 HOURS (ON FOR 12 HOURS THEN OFF FOR 12 HOURS)    LIRAGLUTIDE (VICTOZA 3-KEKE) 0.6 MG/0.1 ML (18 MG/3 ML) PNIJ    1.8 mg by SubCUTAneous route daily for 270 days. LOSARTAN (COZAAR) 100 MG TABLET    Take 1 Tablet by mouth daily. Indications: high blood pressure    NALOXONE (NARCAN) 4 MG/ACTUATION NASAL SPRAY    Administer 1 spray into one nostril as needed for opioid reversal or respiratory depression. Call 911. If patient does not respond, or responds and then relapses, give additional doses every 2 to 3 minutes, alternating nostrils, until medical help arrives. Use as directed. NOVOLOG FLEXPEN U-100 INSULIN 100 UNIT/ML (3 ML) INPN    INJECT 2 UNITS PER 50u above 150mg/dl 3 times daily before meals as needed according to sliding scale. max of 36 units daily  Indications: type 2 diabetes mellitus    OMEPRAZOLE (PRILOSEC) 20 MG CAPSULE        SILDENAFIL CITRATE (VIAGRA) 100 MG TABLET    Take 100 mg by mouth. TAMSULOSIN (FLOMAX) 0.4 MG CAPSULE    Take 1 Capsule by mouth daily. TOPIRAMATE (TOPAMAX) 25 MG TABLET    Take 2 tablets by mouth 2 times daily. TRAZODONE (DESYREL) 50 MG TABLET    Take 1 Tablet by mouth nightly as needed for Sleep. Indications: major depressive disorder    [employer] PEN NEEDLE 28 GAUGE X 5/32\" NDLE    USE TO GIVE INSULIN INJECTIONS FOUR TIMES DAILY       REVIEW OF SYSTEMS   Review of Systems   Constitutional: Negative. HENT: Negative. Eyes: Negative. Respiratory: Negative. Cardiovascular: Negative. Gastrointestinal: Negative.     Genitourinary: Sustained erection   Musculoskeletal: Negative. Skin: Negative. Neurological: Negative. Hematological: Negative. Psychiatric/Behavioral: Negative. All other systems reviewed and are negative. Positives and Pertinent negatives as per HPI. PHYSICAL EXAM     ED Triage Vitals   ED Encounter Vitals Group      BP 02/06/23 1950 135/88      Pulse (Heart Rate) 02/06/23 1950 99      Resp Rate 02/06/23 1950 20      Temp 02/06/23 1950 97.5 °F (36.4 °C)      Temp src --       O2 Sat (%) 02/06/23 1950 96 %      Weight 02/06/23 1950 240 lb      Height 02/06/23 1950 5' 9\"      Physical Exam  Vitals and nursing note reviewed. Constitutional:       General: He is not in acute distress. Appearance: Normal appearance. He is normal weight. He is not ill-appearing, toxic-appearing or diaphoretic. HENT:      Head: Normocephalic. Mouth/Throat:      Mouth: Mucous membranes are moist.   Eyes:      Conjunctiva/sclera: Conjunctivae normal.   Cardiovascular:      Rate and Rhythm: Normal rate. Pulmonary:      Effort: Pulmonary effort is normal. No respiratory distress. Abdominal:      General: Bowel sounds are normal.      Palpations: Abdomen is soft. Tenderness: There is no abdominal tenderness. There is no guarding. Musculoskeletal:         General: Normal range of motion. Cervical back: Neck supple. Skin:     General: Skin is warm and dry. Capillary Refill: Capillary refill takes less than 2 seconds. Findings: No bruising or erythema. Neurological:      General: No focal deficit present. Mental Status: He is alert and oriented to person, place, and time. Psychiatric:         Behavior: Behavior normal.       SCREENINGS               No data recorded        LAB, EKG AND DIAGNOSTIC RESULTS   Labs:  No results found for this or any previous visit (from the past 12 hour(s)).     EKG: No indication for EKG    Radiologic Studies:  Non-plain film images such as CT, Ultrasound and MRI are read by the radiologist. Plain radiographic images are visualized and preliminarily interpreted by the ED Provider with the below findings:        Interpretation per the Radiologist below, if available at the time of this note:  No results found. PROCEDURES   Unless otherwise noted below, none. Performed by: Azucena Thurston NP   Other Procedure    Date/Time: 2/6/2023 10:52 PM  Performed by: Bella Chisholm MD  Authorized by: Bella Chisholm MD     Consent:     Consent obtained:  Verbal    Consent given by:  Patient    Risks, benefits, and alternatives were discussed: yes      Risks discussed:  Bleeding, infection and pain    Alternatives discussed:  No treatment, delayed treatment, alternative treatment, observation and referral  Universal protocol:     Procedure explained and questions answered to patient or proxy's satisfaction: yes      Patient identity confirmed:  Verbally with patient and arm band  Indications:     Indications:  Priapism  Pre-procedure details:     Skin preparation:  Chlorhexidine with alcohol  Sedation:     Sedation type:  None  Anesthesia:     Anesthesia method:  None  Procedure specific details:      Injection of phenylephrine into penis for priapism  Post-procedure details:     Procedure completion:  Tolerated well, no immediate complications      CRITICAL CARE TIME   No critical care time or criteria met.     ED COURSE and DIFFERENTIAL DIAGNOSIS/MDM   Vitals:    Vitals:    02/06/23 1950 02/06/23 1950   BP:  135/88   Pulse:  99   Resp:  20   Temp:  97.5 °F (36.4 °C)   SpO2:  96%   Weight: 108.9 kg (240 lb)    Height: 5' 9\" (1.753 m)         Patient was given the following medications:  Medications   PHENYLephrine (KASEY-SYNEPHRINE) 100 mcg/mL in NS syringe 200 mcg (200 mcg IntraCAVernosal Given 2/6/23 2250)       CONSULTS: (Who and What was discussed)  None     Chronic Conditions: Multiple comorbidities as listed above  Social Determinants affecting Dx or Tx: None Records Reviewed (source and summary of external notes): Prior medical records, Previous Radiology studies, Previous Laboratory studies, and Nursing notes    CC/HPI Summary, DDx, ED Course, and Reassessment: Presents with sustained erection. No acutely abnormal vital signs. Physical exam reveals no evidence of necrosis or discoloration to skin. No other physical exam abnormalities. Denies any pain and is otherwise benign and reassuring physical exam.  Differentials include priapism, vascular emergency. Discussed with ED attending and given the fact that patient has attempted vasodilating measures to resolve the priapism likely contributing to the sustained nature, will attempt ice therapy and cooling measures while planning for intervention with aspiration versus medication administration and patient is agreeable. ED Course as of 02/06/23 2340   Mon Feb 06, 2023   2254 0.25 mL injected into the corpus. We will reevaluate patient tolerated without complication [CS]   1682 Patient injected with phenylephrine by Dr. Vy Wilson for priapism. Will reassess for effectiveness [KR]   2325 Erection resolving. [KR]      ED Course User Index  [CS] Beata Rao MD  [KR] Afshin Meyer NP       Disposition Considerations (Tests not done, Shared Decision Making, Pt Expectation of Test or Treatment.):  Patient's priapism resolved. No evidence of ischemia and capillary refill is appropriate patient denies pain. Discussed discharge planning with patient and he will be following with his urologist who plans to call in the morning. Discussed his medications and he will be discussing that with his urologist as well and knows to carefully draw up the dosage but he will not be using the medication till he has seen his urologist as follow-up from this ER visit. Strict return precautions given and patient and wife are comfortable and agreeable with discharge and follow-up plan as made.     FINAL IMPRESSION     1. Nonischemic priapism          DISPOSITION/PLAN   Discharged    Discharge Note: The patient is stable for discharge home. The signs, symptoms, diagnosis, and discharge instructions have been discussed, understanding conveyed, and agreed upon. The patient is to follow up as recommended or return to ER should their symptoms worsen. PATIENT REFERRED TO:  Follow-up Information       Follow up With Specialties Details Why Contact Info    Bandar Mack NP Nurse Practitioner Schedule an appointment as soon as possible for a visit  Follow up with your urologist [address] [place] [address]  [phone]      Follow up with primary care, urgent care, or this Emergency department   As needed, If symptoms worsen               DISCHARGE MEDICATIONS:  Current Discharge Medication List            DISCONTINUED MEDICATIONS:  Current Discharge Medication List          I am the Primary Clinician of Record: Lreoy Martines NP (electronically signed)    (Please note that parts of this dictation were completed with voice recognition software. Quite often unanticipated grammatical, syntax, homophones, and other interpretive errors are inadvertently transcribed by the computer software. Please disregards these errors.  Please excuse any errors that have escaped final proofreading.)

## 2023-03-29 ENCOUNTER — TELEPHONE (OUTPATIENT)
Dept: UROLOGY | Age: 58
End: 2023-03-29

## 2023-03-29 NOTE — TELEPHONE ENCOUNTER
Pt needs a refill on his testosterone injection medication and sent to [facility] in [place]

## 2023-03-30 NOTE — TELEPHONE ENCOUNTER
The patient has not been evaluated for ED and the need for testosterone injection by DR. Wendy Berry.  His testosterone was checked  by his PCP 2 years ago.  He can schedule an appointment with Dr. Wendy Berry  to discuss ED treatment and need for injection

## 2023-03-31 NOTE — TELEPHONE ENCOUNTER
Spoke with pt and advised him of information below.  He states he has an appt on Next Friday with [facility]

## 2023-05-17 NOTE — PROGRESS NOTES
HISTORY OF PRESENT ILLNESS    Alexandria Juares is a 59 y.o. male is here for follow up on his ED, He has  Erectile dysfunction, gradual deterioration since 2014 at which time patient had a CVA. He has been using trimix with good results. His last testosterone level was checked in 2021. Labs:  3/2021 -serum T was 206,   4/6/21 serum T was 323, FSH and LH normal.    Patient was using Trimix but doubled up his dosing 1 day and up with priapism. We takes a normal dosing for him which is 5030 and 1 he has no problems with priapism he wants to go back and same dosing no he still could get priapism with that dose. Trying over 100 mg of Viagra without success recently. He is unable to have any sort of erection he is diabetic as well. I talked to the compounding pharmacy myself and in October the last prescription he received it worked for him without problems with his normal injection amount           Past Medical History:  PMHx (including negatives):  has a past medical history of Arteriosclerotic gangrene ([address] Atherosclerosis of arteries of extremities ([address] Cataract, Coronary arteriosclerosis in native artery, Diabetes mellitus type 2, controlled ([address] Diabetic mononeuropathy associated with type 2 diabetes mellitus ([address] Diabetic peripheral neuropathy ([address] EKG, abnormal, Elevated liver enzymes, Erectile dysfunction, Essential hypertension, Hepatitis, High cholesterol, Hyperlipidemia, Hypertension, Pain in both lower legs, Peripheral arterial occlusive disease ([address] Peripheral circulatory disorder associated with type 2 diabetes mellitus ([address] Spasm of back muscles, Stroke ([address] and Ulcer of foot ([address] PSurgHx:  has a past surgical history that includes [place] tooth extraction; Cataract removal (Bilateral); orthopedic surgery (Left); orthopedic surgery (Right); IR TRANSCATH PLACE INTRAVASC STENT OPEN/PERC W OR WO ANGIO INIT VEIN; and Ilio-femoral Bypass Graft (11/11/2022).   PSocHx:  reports that he has

## 2023-05-18 ENCOUNTER — OFFICE VISIT (OUTPATIENT)
Age: 58
End: 2023-05-18

## 2023-05-18 VITALS
RESPIRATION RATE: 16 BRPM | TEMPERATURE: 95 F | HEART RATE: 68 BPM | DIASTOLIC BLOOD PRESSURE: 100 MMHG | WEIGHT: 240 LBS | BODY MASS INDEX: 35.55 KG/M2 | SYSTOLIC BLOOD PRESSURE: 146 MMHG | HEIGHT: 69 IN | OXYGEN SATURATION: 97 %

## 2023-05-18 DIAGNOSIS — Z12.5 PROSTATE CANCER SCREENING: ICD-10-CM

## 2023-05-18 DIAGNOSIS — R79.89 LOW TESTOSTERONE: ICD-10-CM

## 2023-05-18 DIAGNOSIS — R31.0 GROSS HEMATURIA: ICD-10-CM

## 2023-05-18 DIAGNOSIS — R33.9 URINARY RETENTION: Primary | ICD-10-CM

## 2023-05-18 PROBLEM — M19.019 ACROMIOCLAVICULAR JOINT ARTHRITIS: Status: ACTIVE | Noted: 2019-06-21

## 2023-05-18 PROBLEM — I63.9 CEREBROVASCULAR ACCIDENT (CVA) (HCC): Status: ACTIVE | Noted: 2019-06-26

## 2023-05-18 PROBLEM — M77.9 CAPSULITIS: Status: ACTIVE | Noted: 2019-06-21

## 2023-05-18 PROBLEM — M79.643 HAND PAIN: Status: ACTIVE | Noted: 2019-06-21

## 2023-05-18 PROBLEM — M47.816 ARTHROPATHY OF LUMBAR FACET JOINT: Status: ACTIVE | Noted: 2019-06-21

## 2023-05-18 PROBLEM — L60.9 DISORDER OF NAIL: Status: ACTIVE | Noted: 2019-06-21

## 2023-05-18 PROBLEM — L85.1 ACQUIRED KERATODERMA: Status: ACTIVE | Noted: 2020-05-15

## 2023-05-18 PROBLEM — M79.10 MUSCLE TENSION PAIN: Status: ACTIVE | Noted: 2019-06-21

## 2023-05-18 PROBLEM — M54.50 LOW BACK PAIN: Status: ACTIVE | Noted: 2019-06-21

## 2023-05-18 PROBLEM — M24.549 CONTRACTURE OF JOINT OF HAND: Status: ACTIVE | Noted: 2019-06-21

## 2023-05-18 PROBLEM — L03.032 CELLULITIS OF TOE OF LEFT FOOT: Status: ACTIVE | Noted: 2020-04-27

## 2023-05-18 PROBLEM — I69.90 LATE EFFECTS OF CEREBROVASCULAR DISEASE: Status: ACTIVE | Noted: 2022-03-21

## 2023-05-18 PROBLEM — M75.42 IMPINGEMENT SYNDROME OF LEFT SHOULDER: Status: ACTIVE | Noted: 2019-06-21

## 2023-05-18 PROBLEM — M72.0 DUPUYTREN'S DISEASE OF PALM: Status: ACTIVE | Noted: 2019-06-21

## 2023-05-18 PROBLEM — M71.9: Status: ACTIVE | Noted: 2020-12-07

## 2023-05-18 LAB
BILIRUBIN, URINE, POC: NEGATIVE
BLOOD URINE, POC: NEGATIVE
GLUCOSE URINE, POC: NEGATIVE
KETONES, URINE, POC: NEGATIVE
LEUKOCYTE ESTERASE, URINE, POC: NEGATIVE
NITRITE, URINE, POC: NEGATIVE
PH, URINE, POC: 7.5 (ref 4.6–8)
PROTEIN,URINE, POC: NEGATIVE
SPECIFIC GRAVITY, URINE, POC: 1.02 (ref 1–1.03)
URINALYSIS CLARITY, POC: CLEAR
URINALYSIS COLOR, POC: YELLOW
UROBILINOGEN, POC: NORMAL

## 2023-05-18 RX ORDER — INSULIN GLARGINE 100 [IU]/ML
INJECTION, SOLUTION SUBCUTANEOUS
COMMUNITY
Start: 2023-05-09

## 2023-05-18 RX ORDER — LIRAGLUTIDE 6 MG/ML
INJECTION SUBCUTANEOUS
COMMUNITY
Start: 2023-05-09

## 2023-05-18 RX ORDER — LOSARTAN POTASSIUM 100 MG/1
TABLET ORAL
COMMUNITY
Start: 2023-03-24

## 2023-05-18 RX ORDER — INSULIN ASPART 100 [IU]/ML
INJECTION, SOLUTION INTRAVENOUS; SUBCUTANEOUS
COMMUNITY
Start: 2023-05-10

## 2023-05-18 RX ORDER — SILDENAFIL 100 MG/1
TABLET, FILM COATED ORAL
COMMUNITY
Start: 2023-04-15

## 2023-05-18 RX ORDER — INSULIN ASPART 100 [IU]/ML
INJECTION, SOLUTION INTRAVENOUS; SUBCUTANEOUS
COMMUNITY
Start: 2023-02-28

## 2023-05-18 RX ORDER — ATORVASTATIN CALCIUM 40 MG/1
TABLET, FILM COATED ORAL
COMMUNITY
Start: 2023-03-24

## 2023-05-18 RX ORDER — BLOOD SUGAR DIAGNOSTIC
STRIP MISCELLANEOUS
COMMUNITY
Start: 2023-05-09

## 2023-05-18 RX ORDER — HYDROCHLOROTHIAZIDE 25 MG/1
TABLET ORAL
COMMUNITY
Start: 2023-03-30

## 2023-05-18 RX ORDER — CLOPIDOGREL BISULFATE 75 MG/1
TABLET ORAL
COMMUNITY
Start: 2023-03-24

## 2023-05-18 RX ORDER — LABETALOL 100 MG/1
TABLET, FILM COATED ORAL
COMMUNITY
Start: 2023-03-29

## 2023-05-18 NOTE — PROGRESS NOTES
Chief Complaint   Patient presents with    Follow-up    Erectile Dysfunction        BP (!) 146/100   Pulse 68   Temp (!) 95 °F (35 °C)   Resp 16   Ht 5' 9\" (1.753 m)   Wt 240 lb (108.9 kg)   SpO2 97%   BMI 35.44 kg/m²      PHQ-9 score is    Negative      1. \"Have you been to the ER, urgent care clinic since your last visit? Hospitalized since your last visit? \" No    2. \"Have you seen or consulted any other health care providers outside of the [address] [place] [address] since your last visit? \" No     3. For patients aged 39-70: Has the patient had a colonoscopy / FIT/ Cologuard? Yes - no Care Gap present      If the patient is female:    4. For patients aged 41-77: Has the patient had a mammogram within the past 2 years? NA - based on age or sex      11. For patients aged 21-65: Has the patient had a pap smear?  NA - based on age or sex

## 2023-06-01 ENCOUNTER — OFFICE VISIT (OUTPATIENT)
Age: 58
End: 2023-06-01
Payer: MEDICARE

## 2023-06-01 DIAGNOSIS — L60.2 HYPERTROPHY OF NAIL: Primary | ICD-10-CM

## 2023-06-01 DIAGNOSIS — E11.42 DIABETIC SENSORIMOTOR NEUROPATHY (HCC): ICD-10-CM

## 2023-06-01 PROCEDURE — 99213 OFFICE O/P EST LOW 20 MIN: CPT | Performed by: PODIATRIST

## 2023-06-01 PROCEDURE — 11721 DEBRIDE NAIL 6 OR MORE: CPT | Performed by: PODIATRIST

## 2023-06-08 RX ORDER — LANCETS 33 GAUGE
EACH MISCELLANEOUS
Qty: 200 EACH | Refills: 5 | Status: SHIPPED | OUTPATIENT
Start: 2023-06-08

## 2023-07-06 ENCOUNTER — OFFICE VISIT (OUTPATIENT)
Age: 58
End: 2023-07-06
Payer: MEDICARE

## 2023-07-06 VITALS
RESPIRATION RATE: 16 BRPM | BODY MASS INDEX: 35.25 KG/M2 | HEART RATE: 75 BPM | WEIGHT: 238 LBS | OXYGEN SATURATION: 97 % | DIASTOLIC BLOOD PRESSURE: 79 MMHG | TEMPERATURE: 98.3 F | SYSTOLIC BLOOD PRESSURE: 122 MMHG | HEIGHT: 69 IN

## 2023-07-06 DIAGNOSIS — E11.42 TYPE 2 DIABETES MELLITUS WITH DIABETIC POLYNEUROPATHY, UNSPECIFIED WHETHER LONG TERM INSULIN USE (HCC): ICD-10-CM

## 2023-07-06 DIAGNOSIS — F14.10 COCAINE ABUSE, UNCOMPLICATED (HCC): ICD-10-CM

## 2023-07-06 DIAGNOSIS — R79.89 LOW TESTOSTERONE: ICD-10-CM

## 2023-07-06 DIAGNOSIS — E29.1 HYPOGONADISM MALE: Primary | ICD-10-CM

## 2023-07-06 DIAGNOSIS — N52.9 ERECTILE DYSFUNCTION, UNSPECIFIED ERECTILE DYSFUNCTION TYPE: ICD-10-CM

## 2023-07-06 LAB
BILIRUBIN, URINE, POC: NEGATIVE
BLOOD URINE, POC: NORMAL
GLUCOSE URINE, POC: NEGATIVE
KETONES, URINE, POC: NEGATIVE
LEUKOCYTE ESTERASE, URINE, POC: NEGATIVE
NITRITE, URINE, POC: NEGATIVE
PH, URINE, POC: 7 (ref 4.6–8)
PROTEIN,URINE, POC: NEGATIVE
SPECIFIC GRAVITY, URINE, POC: 1.01 (ref 1–1.03)
URINALYSIS CLARITY, POC: CLEAR
URINALYSIS COLOR, POC: YELLOW
UROBILINOGEN, POC: NORMAL

## 2023-07-06 PROCEDURE — 3074F SYST BP LT 130 MM HG: CPT | Performed by: UROLOGY

## 2023-07-06 PROCEDURE — 3078F DIAST BP <80 MM HG: CPT | Performed by: UROLOGY

## 2023-07-06 PROCEDURE — 99214 OFFICE O/P EST MOD 30 MIN: CPT | Performed by: UROLOGY

## 2023-07-06 PROCEDURE — 81003 URINALYSIS AUTO W/O SCOPE: CPT | Performed by: UROLOGY

## 2023-07-06 RX ORDER — INSULIN HUMAN 100 [IU]/ML
INJECTION, SUSPENSION SUBCUTANEOUS
COMMUNITY
Start: 2023-06-08

## 2023-07-06 RX ORDER — PEN NEEDLE, DIABETIC 32GX 5/32"
NEEDLE, DISPOSABLE MISCELLANEOUS
COMMUNITY
Start: 2023-06-23

## 2023-08-08 DIAGNOSIS — E11.42 TYPE 2 DIABETES MELLITUS WITH DIABETIC POLYNEUROPATHY (HCC): ICD-10-CM

## 2023-08-11 ENCOUNTER — TELEPHONE (OUTPATIENT)
Age: 58
End: 2023-08-11

## 2023-08-11 NOTE — TELEPHONE ENCOUNTER
Pt would like to schedule a surgery for what him and  discussed during the last visit.   If there are any questions pt said to please call  [phone]

## 2023-08-16 NOTE — BH NOTES
Pt. Encouraged to attend group but did not attend.   Psycho-ED/Diaphragmatic breathing Subjective:       Patient ID: Shirley Thomas is a 39 y.o. female.    Chief Complaint: No chief complaint on file.      HPI:  Patient is a 39-year-old female presenting today following up on her attention deficit disorder.  She indicates she has been doing all right.  She is taking her medication as prescribed.  Life and work is very busy at this time but she does not feel like the meds need to be adjusted.  There is maybe little bit room for improvement but not enough to warrant taking more medicine at this point she feels.      She has a slightly tender swollen area behind her left ear that began a day or so ago.  She said was seen like was getting worse through the day yesterday but it is better today.  She is sent a picture and through my [facility] and it does show what looks like a little inflamed hair follicle.    The patient location is: [place]  The chief complaint leading to consultation is: add  Total time spent with patient: 10 min    Visit type: Audiovisual  Each patient to whom he or she provides medical services by telemedicine is:  (1) informed of the relationship between the physician and patient and the respective role of any other health care provider with respect to management of the patient; and (2) notified that he or she may decline to receive medical services by telemedicine and may withdraw from such care at any time.        Review of Systems   Constitutional:  Negative for activity change and unexpected weight change.   HENT:  Negative for hearing loss, rhinorrhea and trouble swallowing.    Eyes:  Negative for discharge and visual disturbance.   Respiratory:  Negative for chest tightness and wheezing.    Cardiovascular:  Negative for chest pain and palpitations.   Gastrointestinal:  Negative for blood in stool, constipation, diarrhea and vomiting.   Endocrine: Negative for polydipsia and polyuria.   Genitourinary:  Negative for difficulty urinating, dysuria, hematuria and menstrual  problem.   Musculoskeletal:  Negative for arthralgias, joint swelling and neck pain.   Neurological:  Negative for weakness and headaches.   Psychiatric/Behavioral:  Negative for confusion and dysphoric mood.        Objective:   There were no vitals taken for this visit.     Physical Exam  Constitutional:       General: She is not in acute distress.     Appearance: Normal appearance. She is well-developed. She is not ill-appearing.   HENT:      Head: Normocephalic and atraumatic.   Eyes:      Extraocular Movements: Extraocular movements intact.   Pulmonary:      Effort: Pulmonary effort is normal. No respiratory distress.   Musculoskeletal:      Cervical back: Normal range of motion.   Skin:     Coloration: Skin is not jaundiced or pale.      Findings: No rash.   Neurological:      General: No focal deficit present.      Mental Status: She is alert and oriented to person, place, and time.      Cranial Nerves: No cranial nerve deficit.   Psychiatric:         Behavior: Behavior normal.         Thought Content: Thought content normal.         No visits with results within 2 Week(s) from this visit.   Latest known visit with results is:   Lab Visit on 03/01/2023   Component Date Value    WBC 03/01/2023 6.79     RBC 03/01/2023 4.30     Hemoglobin 03/01/2023 11.9 (L)     Hematocrit 03/01/2023 39.5     MCV 03/01/2023 92     MCH 03/01/2023 27.7     MCHC 03/01/2023 30.1 (L)     RDW 03/01/2023 14.6 (H)     Platelets 03/01/2023 419     MPV 03/01/2023 9.8     Immature Granulocytes 03/01/2023 0.4     Gran # (ANC) 03/01/2023 4.4     Immature Grans (Abs) 03/01/2023 0.03     Lymph # 03/01/2023 1.8     Mono # 03/01/2023 0.5     Eos # 03/01/2023 0.1     Baso # 03/01/2023 0.05     nRBC 03/01/2023 0     Gran % 03/01/2023 64.7     Lymph % 03/01/2023 25.9     Mono % 03/01/2023 7.1     Eosinophil % 03/01/2023 1.2     Basophil % 03/01/2023 0.7     Differential Method 03/01/2023 Automated        Assessment:       1. Attention deficit  hyperactivity disorder (ADHD), predominantly inattentive type    2. Inflamed hair follicle        Plan:   No problem-specific Assessment & Plan notes found for this encounter.    Attention deficit hyperactivity disorder (ADHD), predominantly inattentive type  Comments:  Continue meds, stable    Inflamed hair follicle  Comments:  Monitor          Follow up in about 3 months (around 11/16/2023).

## 2023-08-25 RX ORDER — LIDOCAINE 50 MG/G
PATCH TOPICAL
Qty: 30 PATCH | Refills: 4 | Status: SHIPPED | OUTPATIENT
Start: 2023-08-25

## 2023-08-30 NOTE — PROGRESS NOTES
HISTORY OF PRESENT ILLNESS    Lu Bolanos is a 59 y.o. male. .is  for follow up on his ED,   He has a history of   Erectile dysfunction and had gradual deterioration since 2014 at which time patient had a CVA. His last testosterone level was checked in 2021. Labs:  3/2021 -serum T was 206,   4/6/21 serum T was 323, FSH and LH normal     He had been using trimix with good results. Last appointment his trimix was increased to quad mix,  He does have problems with the vascular system and is on Plavix as well. Testasterone levels are not done  As a penile prosthesis I am going to refer him to Dr. Marva Stone in [place] he does an excellent job       Past Medical History:  PMHx (including negatives):  has a past medical history of Arteriosclerotic gangrene ([address]), Atherosclerosis of arteries of extremities ([address]), Cataract, Coronary arteriosclerosis in native artery, Diabetes mellitus type 2, controlled ([address]), Diabetic mononeuropathy associated with type 2 diabetes mellitus ([address]), Diabetic peripheral neuropathy ([address]), EKG, abnormal, Elevated liver enzymes, Erectile dysfunction, Essential hypertension, Hepatitis, High cholesterol, Hyperlipidemia, Hypertension, Pain in both lower legs, Peripheral arterial occlusive disease ([address]), Peripheral circulatory disorder associated with type 2 diabetes mellitus ([address]), Spasm of back muscles, Stroke ([address]), and Ulcer of foot ([address]). PSurgHx:  has a past surgical history that includes [place] tooth extraction; Cataract removal (Bilateral); orthopedic surgery (Left); orthopedic surgery (Right); IR TRANSCATH PLACE INTRAVASC STENT OPEN/PERC W OR WO ANGIO INIT VEIN; and Ilio-femoral Bypass Graft (11/11/2022). PSocHx:  reports that he has never smoked. He has never used smokeless tobacco. He reports that he does not currently use alcohol. He reports that he does not use drugs. [unfilled]     1.  Low testosterone  Overview:  3/22/21 serum T was 206  4/6/21 serum T was

## 2023-08-31 ENCOUNTER — OFFICE VISIT (OUTPATIENT)
Age: 58
End: 2023-08-31
Payer: MEDICAID

## 2023-08-31 VITALS
HEART RATE: 75 BPM | DIASTOLIC BLOOD PRESSURE: 101 MMHG | SYSTOLIC BLOOD PRESSURE: 158 MMHG | RESPIRATION RATE: 16 BRPM | OXYGEN SATURATION: 96 % | TEMPERATURE: 97.7 F

## 2023-08-31 DIAGNOSIS — E29.1 HYPOGONADISM MALE: ICD-10-CM

## 2023-08-31 DIAGNOSIS — R79.89 LOW TESTOSTERONE: Primary | ICD-10-CM

## 2023-08-31 PROCEDURE — 3077F SYST BP >= 140 MM HG: CPT | Performed by: UROLOGY

## 2023-08-31 PROCEDURE — 99214 OFFICE O/P EST MOD 30 MIN: CPT | Performed by: UROLOGY

## 2023-08-31 PROCEDURE — 3080F DIAST BP >= 90 MM HG: CPT | Performed by: UROLOGY

## 2023-08-31 RX ORDER — ASPIRIN 81 MG/1
81 TABLET ORAL DAILY
COMMUNITY

## 2023-08-31 RX ORDER — MULTIVIT-MIN/IRON/FOLIC ACID/K 18-600-40
CAPSULE ORAL
COMMUNITY

## 2023-09-01 LAB
BILIRUBIN, URINE, POC: NEGATIVE
BLOOD URINE, POC: NEGATIVE
GLUCOSE URINE, POC: NEGATIVE
KETONES, URINE, POC: NEGATIVE
LEUKOCYTE ESTERASE, URINE, POC: NEGATIVE
NITRITE, URINE, POC: NEGATIVE
PH, URINE, POC: 5.5 (ref 4.6–8)
PROTEIN,URINE, POC: NEGATIVE
SPECIFIC GRAVITY, URINE, POC: 1.02 (ref 1–1.03)
URINALYSIS CLARITY, POC: CLEAR
URINALYSIS COLOR, POC: YELLOW
UROBILINOGEN, POC: NORMAL

## 2023-09-05 ENCOUNTER — OFFICE VISIT (OUTPATIENT)
Age: 58
End: 2023-09-05
Payer: MEDICAID

## 2023-09-05 VITALS
DIASTOLIC BLOOD PRESSURE: 89 MMHG | SYSTOLIC BLOOD PRESSURE: 139 MMHG | BODY MASS INDEX: 37.83 KG/M2 | RESPIRATION RATE: 16 BRPM | OXYGEN SATURATION: 97 % | HEIGHT: 67 IN | TEMPERATURE: 98.6 F | WEIGHT: 241 LBS | HEART RATE: 81 BPM

## 2023-09-05 DIAGNOSIS — B35.1 ONYCHOMYCOSIS: Primary | ICD-10-CM

## 2023-09-05 DIAGNOSIS — E11.42 DIABETIC SENSORIMOTOR NEUROPATHY (HCC): ICD-10-CM

## 2023-09-05 PROCEDURE — 3075F SYST BP GE 130 - 139MM HG: CPT | Performed by: PODIATRIST

## 2023-09-05 PROCEDURE — 11721 DEBRIDE NAIL 6 OR MORE: CPT | Performed by: PODIATRIST

## 2023-09-05 PROCEDURE — 99213 OFFICE O/P EST LOW 20 MIN: CPT | Performed by: PODIATRIST

## 2023-09-05 PROCEDURE — 3079F DIAST BP 80-89 MM HG: CPT | Performed by: PODIATRIST

## 2023-09-05 ASSESSMENT — ENCOUNTER SYMPTOMS
VOMITING: 0
SHORTNESS OF BREATH: 0
DIARRHEA: 0
ABDOMINAL PAIN: 0

## 2023-09-05 NOTE — PROGRESS NOTES
[address] [facility] FOOT SURGERY       Patient Name: Skip Prater    : 1965    Visit Date: 2023    Office Visit Note    Subjective:         Patient is a 59 y.o. male who is being seen as a follow up with a history of diabetes with a chief complain of painful elongated toenails. Patient primary care physician is Nancy Johnson MD. Patient states the last office visit with PCP was 22. Patient describes diabetes as being under control. Patient's last hemoglobin A1c was unknown. Patient denies any overt pedal pain. Patient denies any recent pedal trauma. Patient denies any systemic signs of infection but does state nails are thick and discolored. No other lower extremity complaints at this time.     Past Medical History:   Diagnosis Date    Arteriosclerotic gangrene ([address]) 07/15/2020    Atherosclerosis of arteries of extremities ([address]) 07/15/2020    Cataract 07/15/2020    Coronary arteriosclerosis in native artery 07/15/2020    Diabetes mellitus type 2, controlled ([address]) 07/15/2020    Diabetic mononeuropathy associated with type 2 diabetes mellitus ([address]) 07/15/2020    Diabetic peripheral neuropathy ([address]) 07/15/2020    EKG, abnormal 07/15/2020    Elevated liver enzymes 07/15/2020    Erectile dysfunction 07/15/2020    Essential hypertension 07/15/2020    Hepatitis     High cholesterol     Hyperlipidemia 07/15/2020    Hypertension     Pain in both lower legs 07/15/2020    Peripheral arterial occlusive disease ([address]) 07/15/2020    Peripheral circulatory disorder associated with type 2 diabetes mellitus ([address]) 07/15/2020    Spasm of back muscles 07/15/2020    Stroke ([address])     Ulcer of foot ([address]) 07/15/2020     Past Surgical History:   Procedure Laterality Date    CATARACT REMOVAL Bilateral     ILIO-FEMORAL BYPASS GRAFT  2022    IR STENT IMAG VASCULAR INITIAL VEIN      Right Leg    ORTHOPEDIC SURGERY Left     ORTHOPEDIC SURGERY Right     stents placed    BEN

## 2023-09-06 ENCOUNTER — TELEPHONE (OUTPATIENT)
Age: 58
End: 2023-09-06

## 2023-09-06 NOTE — TELEPHONE ENCOUNTER
Pt states that DR Sea Cheung OFFICE   Have not received a referral from us in regards to pt continue of care

## 2023-09-08 DIAGNOSIS — E11.42 TYPE 2 DIABETES MELLITUS WITH DIABETIC POLYNEUROPATHY (HCC): ICD-10-CM

## 2023-09-08 RX ORDER — BLOOD-GLUCOSE METER
EACH MISCELLANEOUS
Qty: 150 STRIP | Refills: 3 | OUTPATIENT
Start: 2023-09-08

## 2023-10-04 PROBLEM — R33.9 URINARY RETENTION: Status: ACTIVE | Noted: 2022-11-15

## 2023-10-04 PROBLEM — R31.0 GROSS HEMATURIA: Status: ACTIVE | Noted: 2022-11-27

## 2023-10-05 ENCOUNTER — OFFICE VISIT (OUTPATIENT)
Age: 58
End: 2023-10-05

## 2023-10-05 VITALS
OXYGEN SATURATION: 100 % | TEMPERATURE: 97.8 F | HEART RATE: 83 BPM | SYSTOLIC BLOOD PRESSURE: 148 MMHG | WEIGHT: 239 LBS | BODY MASS INDEX: 37.51 KG/M2 | DIASTOLIC BLOOD PRESSURE: 83 MMHG | HEIGHT: 67 IN

## 2023-10-05 DIAGNOSIS — E29.1 HYPOGONADISM MALE: ICD-10-CM

## 2023-10-05 DIAGNOSIS — I63.9 CEREBROVASCULAR ACCIDENT (CVA), UNSPECIFIED MECHANISM (HCC): Primary | ICD-10-CM

## 2023-10-05 DIAGNOSIS — R31.0 GROSS HEMATURIA: ICD-10-CM

## 2023-10-05 DIAGNOSIS — R33.9 URINARY RETENTION: ICD-10-CM

## 2023-10-05 DIAGNOSIS — I70.209 ATHEROSCLEROSIS OF ARTERIES OF EXTREMITIES (HCC): ICD-10-CM

## 2023-10-05 DIAGNOSIS — E11.42 DIABETIC PERIPHERAL NEUROPATHY (HCC): ICD-10-CM

## 2023-10-16 ASSESSMENT — ENCOUNTER SYMPTOMS
SHORTNESS OF BREATH: 0
DIARRHEA: 0
ABDOMINAL PAIN: 0
VOMITING: 0

## 2023-10-16 NOTE — PROGRESS NOTES
[address] [facility] FOOT SURGERY       Patient Name: Marques Lara    : 1965    Visit Date: 2023    Office Visit Note    Subjective:         Patient is a 59 y.o. male who is being seen as a follow up with a history of diabetes with a chief complain of painful elongated toenails. Patient primary care physician is Mitra Velez MD. Patient states the last office visit with PCP was 2022. Patient describes diabetes as being under control. Patient's last hemoglobin A1c was unknown. Patient denies any overt pedal pain. Patient denies any recent pedal trauma. Patient denies any systemic signs of infection but does state nails are thick and discolored. No other lower extremity complaints at this time.     Past Medical History:   Diagnosis Date    Arteriosclerotic gangrene ([address]) 07/15/2020    Atherosclerosis of arteries of extremities ([address]) 07/15/2020    Cataract 07/15/2020    Coronary arteriosclerosis in native artery 07/15/2020    Diabetes mellitus type 2, controlled ([address]) 07/15/2020    Diabetic mononeuropathy associated with type 2 diabetes mellitus ([address]) 07/15/2020    Diabetic peripheral neuropathy ([address]) 07/15/2020    EKG, abnormal 07/15/2020    Elevated liver enzymes 07/15/2020    Erectile dysfunction 07/15/2020    Essential hypertension 07/15/2020    Hepatitis     High cholesterol     Hyperlipidemia 07/15/2020    Hypertension     Pain in both lower legs 07/15/2020    Peripheral arterial occlusive disease ([address]) 07/15/2020    Peripheral circulatory disorder associated with type 2 diabetes mellitus ([address]) 07/15/2020    Spasm of back muscles 07/15/2020    Stroke ([address])     Ulcer of foot ([address]) 07/15/2020     Past Surgical History:   Procedure Laterality Date    CATARACT REMOVAL Bilateral     ILIO-FEMORAL BYPASS GRAFT  2022    IR STENT IMAG VASCULAR INITIAL VEIN      Right Leg    ORTHOPEDIC SURGERY Left     ORTHOPEDIC SURGERY Right     stents placed    BEN

## 2023-11-30 ENCOUNTER — PREP FOR PROCEDURE (OUTPATIENT)
Age: 58
End: 2023-11-30

## 2023-12-08 ENCOUNTER — HOSPITAL ENCOUNTER (OUTPATIENT)
Facility: HOSPITAL | Age: 58
End: 2023-12-08
Payer: MEDICARE

## 2023-12-08 VITALS
HEIGHT: 69 IN | RESPIRATION RATE: 16 BRPM | WEIGHT: 238.2 LBS | TEMPERATURE: 98.2 F | BODY MASS INDEX: 35.28 KG/M2 | HEART RATE: 75 BPM | SYSTOLIC BLOOD PRESSURE: 158 MMHG | OXYGEN SATURATION: 99 % | DIASTOLIC BLOOD PRESSURE: 97 MMHG

## 2023-12-08 LAB
ANION GAP SERPL CALC-SCNC: 7 MMOL/L (ref 5–15)
APPEARANCE UR: CLEAR
APTT PPP: 29.4 SEC (ref 21.2–34.1)
BACTERIA URNS QL MICRO: NEGATIVE /HPF
BILIRUB UR QL: NEGATIVE
BUN SERPL-MCNC: 12 MG/DL (ref 6–20)
BUN/CREAT SERPL: 12 (ref 12–20)
CA-I BLD-MCNC: 9.5 MG/DL (ref 8.5–10.1)
CHLORIDE SERPL-SCNC: 106 MMOL/L (ref 97–108)
CO2 SERPL-SCNC: 27 MMOL/L (ref 21–32)
COLOR UR: ABNORMAL
CREAT SERPL-MCNC: 1.03 MG/DL (ref 0.7–1.3)
EKG ATRIAL RATE: 67 BPM
EKG DIAGNOSIS: NORMAL
EKG P AXIS: 30 DEGREES
EKG P-R INTERVAL: 166 MS
EKG Q-T INTERVAL: 414 MS
EKG QRS DURATION: 94 MS
EKG QTC CALCULATION (BAZETT): 437 MS
EKG R AXIS: 56 DEGREES
EKG T AXIS: 132 DEGREES
EKG VENTRICULAR RATE: 67 BPM
EPITH CASTS URNS QL MICRO: ABNORMAL /LPF
ERYTHROCYTE [DISTWIDTH] IN BLOOD BY AUTOMATED COUNT: 11.7 % (ref 11.5–14.5)
EST. AVERAGE GLUCOSE BLD GHB EST-MCNC: 209 MG/DL
GLUCOSE SERPL-MCNC: 200 MG/DL (ref 65–100)
GLUCOSE UR STRIP.AUTO-MCNC: NEGATIVE MG/DL
HBA1C MFR BLD: 8.9 % (ref 4–5.6)
HCT VFR BLD AUTO: 38.9 % (ref 36.6–50.3)
HGB BLD-MCNC: 13 G/DL (ref 12.1–17)
HGB UR QL STRIP: NEGATIVE
INR PPP: 1 (ref 0.9–1.1)
KETONES UR QL STRIP.AUTO: NEGATIVE MG/DL
LEUKOCYTE ESTERASE UR QL STRIP.AUTO: NEGATIVE
MCH RBC QN AUTO: 28.1 PG (ref 26–34)
MCHC RBC AUTO-ENTMCNC: 33.4 G/DL (ref 30–36.5)
MCV RBC AUTO: 84 FL (ref 80–99)
MRSA DNA SPEC QL NAA+PROBE: NOT DETECTED
MUCOUS THREADS URNS QL MICRO: ABNORMAL /LPF
NITRITE UR QL STRIP.AUTO: NEGATIVE
NRBC # BLD: 0 K/UL (ref 0–0.01)
NRBC BLD-RTO: 0 PER 100 WBC
PH UR STRIP: 7 (ref 5–8)
PLATELET # BLD AUTO: 261 K/UL (ref 150–400)
PMV BLD AUTO: 11.3 FL (ref 8.9–12.9)
POTASSIUM SERPL-SCNC: 3.9 MMOL/L (ref 3.5–5.1)
PROT UR STRIP-MCNC: NEGATIVE MG/DL
PROTHROMBIN TIME: 13.1 SEC (ref 11.9–14.6)
RBC # BLD AUTO: 4.63 M/UL (ref 4.1–5.7)
RBC #/AREA URNS HPF: ABNORMAL /HPF (ref 0–5)
SODIUM SERPL-SCNC: 140 MMOL/L (ref 136–145)
SP GR UR REFRACTOMETRY: 1.01 (ref 1–1.03)
THERAPEUTIC RANGE: NORMAL SEC (ref 82–109)
URINE CULTURE IF INDICATED: ABNORMAL
UROBILINOGEN UR QL STRIP.AUTO: 0.1 EU/DL (ref 0.1–1)
WBC # BLD AUTO: 4.2 K/UL (ref 4.1–11.1)
WBC URNS QL MICRO: ABNORMAL /HPF (ref 0–4)

## 2023-12-08 PROCEDURE — 93005 ELECTROCARDIOGRAM TRACING: CPT | Performed by: STUDENT IN AN ORGANIZED HEALTH CARE EDUCATION/TRAINING PROGRAM

## 2023-12-08 PROCEDURE — 87086 URINE CULTURE/COLONY COUNT: CPT

## 2023-12-08 PROCEDURE — 80048 BASIC METABOLIC PNL TOTAL CA: CPT

## 2023-12-08 PROCEDURE — 36415 COLL VENOUS BLD VENIPUNCTURE: CPT

## 2023-12-08 PROCEDURE — 85730 THROMBOPLASTIN TIME PARTIAL: CPT

## 2023-12-08 PROCEDURE — 83036 HEMOGLOBIN GLYCOSYLATED A1C: CPT

## 2023-12-08 PROCEDURE — 85610 PROTHROMBIN TIME: CPT

## 2023-12-08 PROCEDURE — 87641 MR-STAPH DNA AMP PROBE: CPT

## 2023-12-08 PROCEDURE — 81001 URINALYSIS AUTO W/SCOPE: CPT

## 2023-12-08 PROCEDURE — 85027 COMPLETE CBC AUTOMATED: CPT

## 2023-12-08 NOTE — PERIOP NOTE
Pt states he was instructed to hold Plavix and Aspirin for one week prior to surgery. Dr. Ca Rees states patient will more than likely stay overnight per Llana Councilman.

## 2023-12-09 LAB
BACTERIA SPEC CULT: NORMAL
Lab: NORMAL

## 2023-12-20 ENCOUNTER — TELEPHONE (OUTPATIENT)
Age: 58
End: 2023-12-20

## 2023-12-20 NOTE — TELEPHONE ENCOUNTER
Patient has appointment on 1/4/23. Surgery was canceled, so need for follow up. Please call patient to cancel.

## 2024-01-03 ENCOUNTER — PREP FOR PROCEDURE (OUTPATIENT)
Age: 59
End: 2024-01-03

## 2024-01-03 DIAGNOSIS — E29.1 MALE HYPOGONADISM: ICD-10-CM

## 2024-01-10 ENCOUNTER — ANESTHESIA EVENT (OUTPATIENT)
Facility: HOSPITAL | Age: 59
End: 2024-01-10
Payer: MEDICARE

## 2024-01-10 ENCOUNTER — ANESTHESIA (OUTPATIENT)
Facility: HOSPITAL | Age: 59
End: 2024-01-10
Payer: MEDICARE

## 2024-01-10 ENCOUNTER — HOSPITAL ENCOUNTER (OUTPATIENT)
Facility: HOSPITAL | Age: 59
Discharge: HOME | End: 2024-01-11
Attending: STUDENT IN AN ORGANIZED HEALTH CARE EDUCATION/TRAINING PROGRAM | Admitting: STUDENT IN AN ORGANIZED HEALTH CARE EDUCATION/TRAINING PROGRAM
Payer: MEDICARE

## 2024-01-10 DIAGNOSIS — N52.9 ERECTILE DYSFUNCTION, UNSPECIFIED ERECTILE DYSFUNCTION TYPE: Primary | ICD-10-CM

## 2024-01-10 PROBLEM — E29.1 ANDROGEN DEFICIENCY: Status: ACTIVE | Noted: 2024-01-10

## 2024-01-10 LAB
ANION GAP SERPL CALC-SCNC: 6 MMOL/L (ref 5–15)
ANION GAP SERPL CALC-SCNC: 6 MMOL/L (ref 5–15)
APPEARANCE UR: CLEAR
BACTERIA URNS QL MICRO: NEGATIVE /HPF
BILIRUB UR QL: NEGATIVE
BUN SERPL-MCNC: 15 MG/DL (ref 6–20)
BUN SERPL-MCNC: 16 MG/DL (ref 6–20)
BUN/CREAT SERPL: 12 (ref 12–20)
BUN/CREAT SERPL: 14 (ref 12–20)
CA-I BLD-MCNC: 1.13 MMOL/L (ref 1.12–1.32)
CA-I BLD-MCNC: 9.6 MG/DL (ref 8.5–10.1)
CA-I BLD-MCNC: 9.7 MG/DL (ref 8.5–10.1)
CHLORIDE BLD-SCNC: 101 MMOL/L (ref 98–107)
CHLORIDE SERPL-SCNC: 101 MMOL/L (ref 97–108)
CHLORIDE SERPL-SCNC: 102 MMOL/L (ref 97–108)
CO2 SERPL-SCNC: 32 MMOL/L (ref 21–32)
CO2 SERPL-SCNC: 32 MMOL/L (ref 21–32)
COLOR UR: NORMAL
CREAT SERPL-MCNC: 1.04 MG/DL (ref 0.7–1.3)
CREAT SERPL-MCNC: 1.31 MG/DL (ref 0.7–1.3)
CREAT UR-MCNC: 0.79 MG/DL (ref 0.6–1.3)
EPITH CASTS URNS QL MICRO: NORMAL /LPF
ERYTHROCYTE [DISTWIDTH] IN BLOOD BY AUTOMATED COUNT: 11.7 % (ref 11.5–14.5)
ERYTHROCYTE [DISTWIDTH] IN BLOOD BY AUTOMATED COUNT: 11.9 % (ref 11.5–14.5)
GLUCOSE BLD STRIP.AUTO-MCNC: 169 MG/DL (ref 65–100)
GLUCOSE BLD STRIP.AUTO-MCNC: 174 MG/DL (ref 65–100)
GLUCOSE BLD STRIP.AUTO-MCNC: 195 MG/DL (ref 65–100)
GLUCOSE BLD STRIP.AUTO-MCNC: 261 MG/DL (ref 65–100)
GLUCOSE BLD STRIP.AUTO-MCNC: 318 MG/DL (ref 65–100)
GLUCOSE SERPL-MCNC: 174 MG/DL (ref 65–100)
GLUCOSE SERPL-MCNC: 182 MG/DL (ref 65–100)
GLUCOSE UR STRIP.AUTO-MCNC: NEGATIVE MG/DL
HCT VFR BLD AUTO: 37.1 % (ref 36.6–50.3)
HCT VFR BLD AUTO: 40.9 % (ref 36.6–50.3)
HGB BLD-MCNC: 12.7 G/DL (ref 12.1–17)
HGB BLD-MCNC: 14 G/DL (ref 12.1–17)
HGB UR QL STRIP: NEGATIVE
KETONES UR QL STRIP.AUTO: NEGATIVE MG/DL
LEUKOCYTE ESTERASE UR QL STRIP.AUTO: NEGATIVE
MCH RBC QN AUTO: 28.7 PG (ref 26–34)
MCH RBC QN AUTO: 29 PG (ref 26–34)
MCHC RBC AUTO-ENTMCNC: 34.2 G/DL (ref 30–36.5)
MCHC RBC AUTO-ENTMCNC: 34.2 G/DL (ref 30–36.5)
MCV RBC AUTO: 83.9 FL (ref 80–99)
MCV RBC AUTO: 84.9 FL (ref 80–99)
MRSA DNA SPEC QL NAA+PROBE: NOT DETECTED
NITRITE UR QL STRIP.AUTO: NEGATIVE
NRBC # BLD: 0 K/UL (ref 0–0.01)
NRBC # BLD: 0 K/UL (ref 0–0.01)
NRBC BLD-RTO: 0 PER 100 WBC
NRBC BLD-RTO: 0 PER 100 WBC
PERFORMED BY:: ABNORMAL
PH UR STRIP: 6 (ref 5–8)
PLATELET # BLD AUTO: 248 K/UL (ref 150–400)
PLATELET # BLD AUTO: 256 K/UL (ref 150–400)
PMV BLD AUTO: 11.5 FL (ref 8.9–12.9)
PMV BLD AUTO: 11.5 FL (ref 8.9–12.9)
POTASSIUM BLD-SCNC: 3.3 MMOL/L (ref 3.5–5.5)
POTASSIUM SERPL-SCNC: 3.4 MMOL/L (ref 3.5–5.1)
POTASSIUM SERPL-SCNC: 3.5 MMOL/L (ref 3.5–5.1)
PROT UR STRIP-MCNC: NEGATIVE MG/DL
RBC # BLD AUTO: 4.42 M/UL (ref 4.1–5.7)
RBC # BLD AUTO: 4.82 M/UL (ref 4.1–5.7)
RBC #/AREA URNS HPF: NORMAL /HPF (ref 0–5)
SODIUM BLD-SCNC: 141 MMOL/L (ref 136–145)
SODIUM SERPL-SCNC: 139 MMOL/L (ref 136–145)
SODIUM SERPL-SCNC: 140 MMOL/L (ref 136–145)
SP GR UR REFRACTOMETRY: 1.01 (ref 1–1.03)
UROBILINOGEN UR QL STRIP.AUTO: 0.1 EU/DL (ref 0.1–1)
WBC # BLD AUTO: 4.5 K/UL (ref 4.1–11.1)
WBC # BLD AUTO: 7.3 K/UL (ref 4.1–11.1)
WBC URNS QL MICRO: NORMAL /HPF (ref 0–4)

## 2024-01-10 PROCEDURE — 2709999900 HC NON-CHARGEABLE SUPPLY: Performed by: STUDENT IN AN ORGANIZED HEALTH CARE EDUCATION/TRAINING PROGRAM

## 2024-01-10 PROCEDURE — 3700000000 HC ANESTHESIA ATTENDED CARE: Performed by: STUDENT IN AN ORGANIZED HEALTH CARE EDUCATION/TRAINING PROGRAM

## 2024-01-10 PROCEDURE — 2580000003 HC RX 258: Performed by: STUDENT IN AN ORGANIZED HEALTH CARE EDUCATION/TRAINING PROGRAM

## 2024-01-10 PROCEDURE — 87086 URINE CULTURE/COLONY COUNT: CPT

## 2024-01-10 PROCEDURE — 81001 URINALYSIS AUTO W/SCOPE: CPT

## 2024-01-10 PROCEDURE — 82962 GLUCOSE BLOOD TEST: CPT

## 2024-01-10 PROCEDURE — 2500000003 HC RX 250 WO HCPCS: Performed by: ANESTHESIOLOGY

## 2024-01-10 PROCEDURE — 3600000013 HC SURGERY LEVEL 3 ADDTL 15MIN: Performed by: STUDENT IN AN ORGANIZED HEALTH CARE EDUCATION/TRAINING PROGRAM

## 2024-01-10 PROCEDURE — 6360000002 HC RX W HCPCS: Performed by: STUDENT IN AN ORGANIZED HEALTH CARE EDUCATION/TRAINING PROGRAM

## 2024-01-10 PROCEDURE — 6370000000 HC RX 637 (ALT 250 FOR IP): Performed by: HOSPITALIST

## 2024-01-10 PROCEDURE — 80048 BASIC METABOLIC PNL TOTAL CA: CPT

## 2024-01-10 PROCEDURE — 7100000001 HC PACU RECOVERY - ADDTL 15 MIN: Performed by: STUDENT IN AN ORGANIZED HEALTH CARE EDUCATION/TRAINING PROGRAM

## 2024-01-10 PROCEDURE — 87641 MR-STAPH DNA AMP PROBE: CPT

## 2024-01-10 PROCEDURE — C1813 PROSTHESIS, PENILE, INFLATAB: HCPCS | Performed by: STUDENT IN AN ORGANIZED HEALTH CARE EDUCATION/TRAINING PROGRAM

## 2024-01-10 PROCEDURE — 6370000000 HC RX 637 (ALT 250 FOR IP): Performed by: NURSE PRACTITIONER

## 2024-01-10 PROCEDURE — 80047 BASIC METABLC PNL IONIZED CA: CPT

## 2024-01-10 PROCEDURE — 6370000000 HC RX 637 (ALT 250 FOR IP): Performed by: ANESTHESIOLOGY

## 2024-01-10 PROCEDURE — 36415 COLL VENOUS BLD VENIPUNCTURE: CPT

## 2024-01-10 PROCEDURE — A4217 STERILE WATER/SALINE, 500 ML: HCPCS | Performed by: STUDENT IN AN ORGANIZED HEALTH CARE EDUCATION/TRAINING PROGRAM

## 2024-01-10 PROCEDURE — 2720000010 HC SURG SUPPLY STERILE: Performed by: STUDENT IN AN ORGANIZED HEALTH CARE EDUCATION/TRAINING PROGRAM

## 2024-01-10 PROCEDURE — 3700000001 HC ADD 15 MINUTES (ANESTHESIA): Performed by: STUDENT IN AN ORGANIZED HEALTH CARE EDUCATION/TRAINING PROGRAM

## 2024-01-10 PROCEDURE — 54405 INSERT MULTI-COMP PENIS PROS: CPT | Performed by: STUDENT IN AN ORGANIZED HEALTH CARE EDUCATION/TRAINING PROGRAM

## 2024-01-10 PROCEDURE — 3600000003 HC SURGERY LEVEL 3 BASE: Performed by: STUDENT IN AN ORGANIZED HEALTH CARE EDUCATION/TRAINING PROGRAM

## 2024-01-10 PROCEDURE — 7100000000 HC PACU RECOVERY - FIRST 15 MIN: Performed by: STUDENT IN AN ORGANIZED HEALTH CARE EDUCATION/TRAINING PROGRAM

## 2024-01-10 PROCEDURE — 85027 COMPLETE CBC AUTOMATED: CPT

## 2024-01-10 PROCEDURE — 2580000003 HC RX 258: Performed by: NURSE PRACTITIONER

## 2024-01-10 PROCEDURE — 6360000002 HC RX W HCPCS: Performed by: NURSE PRACTITIONER

## 2024-01-10 PROCEDURE — 2500000003 HC RX 250 WO HCPCS: Performed by: NURSE PRACTITIONER

## 2024-01-10 DEVICE — AMS 700 PENILE PROSTHESIS, 1 LOW PROFILE RESERVOIR, UP TO 100 ML, INHIBIZONE TREATED
Type: IMPLANTABLE DEVICE | Site: PENIS | Status: FUNCTIONAL
Brand: CONCEAL

## 2024-01-10 DEVICE — NON-STACKABLE REAR TIP EXTENDERS FOR AMS 700 CX AND LGX CYLINDERS
Type: IMPLANTABLE DEVICE | Site: PENIS | Status: FUNCTIONAL
Brand: REAR TIP EXTENDER

## 2024-01-10 DEVICE — INFLATABLE PENILE PROSTHESIS, INHIBIZONE/PRECONNECTED - PENOSCROTAL 1 PUMP 2 CYLINDERS WITH 10 CM LONG PRECONNECT TUBING
Type: IMPLANTABLE DEVICE | Site: PENIS | Status: FUNCTIONAL
Brand: AMS 700 CX MS PUMP

## 2024-01-10 DEVICE — INFLATABLE PENILE PROSTHESIS WITH MS PUMP ACCESSORY KIT
Type: IMPLANTABLE DEVICE | Site: PENIS | Status: FUNCTIONAL
Brand: AMS 700 ACCESSORY KIT

## 2024-01-10 RX ORDER — METOCLOPRAMIDE HYDROCHLORIDE 5 MG/ML
10 INJECTION INTRAMUSCULAR; INTRAVENOUS
Status: DISCONTINUED | OUTPATIENT
Start: 2024-01-10 | End: 2024-01-10 | Stop reason: HOSPADM

## 2024-01-10 RX ORDER — VITAMIN B COMPLEX
2000 TABLET ORAL WEEKLY
Status: DISCONTINUED | OUTPATIENT
Start: 2024-01-10 | End: 2024-01-11 | Stop reason: HOSPADM

## 2024-01-10 RX ORDER — SUCCINYLCHOLINE/SOD CL,ISO/PF 200MG/10ML
SYRINGE (ML) INTRAVENOUS PRN
Status: DISCONTINUED | OUTPATIENT
Start: 2024-01-10 | End: 2024-01-10 | Stop reason: SDUPTHER

## 2024-01-10 RX ORDER — INSULIN LISPRO 100 [IU]/ML
0-4 INJECTION, SOLUTION INTRAVENOUS; SUBCUTANEOUS
Status: DISCONTINUED | OUTPATIENT
Start: 2024-01-10 | End: 2024-01-11

## 2024-01-10 RX ORDER — HYDROCHLOROTHIAZIDE 25 MG/1
25 TABLET ORAL DAILY
Status: DISCONTINUED | OUTPATIENT
Start: 2024-01-11 | End: 2024-01-11 | Stop reason: HOSPADM

## 2024-01-10 RX ORDER — EPHEDRINE SULFATE 50 MG/ML
INJECTION INTRAVENOUS PRN
Status: DISCONTINUED | OUTPATIENT
Start: 2024-01-10 | End: 2024-01-10 | Stop reason: SDUPTHER

## 2024-01-10 RX ORDER — DEXTROSE MONOHYDRATE 100 MG/ML
INJECTION, SOLUTION INTRAVENOUS CONTINUOUS PRN
Status: CANCELLED | OUTPATIENT
Start: 2024-01-10

## 2024-01-10 RX ORDER — LIDOCAINE HYDROCHLORIDE 20 MG/ML
INJECTION, SOLUTION EPIDURAL; INFILTRATION; INTRACAUDAL; PERINEURAL PRN
Status: DISCONTINUED | OUTPATIENT
Start: 2024-01-10 | End: 2024-01-10 | Stop reason: SDUPTHER

## 2024-01-10 RX ORDER — DEXTROSE MONOHYDRATE 100 MG/ML
INJECTION, SOLUTION INTRAVENOUS CONTINUOUS PRN
Status: DISCONTINUED | OUTPATIENT
Start: 2024-01-10 | End: 2024-01-11 | Stop reason: HOSPADM

## 2024-01-10 RX ORDER — ONDANSETRON 2 MG/ML
4 INJECTION INTRAMUSCULAR; INTRAVENOUS
Status: DISCONTINUED | OUTPATIENT
Start: 2024-01-10 | End: 2024-01-10 | Stop reason: HOSPADM

## 2024-01-10 RX ORDER — OXYCODONE HYDROCHLORIDE 5 MG/1
10 TABLET ORAL PRN
Status: COMPLETED | OUTPATIENT
Start: 2024-01-10 | End: 2024-01-10

## 2024-01-10 RX ORDER — HYDROMORPHONE HYDROCHLORIDE 1 MG/ML
0.5 INJECTION, SOLUTION INTRAMUSCULAR; INTRAVENOUS; SUBCUTANEOUS EVERY 5 MIN PRN
Status: DISCONTINUED | OUTPATIENT
Start: 2024-01-10 | End: 2024-01-10 | Stop reason: HOSPADM

## 2024-01-10 RX ORDER — LABETALOL 100 MG/1
100 TABLET, FILM COATED ORAL 2 TIMES DAILY
Status: DISCONTINUED | OUTPATIENT
Start: 2024-01-10 | End: 2024-01-11 | Stop reason: HOSPADM

## 2024-01-10 RX ORDER — FENTANYL CITRATE 50 UG/ML
50 INJECTION, SOLUTION INTRAMUSCULAR; INTRAVENOUS EVERY 5 MIN PRN
Status: DISCONTINUED | OUTPATIENT
Start: 2024-01-10 | End: 2024-01-10 | Stop reason: HOSPADM

## 2024-01-10 RX ORDER — FENTANYL CITRATE 50 UG/ML
INJECTION, SOLUTION INTRAMUSCULAR; INTRAVENOUS PRN
Status: DISCONTINUED | OUTPATIENT
Start: 2024-01-10 | End: 2024-01-10 | Stop reason: SDUPTHER

## 2024-01-10 RX ORDER — HYDRALAZINE HYDROCHLORIDE 20 MG/ML
10 INJECTION INTRAMUSCULAR; INTRAVENOUS
Status: DISCONTINUED | OUTPATIENT
Start: 2024-01-10 | End: 2024-01-10 | Stop reason: HOSPADM

## 2024-01-10 RX ORDER — PROPOFOL 10 MG/ML
INJECTION, EMULSION INTRAVENOUS PRN
Status: DISCONTINUED | OUTPATIENT
Start: 2024-01-10 | End: 2024-01-10 | Stop reason: SDUPTHER

## 2024-01-10 RX ORDER — MEPERIDINE HYDROCHLORIDE 25 MG/ML
12.5 INJECTION INTRAMUSCULAR; INTRAVENOUS; SUBCUTANEOUS EVERY 5 MIN PRN
Status: DISCONTINUED | OUTPATIENT
Start: 2024-01-10 | End: 2024-01-10 | Stop reason: HOSPADM

## 2024-01-10 RX ORDER — GLUCAGON 1 MG/ML
1 KIT INJECTION PRN
Status: CANCELLED | OUTPATIENT
Start: 2024-01-10

## 2024-01-10 RX ORDER — IPRATROPIUM BROMIDE AND ALBUTEROL SULFATE 2.5; .5 MG/3ML; MG/3ML
1 SOLUTION RESPIRATORY (INHALATION)
Status: DISCONTINUED | OUTPATIENT
Start: 2024-01-10 | End: 2024-01-10 | Stop reason: HOSPADM

## 2024-01-10 RX ORDER — ONDANSETRON 2 MG/ML
4 INJECTION INTRAMUSCULAR; INTRAVENOUS EVERY 6 HOURS PRN
Status: DISCONTINUED | OUTPATIENT
Start: 2024-01-10 | End: 2024-01-11 | Stop reason: HOSPADM

## 2024-01-10 RX ORDER — SODIUM CHLORIDE 9 MG/ML
INJECTION, SOLUTION INTRAVENOUS PRN
Status: DISCONTINUED | OUTPATIENT
Start: 2024-01-10 | End: 2024-01-10 | Stop reason: HOSPADM

## 2024-01-10 RX ORDER — OXYCODONE HYDROCHLORIDE 5 MG/1
5 TABLET ORAL EVERY 4 HOURS PRN
Status: DISCONTINUED | OUTPATIENT
Start: 2024-01-10 | End: 2024-01-11 | Stop reason: HOSPADM

## 2024-01-10 RX ORDER — SODIUM CHLORIDE, SODIUM LACTATE, POTASSIUM CHLORIDE, CALCIUM CHLORIDE 600; 310; 30; 20 MG/100ML; MG/100ML; MG/100ML; MG/100ML
INJECTION, SOLUTION INTRAVENOUS ONCE
Status: DISCONTINUED | OUTPATIENT
Start: 2024-01-10 | End: 2024-01-10 | Stop reason: HOSPADM

## 2024-01-10 RX ORDER — INSULIN GLARGINE 100 [IU]/ML
30 INJECTION, SOLUTION SUBCUTANEOUS NIGHTLY
Status: DISCONTINUED | OUTPATIENT
Start: 2024-01-10 | End: 2024-01-11 | Stop reason: HOSPADM

## 2024-01-10 RX ORDER — INSULIN LISPRO 100 [IU]/ML
0-4 INJECTION, SOLUTION INTRAVENOUS; SUBCUTANEOUS NIGHTLY
Status: DISCONTINUED | OUTPATIENT
Start: 2024-01-10 | End: 2024-01-11

## 2024-01-10 RX ORDER — DEXAMETHASONE SODIUM PHOSPHATE 4 MG/ML
INJECTION, SOLUTION INTRA-ARTICULAR; INTRALESIONAL; INTRAMUSCULAR; INTRAVENOUS; SOFT TISSUE PRN
Status: DISCONTINUED | OUTPATIENT
Start: 2024-01-10 | End: 2024-01-10 | Stop reason: SDUPTHER

## 2024-01-10 RX ORDER — GLUCAGON 1 MG/ML
1 KIT INJECTION PRN
Status: DISCONTINUED | OUTPATIENT
Start: 2024-01-10 | End: 2024-01-10 | Stop reason: HOSPADM

## 2024-01-10 RX ORDER — HYDROMORPHONE HYDROCHLORIDE 1 MG/ML
INJECTION, SOLUTION INTRAMUSCULAR; INTRAVENOUS; SUBCUTANEOUS PRN
Status: DISCONTINUED | OUTPATIENT
Start: 2024-01-10 | End: 2024-01-10 | Stop reason: SDUPTHER

## 2024-01-10 RX ORDER — LOSARTAN POTASSIUM 50 MG/1
100 TABLET ORAL DAILY
Status: DISCONTINUED | OUTPATIENT
Start: 2024-01-10 | End: 2024-01-11 | Stop reason: HOSPADM

## 2024-01-10 RX ORDER — DEXTROSE MONOHYDRATE 100 MG/ML
INJECTION, SOLUTION INTRAVENOUS CONTINUOUS PRN
Status: DISCONTINUED | OUTPATIENT
Start: 2024-01-10 | End: 2024-01-10 | Stop reason: HOSPADM

## 2024-01-10 RX ORDER — OXYCODONE HYDROCHLORIDE 5 MG/1
5 TABLET ORAL PRN
Status: COMPLETED | OUTPATIENT
Start: 2024-01-10 | End: 2024-01-10

## 2024-01-10 RX ORDER — SODIUM CHLORIDE 0.9 % (FLUSH) 0.9 %
5-40 SYRINGE (ML) INJECTION PRN
Status: DISCONTINUED | OUTPATIENT
Start: 2024-01-10 | End: 2024-01-10 | Stop reason: HOSPADM

## 2024-01-10 RX ORDER — ACETAMINOPHEN 325 MG/1
650 TABLET ORAL EVERY 4 HOURS PRN
Status: DISCONTINUED | OUTPATIENT
Start: 2024-01-10 | End: 2024-01-11 | Stop reason: HOSPADM

## 2024-01-10 RX ORDER — SODIUM CHLORIDE, SODIUM LACTATE, POTASSIUM CHLORIDE, CALCIUM CHLORIDE 600; 310; 30; 20 MG/100ML; MG/100ML; MG/100ML; MG/100ML
INJECTION, SOLUTION INTRAVENOUS CONTINUOUS
Status: DISCONTINUED | OUTPATIENT
Start: 2024-01-10 | End: 2024-01-10 | Stop reason: HOSPADM

## 2024-01-10 RX ORDER — LABETALOL HYDROCHLORIDE 5 MG/ML
10 INJECTION, SOLUTION INTRAVENOUS
Status: DISCONTINUED | OUTPATIENT
Start: 2024-01-10 | End: 2024-01-10 | Stop reason: HOSPADM

## 2024-01-10 RX ORDER — GLUCAGON 1 MG/ML
1 KIT INJECTION PRN
Status: DISCONTINUED | OUTPATIENT
Start: 2024-01-10 | End: 2024-01-11 | Stop reason: HOSPADM

## 2024-01-10 RX ORDER — SODIUM CHLORIDE 0.9 % (FLUSH) 0.9 %
5-40 SYRINGE (ML) INJECTION EVERY 12 HOURS SCHEDULED
Status: DISCONTINUED | OUTPATIENT
Start: 2024-01-10 | End: 2024-01-10 | Stop reason: HOSPADM

## 2024-01-10 RX ORDER — SODIUM CHLORIDE 450 MG/100ML
INJECTION, SOLUTION INTRAVENOUS CONTINUOUS
Status: DISCONTINUED | OUTPATIENT
Start: 2024-01-10 | End: 2024-01-11 | Stop reason: HOSPADM

## 2024-01-10 RX ORDER — MIDAZOLAM HYDROCHLORIDE 2 MG/2ML
INJECTION, SOLUTION INTRAMUSCULAR; INTRAVENOUS PRN
Status: DISCONTINUED | OUTPATIENT
Start: 2024-01-10 | End: 2024-01-10 | Stop reason: SDUPTHER

## 2024-01-10 RX ORDER — ONDANSETRON 2 MG/ML
INJECTION INTRAMUSCULAR; INTRAVENOUS PRN
Status: DISCONTINUED | OUTPATIENT
Start: 2024-01-10 | End: 2024-01-10 | Stop reason: SDUPTHER

## 2024-01-10 RX ORDER — PHENYLEPHRINE HCL IN 0.9% NACL 1 MG/10 ML
SYRINGE (ML) INTRAVENOUS PRN
Status: DISCONTINUED | OUTPATIENT
Start: 2024-01-10 | End: 2024-01-10 | Stop reason: SDUPTHER

## 2024-01-10 RX ORDER — ATORVASTATIN CALCIUM 40 MG/1
40 TABLET, FILM COATED ORAL DAILY
Status: DISCONTINUED | OUTPATIENT
Start: 2024-01-10 | End: 2024-01-11 | Stop reason: HOSPADM

## 2024-01-10 RX ORDER — DIPHENHYDRAMINE HYDROCHLORIDE 50 MG/ML
12.5 INJECTION INTRAMUSCULAR; INTRAVENOUS
Status: DISCONTINUED | OUTPATIENT
Start: 2024-01-10 | End: 2024-01-10 | Stop reason: HOSPADM

## 2024-01-10 RX ORDER — BUPIVACAINE HYDROCHLORIDE 2.5 MG/ML
INJECTION, SOLUTION EPIDURAL; INFILTRATION; INTRACAUDAL PRN
Status: DISCONTINUED | OUTPATIENT
Start: 2024-01-10 | End: 2024-01-10 | Stop reason: ALTCHOICE

## 2024-01-10 RX ADMIN — SODIUM CHLORIDE, POTASSIUM CHLORIDE, SODIUM LACTATE AND CALCIUM CHLORIDE: 600; 310; 30; 20 INJECTION, SOLUTION INTRAVENOUS at 06:59

## 2024-01-10 RX ADMIN — EPHEDRINE SULFATE 10 MG: 50 INJECTION INTRAVENOUS at 08:23

## 2024-01-10 RX ADMIN — LOSARTAN POTASSIUM 100 MG: 50 TABLET, FILM COATED ORAL at 20:04

## 2024-01-10 RX ADMIN — ONDANSETRON 4 MG: 2 INJECTION INTRAMUSCULAR; INTRAVENOUS at 07:54

## 2024-01-10 RX ADMIN — FENTANYL CITRATE 50 MCG: 50 INJECTION, SOLUTION INTRAMUSCULAR; INTRAVENOUS at 08:08

## 2024-01-10 RX ADMIN — GENTAMICIN SULFATE 320 MG: 40 INJECTION, SOLUTION INTRAMUSCULAR; INTRAVENOUS at 06:59

## 2024-01-10 RX ADMIN — INSULIN GLARGINE 30 UNITS: 100 INJECTION, SOLUTION SUBCUTANEOUS at 21:35

## 2024-01-10 RX ADMIN — EPHEDRINE SULFATE 5 MG: 50 INJECTION INTRAVENOUS at 08:15

## 2024-01-10 RX ADMIN — OXYCODONE HYDROCHLORIDE 10 MG: 5 TABLET ORAL at 10:26

## 2024-01-10 RX ADMIN — CEFAZOLIN 2000 MG: 1 INJECTION, POWDER, FOR SOLUTION INTRAMUSCULAR; INTRAVENOUS at 16:36

## 2024-01-10 RX ADMIN — EPHEDRINE SULFATE 5 MG: 50 INJECTION INTRAVENOUS at 09:26

## 2024-01-10 RX ADMIN — LIDOCAINE HYDROCHLORIDE 100 MG: 20 INJECTION, SOLUTION EPIDURAL; INFILTRATION; INTRACAUDAL; PERINEURAL at 07:41

## 2024-01-10 RX ADMIN — Medication 100 MCG: at 07:58

## 2024-01-10 RX ADMIN — EPHEDRINE SULFATE 10 MG: 50 INJECTION INTRAVENOUS at 07:55

## 2024-01-10 RX ADMIN — Medication 160 MG: at 07:47

## 2024-01-10 RX ADMIN — DEXAMETHASONE SODIUM PHOSPHATE 4 MG: 4 INJECTION INTRA-ARTICULAR; INTRALESIONAL; INTRAMUSCULAR; INTRAVENOUS; SOFT TISSUE at 07:54

## 2024-01-10 RX ADMIN — Medication 100 MCG: at 09:03

## 2024-01-10 RX ADMIN — HYDROMORPHONE HYDROCHLORIDE 0.5 MG: 1 INJECTION, SOLUTION INTRAMUSCULAR; INTRAVENOUS; SUBCUTANEOUS at 10:11

## 2024-01-10 RX ADMIN — Medication 100 MCG: at 08:15

## 2024-01-10 RX ADMIN — Medication 100 MCG: at 09:26

## 2024-01-10 RX ADMIN — PROPOFOL 150 MG: 10 INJECTION, EMULSION INTRAVENOUS at 07:46

## 2024-01-10 RX ADMIN — HYDROMORPHONE HYDROCHLORIDE 0.4 MG: 1 INJECTION, SOLUTION INTRAMUSCULAR; INTRAVENOUS; SUBCUTANEOUS at 08:36

## 2024-01-10 RX ADMIN — Medication 100 MCG: at 08:23

## 2024-01-10 RX ADMIN — CEFAZOLIN SODIUM 2000 MG: 1 INJECTION, POWDER, FOR SOLUTION INTRAMUSCULAR; INTRAVENOUS at 07:51

## 2024-01-10 RX ADMIN — INSULIN LISPRO 4 UNITS: 100 INJECTION, SOLUTION INTRAVENOUS; SUBCUTANEOUS at 20:03

## 2024-01-10 RX ADMIN — FENTANYL CITRATE 50 MCG: 50 INJECTION, SOLUTION INTRAMUSCULAR; INTRAVENOUS at 07:41

## 2024-01-10 RX ADMIN — LABETALOL HYDROCHLORIDE 100 MG: 100 TABLET, FILM COATED ORAL at 20:03

## 2024-01-10 RX ADMIN — INSULIN LISPRO 2 UNITS: 100 INJECTION, SOLUTION INTRAVENOUS; SUBCUTANEOUS at 16:36

## 2024-01-10 RX ADMIN — OXYCODONE HYDROCHLORIDE 5 MG: 5 TABLET ORAL at 20:02

## 2024-01-10 RX ADMIN — ATORVASTATIN CALCIUM 40 MG: 40 TABLET, FILM COATED ORAL at 20:03

## 2024-01-10 RX ADMIN — EPHEDRINE SULFATE 10 MG: 50 INJECTION INTRAVENOUS at 08:03

## 2024-01-10 RX ADMIN — Medication 2000 UNITS: at 20:03

## 2024-01-10 RX ADMIN — OXYCODONE HYDROCHLORIDE 5 MG: 5 TABLET ORAL at 13:45

## 2024-01-10 RX ADMIN — MIDAZOLAM HYDROCHLORIDE 2 MG: 1 INJECTION, SOLUTION INTRAMUSCULAR; INTRAVENOUS at 07:39

## 2024-01-10 RX ADMIN — SODIUM CHLORIDE: 4.5 INJECTION, SOLUTION INTRAVENOUS at 10:53

## 2024-01-10 RX ADMIN — Medication 100 MCG: at 08:03

## 2024-01-10 RX ADMIN — EPHEDRINE SULFATE 10 MG: 50 INJECTION INTRAVENOUS at 09:03

## 2024-01-10 ASSESSMENT — PAIN DESCRIPTION - DESCRIPTORS
DESCRIPTORS: SORE
DESCRIPTORS: ACHING;DISCOMFORT
DESCRIPTORS: ACHING;DISCOMFORT

## 2024-01-10 ASSESSMENT — PAIN DESCRIPTION - LOCATION
LOCATION: PENIS
LOCATION: GROIN;PENIS
LOCATION: PENIS

## 2024-01-10 ASSESSMENT — PAIN SCALES - GENERAL
PAINLEVEL_OUTOF10: 7
PAINLEVEL_OUTOF10: 8
PAINLEVEL_OUTOF10: 8
PAINLEVEL_OUTOF10: 5
PAINLEVEL_OUTOF10: 5

## 2024-01-10 ASSESSMENT — PAIN - FUNCTIONAL ASSESSMENT: PAIN_FUNCTIONAL_ASSESSMENT: 0-10

## 2024-01-10 ASSESSMENT — PAIN SCALES - WONG BAKER
WONGBAKER_NUMERICALRESPONSE: 2
WONGBAKER_NUMERICALRESPONSE: 2

## 2024-01-10 NOTE — OP NOTE
Operative Note      Patient: José Barrera  YOB: 1965  MRN: [id number]    Date of Procedure: 1/10/2024    Pre-Op Diagnosis Codes:     * Male hypogonadism [E29.1]    Post-Op Diagnosis: Same       Procedure(s):  INSERTION OF INFLATABLE PENILE PROSTHESIS    Surgeon(s):  Julian Mitchell MD    Assistant:   Surgical Assistant: Esequiel Weeks    Anesthesia: General    Estimated Blood Loss (mL): less than 50     Complications: None    Specimens:   * No specimens in log *    Implants:  Implant Name Type Inv. Item Serial No.  Lot No. LRB No. Used Action   KIT PENILE PROS W/ SUT TIE CONN CLLT 22GA AND 15GA NDL FOR - [id number]  KIT PENILE PROS W/ SUT TIE CONN CLLT 22GA AND 15GA NDL FOR  [facility] [id number] N/A 1 Implanted   PROSTHESIS PENILE RESVR 100ML FLAT INFL W/ MS  PRECONN - [id number]  PROSTHESIS PENILE RESVR 100ML FLAT INFL W/ MS  PRECONN  [email or web address] [id number] N/A 1 Implanted   IMPL PENILE PRECONNECT MS 18CM TL IZ - [id number]  IMPL PENILE PRECONNECT MS 18CM TL IZ  [email or web address] [id number] N/A 1 Implanted   EXTENDER PENILE PROS TIP L2CM CONCEALABLE SNAP FIT FOR - [id number]  EXTENDER PENILE PROS TIP L2CM CONCEALABLE SNAP FIT FOR  [employer] [email or web address] [id number] N/A 1 Implanted         Drains:   Closed/Suction Drain Groin Bulb (Active)   Site Description Clean, dry & intact 01/10/24 0946   Dressing Status New dressing applied 01/10/24 0946   Drainage Appearance Bloody 01/10/24 0946   Drain Status To bulb suction 01/10/24 0946       Urinary Catheter 01/10/24 (Active)   $ Urethral catheter insertion Inserted for procedure 01/10/24 0946   Catheter Indications Perioperative use for selected surgical procedures 01/10/24 0946   Urine Color Yellow 01/10/24 0946   Urine Appearance Clear 01/10/24 0946   Collection Container Standard 01/10/24 0946   Securement Method Tape 01/10/24 0946   Catheter Best Practices  Drainage tube clipped to bed;Catheter secured to  drain was then placed through the skin and into the incision for drainage overnight.  The tubing was then buried in the dartos fascia was closed with a running 3-0 Vicryl suture.  The skin was closed in a running fashion with a 3-0 Monocryl suture.  The strings were then removed from the cylinders through the glans and Dermabond was used to obtain hemostasis of these puncture wounds along with placement of Dermabond on the penoscrotal incision.  The penis was then wrapped in a mummy dressing and the Avila catheter was placed to gravity drainage to Avila catheter bag.  This concluded the procedure.  The patient tolerated the procedure well without complications and was transported to PACU in stable condition.  He will be monitored overnight and the Avila catheter will be removed in the morning along with the CASSIDY drain as long as the output remains low.       Electronically signed by Julian Mitchell MD on 1/10/2024 at 11:13 AM

## 2024-01-10 NOTE — PROGRESS NOTES
4 Eyes Skin Assessment     NAME:  José Barrera  YOB: 1965  MEDICAL RECORD NUMBER:  [id number]    The patient is being assessed for  Admission    I agree that at least one RN has performed a thorough Head to Toe Skin Assessment on the patient. ALL assessment sites listed below have been assessed.      Areas assessed by both nurses:    Head, Face, Ears, Shoulders, Back, Chest, Arms, Elbows, Hands, Sacrum. Buttock, Coccyx, Ischium, Legs. Feet and Heels, and Under Medical Devices         Does the Patient have a Wound? No noted wound(s)       Fidel Prevention initiated by RN: No  Wound Care Orders initiated by RN: No    Pressure Injury (Stage 3,4, Unstageable, DTI, NWPT, and Complex wounds) if present, place Wound referral order by RN under : No    New Ostomies, if present place, Ostomy referral order under : No     Nurse 1 eSignature: Electronically signed by Heriberto Atkins RN on 1/10/24 at 10:53 AM EST    **SHARE this note so that the co-signing nurse can place an eSignature**    Nurse 2 eSignature: Electronically signed by Cayla Johnson RN on 1/10/24 at 10:57 AM EST

## 2024-01-10 NOTE — H&P
UROLOGY HISTORY AND PHYSICAL  Julian Mitchell MD  [phone] Office    Patient: José Barrera MRN: [id number]  SSN: [id number]    YOB: 1965  Age: 58 y.o.  Sex: male          Date of Encounter:  January 10, 2024  Chief Complaint:  Erectile Dysfunction             History of Present Illness:  Patient is a 58 y.o. male here for surgery.    No new issues since last visit. Underwent pre-operative cardiac workup and passed without issue.        Past Medical History:  No Known Allergies   Prior to Admission medications    Medication Sig Start Date End Date Taking? Authorizing Provider   aspirin 81 MG EC tablet Take 1 tablet by mouth daily    Lady Smith MD   Cholecalciferol (VITAMIN D) 50 MCG (2000 UT) CAPS capsule Take 1 capsule by mouth once a week    Lady Smith MD   lidocaine (LIDODERM) 5 % APPLY 1 PATCH TO THE AFFECTED AREA FOR 12 HOURS (ON FOR 12 HOURS THEN OFF FOR 12 HOURS)  Patient not taking: Reported on 8/31/2023 8/25/23   Samia Pack MD   HUMULIN N 100 UNIT/ML injection vial INJECT 10 UNITS SUBCUTANEOUSLY ONCE DAILY AND INJECT per sliding scale THREE TIMES DAILY BEFORE MEALS (max OF 12 UNITS PER DOSE AND 36 UNITS DAILY) 6/8/23   Lady Smith MD   GNP ULTICARE PEN NEEDLES 32G X 4 MM MISC USE 1 needle SUBCUTANEOUSLY THREE TIMES DAILY TO administer insulin 6/23/23   Lady Smith MD   Lancets (ONETOUCH DELICA PLUS [id number]) MISC use to check blood sugar FOUR TIMES DAILY 6/8/23   Samia Pack MD   atorvastatin (LIPITOR) 40 MG tablet Take 1 tablet by mouth daily 3/24/23   Lady Smith MD   clopidogrel (PLAVIX) 75 MG tablet Take 1 tablet by mouth daily 3/24/23   Lady Smith MD   ONETOUCH VERIO strip USE TO CHECK BLOOD SUGAR THREE TIMES DAILY AS DIRECTED 5/9/23   Lady Smith MD   hydroCHLOROthiazide (HYDRODIURIL) 25 MG tablet TAKE 1 TABLET BY MOUTH ONCE A DAY FOR BLOOD PRESSURE CONTROL 3/30/23

## 2024-01-10 NOTE — ANESTHESIA POSTPROCEDURE EVALUATION
Department of Anesthesiology  Postprocedure Note    Patient: José Barrera  MRN: [id number]  YOB: 1965  Date of evaluation: 1/10/2024    Procedure Summary       Date: 01/10/24 Room / Location: [facility] MAIN OR  / [facility] MAIN OR    Anesthesia Start: 0739 Anesthesia Stop: 0941    Procedure: INSERTION OF INFLATABLE PENILE PROSTHESIS (Penis) Diagnosis:       Male hypogonadism      (Male hypogonadism [E29.1])    Surgeons: Julian Mitchell MD Responsible Provider: Mary Sheth MD    Anesthesia Type: general ASA Status: 3            Anesthesia Type: No value filed.    Jose Phase I: Jose Score: 9    Jose Phase II:      Anesthesia Post Evaluation    Patient location during evaluation: PACU  Patient participation: complete - patient participated  Level of consciousness: awake  Airway patency: patent  Nausea & Vomiting: no nausea and no vomiting  Cardiovascular status: hemodynamically stable  Respiratory status: acceptable  Hydration status: euvolemic  Pain management: adequate    No notable events documented.

## 2024-01-10 NOTE — PROGRESS NOTES
4 Eyes Skin Assessment     NAME:  José Barrera  YOB: 1965  MEDICAL RECORD NUMBER:  [id number]    The patient is being assessed for  Other Dual Skin    I agree that at least one RN has performed a thorough Head to Toe Skin Assessment on the patient. ALL assessment sites listed below have been assessed.      Areas assessed by both nurses:    Head, Face, Ears, Shoulders, Back, Chest, Arms, Elbows, Hands, Sacrum. Buttock, Coccyx, Ischium, Legs. Feet and Heels, and Under Medical Devices         Does the Patient have a Wound? No noted wound(s)       Fidel Prevention initiated by RN: No  Wound Care Orders initiated by RN: No    Pressure Injury (Stage 3,4, Unstageable, DTI, NWPT, and Complex wounds) if present, place Wound referral order by RN under : No    New Ostomies, if present place, Ostomy referral order under : No     Nurse 1 eSignature: Electronically signed by Heriberto Atkins RN on 1/10/24 at 1:56 PM EST    **SHARE this note so that the co-signing nurse can place an eSignature**    Nurse 2 eSignature: {Esignature:[id number]}

## 2024-01-10 NOTE — PLAN OF CARE
Problem: Pain  Goal: Verbalizes/displays adequate comfort level or baseline comfort level  Outcome: Not Progressing    Problem: Safety - Adult  Goal: Free from fall injury  Outcome: Progressing     Problem: ABCDS Injury Assessment  Goal: Absence of physical injury  Outcome: Progressing     Problem: Discharge Planning  Goal: Discharge to home or other facility with appropriate resources  Outcome: Progressing

## 2024-01-10 NOTE — ANESTHESIA PRE PROCEDURE
Department of Anesthesiology  Preprocedure Note       Name:  José Barrera   Age:  58 y.o.  :  1965                                          MRN:  [id number]         Date:  1/10/2024      Surgeon: Surgeon(s):  Julian Mitchell MD    Procedure: Procedure(s):  INSERTION OF INFLATABLE PENILE PROSTHESIS    Medications prior to admission:   Prior to Admission medications    Medication Sig Start Date End Date Taking? Authorizing Provider   aspirin 81 MG EC tablet Take 1 tablet by mouth daily    Lady Smith MD   Cholecalciferol (VITAMIN D) 50 MCG (2000) CAPS capsule Take 1 capsule by mouth once a week    Lady Smith MD   lidocaine (LIDODERM) 5 % APPLY 1 PATCH TO THE AFFECTED AREA FOR 12 HOURS (ON FOR 12 HOURS THEN OFF FOR 12 HOURS)  Patient not taking: Reported on 2023   Samia Pack MD   HUMULIN N 100 UNIT/ML injection vial INJECT 10 UNITS SUBCUTANEOUSLY ONCE DAILY AND INJECT per sliding scale THREE TIMES DAILY BEFORE MEALS (max OF 12 UNITS PER DOSE AND 36 UNITS DAILY) 23   Lady Smith MD   GNP ULTICARE PEN NEEDLES 32G X 4 MM MISC USE 1 needle SUBCUTANEOUSLY THREE TIMES DAILY TO administer insulin 23   Lady Smith MD   Lancets (ONETOUCH DELICA PLUS [id number]) MISC use to check blood sugar FOUR TIMES DAILY 23   Samia Pack MD   atorvastatin (LIPITOR) 40 MG tablet Take 1 tablet by mouth daily 3/24/23   Lady Smith MD   clopidogrel (PLAVIX) 75 MG tablet Take 1 tablet by mouth daily 3/24/23   Lady Smith MD   ONETOUCH VERIO strip USE TO CHECK BLOOD SUGAR THREE TIMES DAILY AS DIRECTED 23   Lady Smith MD   hydroCHLOROthiazide (HYDRODIURIL) 25 MG tablet TAKE 1 TABLET BY MOUTH ONCE A DAY FOR BLOOD PRESSURE CONTROL 3/30/23   Lady Smith MD   NOVOLOG FLEXPEN 100 UNIT/ML injection pen Inject 10 unit subcutaneously before each meal. (50 DAY supply) 5/10/23   Lady Smith MD   NOVOLOG

## 2024-01-10 NOTE — DISCHARGE INSTRUCTIONS
Penile Prosthesis Postop Care Instructions  Follow these guidelines for your care after your surgery to help with your recovery after your penile  implant.  Activity  Limit your activity for the first 5 days after surgery to light activity.  Limit lifting, pushing or pulling to less than 20 pounds for the next 2 weeks  Also limit running and long walks  Swelling  Scrotal swelling from the surgery may take weeks to get better. You should call your doctor if the  swelling is severe and the scrotum is larger than an orange.  Use ice packs to the scrotum and penis for 15 minutes every hour for the first 48 hours when you  are awake to limit swelling. Use a plastic bag with ice or a bag of frozen peas for the ice pack.  Wrap a cloth or towel around the ice pack so the ice does not directly touch your skin.  Wear a jock strap or tight underwear for the next week to support your scrotum and reduce  swelling.  Incision Care  Stitches will dissolve and do not need to be removed. Expect a small amount of blood may stain  the dressings for up to 72 hours after surgery.  For the first few days, apply two or three gauze pads to the site each day and as needed to keep  the dressing dry. This will protect the incision and help keep your clothes clean.  When you are no longer having any drainage, stop using the gauze pads over the site.  Bathing or Showering  You may shower 48 to 72 hours after surgery. Allow the water to wash over the incision but do  not scrub the incision. Dry the site gently by patting it with a clean towel.  Tub baths should be avoided for 7 days after surgery.  Swimming or hot tubs should be avoided for 14 days after surgery.  Pain Control  You might be sent home with a prescription for a few days of pain medicine. Use this only as  needed. After 48 hours, most patients can take extra strength Tylenol (acetaminophen) or Advil  (ibuprofen) for pain, following the label directions. Pain most often is eased

## 2024-01-11 VITALS
TEMPERATURE: 97.9 F | OXYGEN SATURATION: 98 % | RESPIRATION RATE: 18 BRPM | HEIGHT: 69 IN | WEIGHT: 230 LBS | DIASTOLIC BLOOD PRESSURE: 75 MMHG | SYSTOLIC BLOOD PRESSURE: 111 MMHG | HEART RATE: 71 BPM | BODY MASS INDEX: 34.07 KG/M2

## 2024-01-11 LAB
ANION GAP SERPL CALC-SCNC: 4 MMOL/L (ref 5–15)
BACTERIA SPEC CULT: NORMAL
BUN SERPL-MCNC: 17 MG/DL (ref 6–20)
BUN/CREAT SERPL: 15 (ref 12–20)
CA-I BLD-MCNC: 9 MG/DL (ref 8.5–10.1)
CHLORIDE SERPL-SCNC: 102 MMOL/L (ref 97–108)
CO2 SERPL-SCNC: 33 MMOL/L (ref 21–32)
CREAT SERPL-MCNC: 1.14 MG/DL (ref 0.7–1.3)
ERYTHROCYTE [DISTWIDTH] IN BLOOD BY AUTOMATED COUNT: 11.9 % (ref 11.5–14.5)
GLUCOSE BLD STRIP.AUTO-MCNC: 186 MG/DL (ref 65–100)
GLUCOSE BLD STRIP.AUTO-MCNC: 259 MG/DL (ref 65–100)
GLUCOSE SERPL-MCNC: 192 MG/DL (ref 65–100)
HCT VFR BLD AUTO: 37.1 % (ref 36.6–50.3)
HGB BLD-MCNC: 12.6 G/DL (ref 12.1–17)
Lab: NORMAL
MCH RBC QN AUTO: 28.6 PG (ref 26–34)
MCHC RBC AUTO-ENTMCNC: 34 G/DL (ref 30–36.5)
MCV RBC AUTO: 84.3 FL (ref 80–99)
NRBC # BLD: 0 K/UL (ref 0–0.01)
NRBC BLD-RTO: 0 PER 100 WBC
PERFORMED BY:: ABNORMAL
PERFORMED BY:: ABNORMAL
PLATELET # BLD AUTO: 242 K/UL (ref 150–400)
PMV BLD AUTO: 11.2 FL (ref 8.9–12.9)
POTASSIUM SERPL-SCNC: 3.5 MMOL/L (ref 3.5–5.1)
RBC # BLD AUTO: 4.4 M/UL (ref 4.1–5.7)
SODIUM SERPL-SCNC: 139 MMOL/L (ref 136–145)
WBC # BLD AUTO: 7.9 K/UL (ref 4.1–11.1)

## 2024-01-11 PROCEDURE — 85027 COMPLETE CBC AUTOMATED: CPT

## 2024-01-11 PROCEDURE — 6370000000 HC RX 637 (ALT 250 FOR IP): Performed by: NURSE PRACTITIONER

## 2024-01-11 PROCEDURE — 82962 GLUCOSE BLOOD TEST: CPT

## 2024-01-11 PROCEDURE — 36415 COLL VENOUS BLD VENIPUNCTURE: CPT

## 2024-01-11 PROCEDURE — 6360000002 HC RX W HCPCS: Performed by: STUDENT IN AN ORGANIZED HEALTH CARE EDUCATION/TRAINING PROGRAM

## 2024-01-11 PROCEDURE — 2580000003 HC RX 258: Performed by: STUDENT IN AN ORGANIZED HEALTH CARE EDUCATION/TRAINING PROGRAM

## 2024-01-11 PROCEDURE — 6370000000 HC RX 637 (ALT 250 FOR IP): Performed by: STUDENT IN AN ORGANIZED HEALTH CARE EDUCATION/TRAINING PROGRAM

## 2024-01-11 PROCEDURE — 80048 BASIC METABOLIC PNL TOTAL CA: CPT

## 2024-01-11 RX ORDER — FLUCONAZOLE 150 MG/1
150 TABLET ORAL DAILY
Qty: 3 TABLET | Refills: 0 | Status: SHIPPED | OUTPATIENT
Start: 2024-01-11 | End: 2024-01-14

## 2024-01-11 RX ORDER — OXYCODONE HYDROCHLORIDE 5 MG/1
5 TABLET ORAL EVERY 6 HOURS PRN
Qty: 12 TABLET | Refills: 0 | Status: SHIPPED | OUTPATIENT
Start: 2024-01-11 | End: 2024-01-14

## 2024-01-11 RX ORDER — INSULIN LISPRO 100 [IU]/ML
10 INJECTION, SOLUTION INTRAVENOUS; SUBCUTANEOUS
Status: DISCONTINUED | OUTPATIENT
Start: 2024-01-11 | End: 2024-01-11 | Stop reason: HOSPADM

## 2024-01-11 RX ORDER — SULFAMETHOXAZOLE AND TRIMETHOPRIM 800; 160 MG/1; MG/1
1 TABLET ORAL 2 TIMES DAILY
Qty: 10 TABLET | Refills: 0 | Status: SHIPPED | OUTPATIENT
Start: 2024-01-11 | End: 2024-01-16

## 2024-01-11 RX ORDER — FLUCONAZOLE 100 MG/1
200 TABLET ORAL DAILY
Status: DISCONTINUED | OUTPATIENT
Start: 2024-01-11 | End: 2024-01-11 | Stop reason: HOSPADM

## 2024-01-11 RX ADMIN — ATORVASTATIN CALCIUM 40 MG: 40 TABLET, FILM COATED ORAL at 08:22

## 2024-01-11 RX ADMIN — CEFAZOLIN 2000 MG: 1 INJECTION, POWDER, FOR SOLUTION INTRAMUSCULAR; INTRAVENOUS at 08:21

## 2024-01-11 RX ADMIN — INSULIN LISPRO 10 UNITS: 100 INJECTION, SOLUTION INTRAVENOUS; SUBCUTANEOUS at 11:25

## 2024-01-11 RX ADMIN — FLUCONAZOLE 200 MG: 100 TABLET ORAL at 09:47

## 2024-01-11 RX ADMIN — HYDROCHLOROTHIAZIDE 25 MG: 25 TABLET ORAL at 08:22

## 2024-01-11 RX ADMIN — INSULIN LISPRO 2 UNITS: 100 INJECTION, SOLUTION INTRAVENOUS; SUBCUTANEOUS at 08:21

## 2024-01-11 RX ADMIN — OXYCODONE HYDROCHLORIDE 5 MG: 5 TABLET ORAL at 04:33

## 2024-01-11 RX ADMIN — LABETALOL HYDROCHLORIDE 100 MG: 100 TABLET, FILM COATED ORAL at 08:22

## 2024-01-11 RX ADMIN — LOSARTAN POTASSIUM 100 MG: 50 TABLET, FILM COATED ORAL at 08:22

## 2024-01-11 RX ADMIN — CEFAZOLIN 2000 MG: 1 INJECTION, POWDER, FOR SOLUTION INTRAMUSCULAR; INTRAVENOUS at 00:25

## 2024-01-11 RX ADMIN — OXYCODONE HYDROCHLORIDE 5 MG: 5 TABLET ORAL at 14:12

## 2024-01-11 ASSESSMENT — PAIN DESCRIPTION - LOCATION
LOCATION: PENIS
LOCATION: GROIN;PENIS

## 2024-01-11 ASSESSMENT — PAIN SCALES - WONG BAKER
WONGBAKER_NUMERICALRESPONSE: 2
WONGBAKER_NUMERICALRESPONSE: 2

## 2024-01-11 ASSESSMENT — PAIN SCALES - GENERAL
PAINLEVEL_OUTOF10: 4
PAINLEVEL_OUTOF10: 7
PAINLEVEL_OUTOF10: 7
PAINLEVEL_OUTOF10: 3

## 2024-01-11 ASSESSMENT — PAIN DESCRIPTION - DESCRIPTORS
DESCRIPTORS: ACHING;DISCOMFORT
DESCRIPTORS: ACHING;DISCOMFORT

## 2024-01-11 NOTE — PLAN OF CARE
Problem: Safety - Adult  Goal: Free from fall injury  Outcome: Adequate for Discharge     Problem: ABCDS Injury Assessment  Goal: Absence of physical injury  Outcome: Adequate for Discharge     Problem: Pain  Goal: Verbalizes/displays adequate comfort level or baseline comfort level  Outcome: Adequate for Discharge     Problem: Discharge Planning  Goal: Discharge to home or other facility with appropriate resources  Outcome: Adequate for Discharge  Flowsheets (Taken 1/11/2024 0730)  Discharge to home or other facility with appropriate resources:   Identify barriers to discharge with patient and caregiver   Arrange for needed discharge resources and transportation as appropriate   Identify discharge learning needs (meds, wound care, etc)     Problem: Chronic Conditions and Co-morbidities  Goal: Patient's chronic conditions and co-morbidity symptoms are monitored and maintained or improved  Outcome: Adequate for Discharge  Flowsheets (Taken 1/11/2024 0730)  Care Plan - Patient's Chronic Conditions and Co-Morbidity Symptoms are Monitored and Maintained or Improved:   Monitor and assess patient's chronic conditions and comorbid symptoms for stability, deterioration, or improvement   Collaborate with multidisciplinary team to address chronic and comorbid conditions and prevent exacerbation or deterioration   Update acute care plan with appropriate goals if chronic or comorbid symptoms are exacerbated and prevent overall improvement and discharge

## 2024-01-11 NOTE — PROGRESS NOTES
1/11/2024        RE: José Barrera         [address] [place] [address] 6 [facility] [id number]          To Whom It May Concern,      Due to medical reasons, José Barrera should remain out of work until 1/18/2024. He was seen at [facility] from 1/10/2024-1/11/2024 for a procedure.           Sincerely,          Heriberto Atkins RN

## 2024-01-11 NOTE — PROGRESS NOTES
Discharge plan of care/case management plan validated with provider discharge order. Discharge instructions printed and provided to the patient with all questions answered in full. PIV x1 removed with cath tip intact. No telemetry. No taylor. Pt to be transported by cab. Pt waiting in room. Pt condition stable at this time.     1300--[employer] cancelled as they said projected  time would be 1600. Cab voucher obtained instead.  time estimated at 1345.    1430--Pt picked up by cab at this time. Pt wheeled to main entrance accompanied by staff. Pt condition stable at time of discharge.

## 2024-01-15 ENCOUNTER — TELEPHONE (OUTPATIENT)
Age: 59
End: 2024-01-15

## 2024-01-15 NOTE — TELEPHONE ENCOUNTER
Pt wanted to know if it was okay to shower due to his surgery he had on 1/10 for     INSERTION OF INFLATABLE PENILE PROSTHESIS

## 2024-01-16 ENCOUNTER — TELEPHONE (OUTPATIENT)
Age: 59
End: 2024-01-16

## 2024-01-16 RX ORDER — LIDOCAINE 50 MG/G
1 PATCH TOPICAL DAILY
Qty: 5 PATCH | Refills: 0 | Status: SHIPPED | OUTPATIENT
Start: 2024-01-16 | End: 2024-01-21

## 2024-01-16 NOTE — TELEPHONE ENCOUNTER
Wilbert zamorano call pt and explain to pt that all medications for pain will have a out of pocket cost - his options are over the counter or tylenol. I called pt and advised him of that info he said he understood and refused a apt for further discussion

## 2024-01-16 NOTE — TELEPHONE ENCOUNTER
Pt called stating he went to go  patches but was told by pharmacy he would need authorization in order to have them covered. Pt was wondering if you could rx a cream or something that might not need an auth?

## 2024-01-16 NOTE — TELEPHONE ENCOUNTER
He should keep his incision clean and dry. If he has pain around the area I can send him lidocaine patches.

## 2024-01-16 NOTE — TELEPHONE ENCOUNTER
Can you let pt know I'm working on prior auth- but it typically is never covered by insurance. So I'm unsure what the cost of them will end up being. There is versions of lidocaine patches that can be purchased over the counter if he wants to get those

## 2024-01-16 NOTE — TELEPHONE ENCOUNTER
Pt wanted to know if Dr. Mitchlel could prescribe him some healing cream for the top of his incision site . Wants it sent to [facility]

## 2024-01-16 NOTE — TELEPHONE ENCOUNTER
I let pt know he could get otc patches but he stated he did not want to pay for anything that wasn't covered by his insurance and was insistent on getting something else rx'd that his insurance would pay for. Would an rx strength cream also need prior auth if rx'd?

## 2024-01-16 NOTE — TELEPHONE ENCOUNTER
Spoke with pt and advised information.He stated he does have pain around the area can you send in lidocaine patches to [facility]

## 2024-01-16 NOTE — TELEPHONE ENCOUNTER
Can you please schedule patient with Dr. Mitchell on 1/19/2024  as a video visit at 945 am, reason pain at the incision  site after IPP    I spoke to patient, he reported no infection or edema at the incision site. Patient aware about video visit on Friday  Patient was offered lidocaine patches ( are not covered by insurance) and tylenol for pain. He wants a cream  for pain that would be covered by his insurance.   Thanks

## 2024-01-17 NOTE — TELEPHONE ENCOUNTER
I have previously spoken with the pt, pt wishes to speak to clinical staff instead for clarification about previous encounters. Chiquis and I have both previously explained to the pt the otc options he has.

## 2024-01-17 NOTE — TELEPHONE ENCOUNTER
PER SUKH SHE SPOKE WITH PT AND SHE NEEDS HIM SCHEDULED FOR FRIDAY  VIRTUAL VISIT WITH DR GONZALEZ CAN YOU PLEASE NAYNA THAT

## 2024-01-22 ENCOUNTER — HOSPITAL ENCOUNTER (INPATIENT)
Facility: HOSPITAL | Age: 59
LOS: 3 days | Discharge: HOME | DRG: 730 | End: 2024-01-25
Attending: STUDENT IN AN ORGANIZED HEALTH CARE EDUCATION/TRAINING PROGRAM | Admitting: STUDENT IN AN ORGANIZED HEALTH CARE EDUCATION/TRAINING PROGRAM
Payer: MEDICARE

## 2024-01-22 ENCOUNTER — TELEPHONE (OUTPATIENT)
Age: 59
End: 2024-01-22

## 2024-01-22 DIAGNOSIS — N50.89 TESTICULAR SWELLING: ICD-10-CM

## 2024-01-22 DIAGNOSIS — L08.9 SOFT TISSUE INFECTION: ICD-10-CM

## 2024-01-22 DIAGNOSIS — N48.89 PENILE SWELLING: Primary | ICD-10-CM

## 2024-01-22 LAB
ALBUMIN SERPL-MCNC: 4 G/DL (ref 3.5–5)
ALBUMIN/GLOB SERPL: 0.9 (ref 1.1–2.2)
ALP SERPL-CCNC: 121 U/L (ref 45–117)
ALT SERPL-CCNC: 95 U/L (ref 12–78)
ANION GAP SERPL CALC-SCNC: 4 MMOL/L (ref 5–15)
APPEARANCE UR: CLEAR
AST SERPL W P-5'-P-CCNC: 37 U/L (ref 15–37)
BACTERIA URNS QL MICRO: NEGATIVE /HPF
BASOPHILS # BLD: 0.1 K/UL (ref 0–0.1)
BASOPHILS NFR BLD: 1 % (ref 0–1)
BILIRUB SERPL-MCNC: 0.4 MG/DL (ref 0.2–1)
BILIRUB UR QL: NEGATIVE
BUN SERPL-MCNC: 24 MG/DL (ref 6–20)
BUN/CREAT SERPL: 17 (ref 12–20)
CA-I BLD-MCNC: 9.5 MG/DL (ref 8.5–10.1)
CHLORIDE SERPL-SCNC: 104 MMOL/L (ref 97–108)
CO2 SERPL-SCNC: 32 MMOL/L (ref 21–32)
COLOR UR: ABNORMAL
CREAT SERPL-MCNC: 1.4 MG/DL (ref 0.7–1.3)
DIFFERENTIAL METHOD BLD: ABNORMAL
EOSINOPHIL # BLD: 0.2 K/UL (ref 0–0.4)
EOSINOPHIL NFR BLD: 2 % (ref 0–7)
EPITH CASTS URNS QL MICRO: ABNORMAL /LPF
ERYTHROCYTE [DISTWIDTH] IN BLOOD BY AUTOMATED COUNT: 11.9 % (ref 11.5–14.5)
GLOBULIN SER CALC-MCNC: 4.6 G/DL (ref 2–4)
GLUCOSE SERPL-MCNC: 136 MG/DL (ref 65–100)
GLUCOSE UR STRIP.AUTO-MCNC: NEGATIVE MG/DL
HCT VFR BLD AUTO: 35.9 % (ref 36.6–50.3)
HGB BLD-MCNC: 12.2 G/DL (ref 12.1–17)
HGB UR QL STRIP: NEGATIVE
HYALINE CASTS URNS QL MICRO: ABNORMAL /LPF (ref 0–5)
IMM GRANULOCYTES # BLD AUTO: 0 K/UL (ref 0–0.04)
IMM GRANULOCYTES NFR BLD AUTO: 0 % (ref 0–0.5)
KETONES UR QL STRIP.AUTO: NEGATIVE MG/DL
LEUKOCYTE ESTERASE UR QL STRIP.AUTO: NEGATIVE
LYMPHOCYTES # BLD: 1.9 K/UL (ref 0.8–3.5)
LYMPHOCYTES NFR BLD: 20 % (ref 12–49)
MCH RBC QN AUTO: 29 PG (ref 26–34)
MCHC RBC AUTO-ENTMCNC: 34 G/DL (ref 30–36.5)
MCV RBC AUTO: 85.3 FL (ref 80–99)
MONOCYTES # BLD: 0.6 K/UL (ref 0–1)
MONOCYTES NFR BLD: 6 % (ref 5–13)
MUCOUS THREADS URNS QL MICRO: ABNORMAL /LPF
NEUTS SEG # BLD: 6.8 K/UL (ref 1.8–8)
NEUTS SEG NFR BLD: 71 % (ref 32–75)
NITRITE UR QL STRIP.AUTO: NEGATIVE
NRBC # BLD: 0 K/UL (ref 0–0.01)
NRBC BLD-RTO: 0 PER 100 WBC
PH UR STRIP: 5 (ref 5–8)
PLATELET # BLD AUTO: 321 K/UL (ref 150–400)
PMV BLD AUTO: 11.2 FL (ref 8.9–12.9)
POTASSIUM SERPL-SCNC: 3.8 MMOL/L (ref 3.5–5.1)
PROT SERPL-MCNC: 8.6 G/DL (ref 6.4–8.2)
PROT UR STRIP-MCNC: NEGATIVE MG/DL
RBC # BLD AUTO: 4.21 M/UL (ref 4.1–5.7)
RBC #/AREA URNS HPF: ABNORMAL /HPF (ref 0–5)
SODIUM SERPL-SCNC: 140 MMOL/L (ref 136–145)
SP GR UR REFRACTOMETRY: 1.02 (ref 1–1.03)
URINE CULTURE IF INDICATED: ABNORMAL
UROBILINOGEN UR QL STRIP.AUTO: 0.1 EU/DL (ref 0.1–1)
WBC # BLD AUTO: 9.5 K/UL (ref 4.1–11.1)
WBC URNS QL MICRO: ABNORMAL /HPF (ref 0–4)

## 2024-01-22 PROCEDURE — 36415 COLL VENOUS BLD VENIPUNCTURE: CPT

## 2024-01-22 PROCEDURE — 1100000000 HC RM PRIVATE

## 2024-01-22 PROCEDURE — 85025 COMPLETE CBC W/AUTO DIFF WBC: CPT

## 2024-01-22 PROCEDURE — 99285 EMERGENCY DEPT VISIT HI MDM: CPT

## 2024-01-22 PROCEDURE — 81001 URINALYSIS AUTO W/SCOPE: CPT

## 2024-01-22 PROCEDURE — 80053 COMPREHEN METABOLIC PANEL: CPT

## 2024-01-22 PROCEDURE — 2580000003 HC RX 258: Performed by: STUDENT IN AN ORGANIZED HEALTH CARE EDUCATION/TRAINING PROGRAM

## 2024-01-22 PROCEDURE — 6360000002 HC RX W HCPCS: Performed by: STUDENT IN AN ORGANIZED HEALTH CARE EDUCATION/TRAINING PROGRAM

## 2024-01-22 RX ORDER — MORPHINE SULFATE 4 MG/ML
4 INJECTION, SOLUTION INTRAMUSCULAR; INTRAVENOUS
Status: COMPLETED | OUTPATIENT
Start: 2024-01-22 | End: 2024-01-22

## 2024-01-22 RX ADMIN — MORPHINE SULFATE 4 MG: 4 INJECTION, SOLUTION INTRAMUSCULAR; INTRAVENOUS at 19:59

## 2024-01-22 RX ADMIN — VANCOMYCIN HYDROCHLORIDE 2000 MG: 1 INJECTION, POWDER, LYOPHILIZED, FOR SOLUTION INTRAVENOUS at 20:42

## 2024-01-22 RX ADMIN — PIPERACILLIN AND TAZOBACTAM 4500 MG: 4; .5 INJECTION, POWDER, LYOPHILIZED, FOR SOLUTION INTRAVENOUS at 20:01

## 2024-01-22 ASSESSMENT — PAIN SCALES - GENERAL
PAINLEVEL_OUTOF10: 10
PAINLEVEL_OUTOF10: 7
PAINLEVEL_OUTOF10: 10

## 2024-01-22 ASSESSMENT — PAIN - FUNCTIONAL ASSESSMENT
PAIN_FUNCTIONAL_ASSESSMENT: 0-10
PAIN_FUNCTIONAL_ASSESSMENT: PREVENTS OR INTERFERES SOME ACTIVE ACTIVITIES AND ADLS

## 2024-01-22 ASSESSMENT — LIFESTYLE VARIABLES
HOW MANY STANDARD DRINKS CONTAINING ALCOHOL DO YOU HAVE ON A TYPICAL DAY: PATIENT DOES NOT DRINK
HOW OFTEN DO YOU HAVE A DRINK CONTAINING ALCOHOL: NEVER

## 2024-01-22 NOTE — ED TRIAGE NOTES
Pt came in from home for prostate swelling and urinary retention. Patient did have surgery last Wednesday on his prostate

## 2024-01-23 LAB
GLUCOSE BLD STRIP.AUTO-MCNC: 152 MG/DL (ref 65–100)
GLUCOSE BLD STRIP.AUTO-MCNC: 193 MG/DL (ref 65–100)
GLUCOSE BLD STRIP.AUTO-MCNC: 208 MG/DL (ref 65–100)
GLUCOSE BLD STRIP.AUTO-MCNC: 280 MG/DL (ref 65–100)
PERFORMED BY:: ABNORMAL

## 2024-01-23 PROCEDURE — 2580000003 HC RX 258: Performed by: STUDENT IN AN ORGANIZED HEALTH CARE EDUCATION/TRAINING PROGRAM

## 2024-01-23 PROCEDURE — 6360000002 HC RX W HCPCS: Performed by: STUDENT IN AN ORGANIZED HEALTH CARE EDUCATION/TRAINING PROGRAM

## 2024-01-23 PROCEDURE — 6370000000 HC RX 637 (ALT 250 FOR IP): Performed by: STUDENT IN AN ORGANIZED HEALTH CARE EDUCATION/TRAINING PROGRAM

## 2024-01-23 PROCEDURE — 99222 1ST HOSP IP/OBS MODERATE 55: CPT | Performed by: STUDENT IN AN ORGANIZED HEALTH CARE EDUCATION/TRAINING PROGRAM

## 2024-01-23 PROCEDURE — 82962 GLUCOSE BLOOD TEST: CPT

## 2024-01-23 PROCEDURE — 1100000000 HC RM PRIVATE

## 2024-01-23 RX ORDER — INSULIN GLARGINE 100 [IU]/ML
60 INJECTION, SOLUTION SUBCUTANEOUS NIGHTLY
Status: DISCONTINUED | OUTPATIENT
Start: 2024-01-23 | End: 2024-01-25 | Stop reason: HOSPADM

## 2024-01-23 RX ORDER — DEXTROSE MONOHYDRATE 100 MG/ML
INJECTION, SOLUTION INTRAVENOUS CONTINUOUS PRN
Status: DISCONTINUED | OUTPATIENT
Start: 2024-01-23 | End: 2024-01-25 | Stop reason: HOSPADM

## 2024-01-23 RX ORDER — HYDROCHLOROTHIAZIDE 25 MG/1
25 TABLET ORAL DAILY
Status: DISCONTINUED | OUTPATIENT
Start: 2024-01-23 | End: 2024-01-25 | Stop reason: HOSPADM

## 2024-01-23 RX ORDER — GLUCAGON 1 MG/ML
1 KIT INJECTION PRN
Status: DISCONTINUED | OUTPATIENT
Start: 2024-01-23 | End: 2024-01-25 | Stop reason: HOSPADM

## 2024-01-23 RX ORDER — ACETAMINOPHEN 325 MG/1
650 TABLET ORAL EVERY 4 HOURS PRN
Status: DISCONTINUED | OUTPATIENT
Start: 2024-01-23 | End: 2024-01-25 | Stop reason: HOSPADM

## 2024-01-23 RX ORDER — INSULIN LISPRO 100 [IU]/ML
10 INJECTION, SOLUTION INTRAVENOUS; SUBCUTANEOUS
Status: DISCONTINUED | OUTPATIENT
Start: 2024-01-23 | End: 2024-01-25 | Stop reason: HOSPADM

## 2024-01-23 RX ORDER — INSULIN LISPRO 100 [IU]/ML
0-4 INJECTION, SOLUTION INTRAVENOUS; SUBCUTANEOUS NIGHTLY
Status: DISCONTINUED | OUTPATIENT
Start: 2024-01-23 | End: 2024-01-25 | Stop reason: HOSPADM

## 2024-01-23 RX ORDER — FUROSEMIDE 10 MG/ML
40 INJECTION INTRAMUSCULAR; INTRAVENOUS ONCE
Status: COMPLETED | OUTPATIENT
Start: 2024-01-23 | End: 2024-01-23

## 2024-01-23 RX ORDER — VITAMIN B COMPLEX
2 TABLET ORAL WEEKLY
Status: DISCONTINUED | OUTPATIENT
Start: 2024-01-23 | End: 2024-01-25 | Stop reason: HOSPADM

## 2024-01-23 RX ORDER — FLUCONAZOLE 100 MG/1
200 TABLET ORAL DAILY
Status: DISCONTINUED | OUTPATIENT
Start: 2024-01-23 | End: 2024-01-25 | Stop reason: HOSPADM

## 2024-01-23 RX ORDER — OXYCODONE HYDROCHLORIDE 5 MG/1
5 TABLET ORAL EVERY 4 HOURS PRN
Status: DISCONTINUED | OUTPATIENT
Start: 2024-01-23 | End: 2024-01-25 | Stop reason: HOSPADM

## 2024-01-23 RX ORDER — ATORVASTATIN CALCIUM 40 MG/1
40 TABLET, FILM COATED ORAL DAILY
Status: DISCONTINUED | OUTPATIENT
Start: 2024-01-23 | End: 2024-01-25 | Stop reason: HOSPADM

## 2024-01-23 RX ORDER — LOSARTAN POTASSIUM 50 MG/1
100 TABLET ORAL DAILY
Status: DISCONTINUED | OUTPATIENT
Start: 2024-01-23 | End: 2024-01-25 | Stop reason: HOSPADM

## 2024-01-23 RX ORDER — LABETALOL 200 MG/1
100 TABLET, FILM COATED ORAL 2 TIMES DAILY
Status: DISCONTINUED | OUTPATIENT
Start: 2024-01-23 | End: 2024-01-25 | Stop reason: HOSPADM

## 2024-01-23 RX ORDER — INSULIN LISPRO 100 [IU]/ML
0-4 INJECTION, SOLUTION INTRAVENOUS; SUBCUTANEOUS NIGHTLY
Status: DISCONTINUED | OUTPATIENT
Start: 2024-01-23 | End: 2024-01-23 | Stop reason: SDUPTHER

## 2024-01-23 RX ORDER — INSULIN LISPRO 100 [IU]/ML
0-16 INJECTION, SOLUTION INTRAVENOUS; SUBCUTANEOUS
Status: DISCONTINUED | OUTPATIENT
Start: 2024-01-23 | End: 2024-01-25 | Stop reason: HOSPADM

## 2024-01-23 RX ORDER — INSULIN LISPRO 100 [IU]/ML
0-16 INJECTION, SOLUTION INTRAVENOUS; SUBCUTANEOUS
Status: DISCONTINUED | OUTPATIENT
Start: 2024-01-23 | End: 2024-01-23 | Stop reason: SDUPTHER

## 2024-01-23 RX ADMIN — OXYCODONE 5 MG: 5 TABLET ORAL at 02:33

## 2024-01-23 RX ADMIN — LABETALOL HYDROCHLORIDE 100 MG: 200 TABLET, FILM COATED ORAL at 09:06

## 2024-01-23 RX ADMIN — HYDROCHLOROTHIAZIDE 25 MG: 25 TABLET ORAL at 09:07

## 2024-01-23 RX ADMIN — OXYCODONE 5 MG: 5 TABLET ORAL at 11:46

## 2024-01-23 RX ADMIN — LABETALOL HYDROCHLORIDE 100 MG: 200 TABLET, FILM COATED ORAL at 21:05

## 2024-01-23 RX ADMIN — INSULIN LISPRO 10 UNITS: 100 INJECTION, SOLUTION INTRAVENOUS; SUBCUTANEOUS at 11:47

## 2024-01-23 RX ADMIN — INSULIN LISPRO 10 UNITS: 100 INJECTION, SOLUTION INTRAVENOUS; SUBCUTANEOUS at 17:24

## 2024-01-23 RX ADMIN — LABETALOL HYDROCHLORIDE 100 MG: 200 TABLET, FILM COATED ORAL at 02:24

## 2024-01-23 RX ADMIN — ATORVASTATIN CALCIUM 40 MG: 40 TABLET, FILM COATED ORAL at 09:06

## 2024-01-23 RX ADMIN — OXYCODONE 5 MG: 5 TABLET ORAL at 16:05

## 2024-01-23 RX ADMIN — INSULIN GLARGINE 60 UNITS: 100 INJECTION, SOLUTION SUBCUTANEOUS at 21:05

## 2024-01-23 RX ADMIN — FUROSEMIDE 40 MG: 10 INJECTION, SOLUTION INTRAMUSCULAR; INTRAVENOUS at 11:45

## 2024-01-23 RX ADMIN — PIPERACILLIN AND TAZOBACTAM 3375 MG: 3; .375 INJECTION, POWDER, FOR SOLUTION INTRAVENOUS at 09:05

## 2024-01-23 RX ADMIN — FLUCONAZOLE 200 MG: 100 TABLET ORAL at 11:46

## 2024-01-23 RX ADMIN — PIPERACILLIN AND TAZOBACTAM 3375 MG: 3; .375 INJECTION, POWDER, FOR SOLUTION INTRAVENOUS at 17:22

## 2024-01-23 RX ADMIN — OXYCODONE 5 MG: 5 TABLET ORAL at 21:05

## 2024-01-23 RX ADMIN — VANCOMYCIN HYDROCHLORIDE 750 MG: 750 INJECTION, POWDER, LYOPHILIZED, FOR SOLUTION INTRAVENOUS at 12:56

## 2024-01-23 RX ADMIN — Medication 2000 UNITS: at 09:07

## 2024-01-23 RX ADMIN — INSULIN LISPRO 8 UNITS: 100 INJECTION, SOLUTION INTRAVENOUS; SUBCUTANEOUS at 11:46

## 2024-01-23 RX ADMIN — LOSARTAN POTASSIUM 100 MG: 50 TABLET, FILM COATED ORAL at 09:06

## 2024-01-23 RX ADMIN — PIPERACILLIN AND TAZOBACTAM 3375 MG: 3; .375 INJECTION, POWDER, FOR SOLUTION INTRAVENOUS at 00:44

## 2024-01-23 RX ADMIN — OXYCODONE 5 MG: 5 TABLET ORAL at 06:31

## 2024-01-23 ASSESSMENT — PAIN SCALES - GENERAL
PAINLEVEL_OUTOF10: 6
PAINLEVEL_OUTOF10: 8
PAINLEVEL_OUTOF10: 4
PAINLEVEL_OUTOF10: 7
PAINLEVEL_OUTOF10: 4
PAINLEVEL_OUTOF10: 0
PAINLEVEL_OUTOF10: 3
PAINLEVEL_OUTOF10: 6
PAINLEVEL_OUTOF10: 6
PAINLEVEL_OUTOF10: 0
PAINLEVEL_OUTOF10: 7
PAINLEVEL_OUTOF10: 3
PAINLEVEL_OUTOF10: 4
PAINLEVEL_OUTOF10: 3

## 2024-01-23 ASSESSMENT — PAIN DESCRIPTION - ORIENTATION
ORIENTATION: ANTERIOR

## 2024-01-23 ASSESSMENT — PAIN - FUNCTIONAL ASSESSMENT
PAIN_FUNCTIONAL_ASSESSMENT: ACTIVITIES ARE NOT PREVENTED

## 2024-01-23 ASSESSMENT — PAIN DESCRIPTION - LOCATION
LOCATION: PENIS

## 2024-01-23 ASSESSMENT — PAIN DESCRIPTION - DESCRIPTORS
DESCRIPTORS: THROBBING
DESCRIPTORS: ACHING;SORE
DESCRIPTORS: ACHING;SHARP
DESCRIPTORS: THROBBING
DESCRIPTORS: ACHING
DESCRIPTORS: THROBBING

## 2024-01-23 ASSESSMENT — PAIN DESCRIPTION - PAIN TYPE: TYPE: ACUTE PAIN

## 2024-01-23 ASSESSMENT — PAIN SCALES - WONG BAKER: WONGBAKER_NUMERICALRESPONSE: 0

## 2024-01-23 NOTE — PROGRESS NOTES
Vancomycin Dosing Consult  José Barrera is a 58 y.o. male with penile swelling. Pharmacy was consulted by Dr. Mitchell to dose and monitor vancomycin. Today is day 1.    Antibiotic regimen: Vancomycin + Zosyn + Diflucan    Temp (24hrs), Av °F (36.7 °C), Min:97.5 °F (36.4 °C), Max:98.6 °F (37 °C)    Recent Labs     24  1907   WBC 9.5   CREATININE 1.40*   BUN 24*     Est CrCl: 68 mL/min  Concomitant nephrotoxic drugs: Loop diuretics    Cultures:   None    MRSA Swab: N/A    Target range: AUC/CHITRA 400-600    Recent level history:  Date/Time Dose & Interval Measured Level (mcg/mL) Associated AUC/CHITRA              Assessment/Plan:   Afebrile, WBC WNL  Started Vancomycin 2000mg IV x1, 750mg IV q12h (estimated AUC = 406).  Pharmacy will order a random level tomorrow at 1100.   Antimicrobial stop date 5 days from 24

## 2024-01-23 NOTE — H&P
Urology H&P    Patient: José Barrera MRN: [id number]  SSN: [id number]    YOB: 1965  Age: 58 y.o.  Sex: male          Date of Consultation:  January 23, 2024  Requesting Physician: Julian Mitchell MD  Reason for Consultation: Penile Swelling and Urinary Retention           Assessment/Plan:  Mr. Barrera is a 58-year-old male with the below mentioned medical problems who underwent placement of an inflatable penile prosthesis on January 10, 2024 and return to the emergency department on January 22, 2024 with worsening penile swelling and urinary retention.    Penile swelling -appears to be within the normal range for swelling secondary to postoperative healing.  Although there is a small concern this could be an early infection.  We will treat him with p.o. fluconazole, IV vancomycin and Zosyn and observe.  I will also give him a one-time dose of Lasix today and encouraged him to walk to help with drainage.  The penile swelling appears to be interstitial edema and I suspect will improve with time.    Urinary retention -patient stated he was having some issues with voiding which he believes are secondary to the penile swelling.  I encouraged him to stand when voiding which he states was helping prior to admission.     History of Present Illness:  Patient is a 58 y.o. male admitted 1/22/2024 to the hospital for Testicular swelling [N50.89]  Penile swelling [N48.89]  Soft tissue infection [L08.9].  He is a 58-year-old male with multiple medical comorbidities which are listed below as well as erectile dysfunction likely secondary to longstanding diabetes who underwent an inflatable penile prosthesis on January 10, 2024.  He has had normal postoperative healing until yesterday when he went to [employer] and after he returned stated that his penis and scrotum were significantly more swollen.  He also had more bothersome pain and called the clinic for advice however this was after hours and no providers were

## 2024-01-23 NOTE — CARE COORDINATION
01/23/24 1123   Service Assessment   Patient Orientation Alert and Oriented   Cognition Alert   History Provided By Patient   Primary Caregiver Self   Accompanied By/Relationship Patient alone in room.   Support Systems Family Members   Patient's Healthcare Decision Maker is: Patient Declined (Legal Next of Kin Remains as Decision Maker)   PCP Verified by CM Yes   Last Visit to PCP   (Seen one week ago by Dr. Opal Del Valle.)   Prior Functional Level Independent in ADLs/IADLs   Current Functional Level Independent in ADLs/IADLs   Can patient return to prior living arrangement Yes   Ability to make needs known: Good   Family able to assist with home care needs: Yes   Would you like for me to discuss the discharge plan with any other family members/significant others, and if so, who? No   Financial Resources Medicare;Medicaid   Community Resources None   Social/Functional History   Lives With Family   Type of Home Apartment   Home Layout One level   Home Access Level entry   Discharge Planning   Type of Residence Apartment   Living Arrangements Family Members   Patient expects to be discharged to: Apartment   Services At/After Discharge   Mode of Transport at Discharge Other (see comment)  (Medicaid cab.)   Confirm Follow Up Transport Self       Patient confirmed demographics on chart. He lives with family members in an apartment with a level entry. He ambulates independently and is an active . Home health services were politely declined at this time. Current discharge disposition is home self care; patient will need a Medicaid cab to transport home.     Advance Care Planning     General Advance Care Planning (ACP) Conversation    Date of Conversation: 1/22/2024  Conducted with: Patient with Decision Making Capacity    Healthcare Decision Maker:  No healthcare decision makers have been documented.  Click here to complete HealthCare Decision Makers including selection of the Healthcare Decision Maker

## 2024-01-23 NOTE — ED NOTES
Assumed care of patient at this time. Connected to continuous cardiac and sp02 monitoring. No signs of distress noted.

## 2024-01-23 NOTE — ED PROVIDER NOTES
Culture not indicated by UA result Culture not indicated by UA result      Mucus, UA Trace (A) Negative /lpf    Hyaline Casts, UA 2-5 0 - 5 /lpf       Radiologic Studies:  Non-plain film images such as CT, Ultrasound and MRI are read by the radiologist. Plain radiographic images are visualized and preliminarily interpreted by the ED Provider with the below findings:      Interpretation per the Radiologist below, if available at the time of this note:  No orders to display        Diagnosis     Clinical Impression:   1. Penile swelling    2. Testicular swelling        Disposition & Disposition Considerations     DISPOSITION Admitted 01/22/2024 07:02:53 PM      Admit Note: Pt is being admitted by Dr. Mitchell. The results of their tests and reason(s) for their admission have been discussed with pt and/or available family. They convey agreement and understanding for the need to be admitted and for the admission diagnosis.    Additional Disposition Considerations:      Smoking Cessation:Not Applicable    DISCHARGE PLAN:  1.   Current Discharge Medication List        CONTINUE these medications which have NOT CHANGED    Details   aspirin 81 MG EC tablet Take 1 tablet by mouth daily      Cholecalciferol (VITAMIN D) 50 MCG (2000 UT) CAPS capsule Take 1 capsule by mouth once a week      HUMULIN N 100 UNIT/ML injection vial INJECT 10 UNITS SUBCUTANEOUSLY ONCE DAILY AND INJECT per sliding scale THREE TIMES DAILY BEFORE MEALS (max OF 12 UNITS PER DOSE AND 36 UNITS DAILY)      GNP ULTICARE PEN NEEDLES 32G X 4 MM MISC USE 1 needle SUBCUTANEOUSLY THREE TIMES DAILY TO administer insulin      Lancets (ONETOUCH DELICA PLUS [id number]) MISC use to check blood sugar FOUR TIMES DAILY  Qty: 200 each, Refills: 5      atorvastatin (LIPITOR) 40 MG tablet Take 1 tablet by mouth daily      clopidogrel (PLAVIX) 75 MG tablet Take 1 tablet by mouth daily      ONETOUCH VERIO strip USE TO CHECK BLOOD SUGAR THREE TIMES DAILY AS DIRECTED     for you. Read the directions carefully, and ask your doctor or other care provider to review them with you.                5. Discontinued Medications:   Current Discharge Medication List          Procedures     Unless otherwise noted below, none.    Performed by: Sachin Tim MD   Procedures      Critical Care Time     Critical care billing criteria and/or time were NOT met.    Documentation     I am the Primary Clinician of Record: Sachin Tim MD (electronically signed)    (Please note that parts of this dictation were completed with voice recognition software. Quite often unanticipated grammatical, syntax, homophones, and other interpretive errors are inadvertently transcribed by the computer software. Please disregards these errors. Please excuse any errors that have escaped final proofreading.)        Sachin Tim MD  01/22/24 6965

## 2024-01-23 NOTE — PROGRESS NOTES
4 Eyes Skin Assessment     NAME:  José Barrera  YOB: 1965  MEDICAL RECORD NUMBER:  [id number]    The patient is being assessed for  Admission    I agree that at least one RN has performed a thorough Head to Toe Skin Assessment on the patient. ALL assessment sites listed below have been assessed.      Areas assessed by both nurses:    Head, Face, Ears, Shoulders, Back, Chest, Arms, Elbows, Hands, Sacrum. Buttock, Coccyx, Ischium, Legs. Feet and Heels, Under Medical Devices , and Other          Does the Patient have a Wound? No noted wound(s)       Fidel Prevention initiated by RN: No  Wound Care Orders initiated by RN: No    Pressure Injury (Stage 3,4, Unstageable, DTI, NWPT, and Complex wounds) if present, place Wound referral order by RN under : No    New Ostomies, if present place, Ostomy referral order under : No     Nurse 1 eSignature: Electronically signed by Roberta Kolb RN on 1/23/24 at 3:10 AM EST    **SHARE this note so that the co-signing nurse can place an eSignature**    Swelling of penis and scrotum noted. Skin is intact.    Nurse 2 eSignature: Electronically signed by Ly Gan RN on 1/23/24 at 3:14 AM EST

## 2024-01-23 NOTE — ED NOTES
ED TO INPATIENT SBAR HANDOFF    Patient Name: José Barrera   Preferred Name: José  : 1965  58 y.o.   Family/Caregiver Present: no   Code Status Order: Prior  PO Status: NPO  Telemetry Order:   C-SSRS: Risk of Suicide: No Risk  Sitter no   Restraints:   Sepsis Risk Score Sepsis Risk Score: 1.24    Situation  Chief Complaint   Patient presents with    Prostate Swelling    Urinary Retention     Brief Description of Patient's Condition: Patient had prosthetic surgery to penis on 1/10. Yesterday, patient started to have urinary retention and penile/scrotal swelling. Retained 615 ml on bladder scan where this RN straight cathed 600ml from bladder. Followed by Stephen who performed surgery. Unknown when patient will have surgery to fix issue. Made NPO at midnight for precaution.  Mental Status: oriented, alert, and coherent  Arrived from:Home  Imaging:   No orders to display     Abnormal labs:   Abnormal Labs Reviewed   CBC WITH AUTO DIFFERENTIAL - Abnormal; Notable for the following components:       Result Value    Hematocrit 35.9 (*)     All other components within normal limits   COMPREHENSIVE METABOLIC PANEL - Abnormal; Notable for the following components:    Anion Gap 4 (*)     Glucose 136 (*)     BUN 24 (*)     Creatinine 1.40 (*)     Est, Glom Filt Rate 58 (*)     ALT 95 (*)     Alk Phosphatase 121 (*)     Total Protein 8.6 (*)     Globulin 4.6 (*)     Albumin/Globulin Ratio 0.9 (*)     All other components within normal limits   URINALYSIS WITH REFLEX TO CULTURE - Abnormal; Notable for the following components:    Mucus, UA Trace (*)     All other components within normal limits       Background  Allergies: No Known Allergies  History:   Past Medical History:   Diagnosis Date    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

## 2024-01-23 NOTE — PROGRESS NOTES
0725: Bedside and verbal shift report completed by Roberta Kolb RN to Kellen Arboleda RN.     0830: Assessment completed. Pt ambulated in hallway independently.     1020: Pt up to bathroom and return to bed independently.     1200: Pt repositioned in bed.     1400: Pt ambulated in hallway and returned to room.     1600: Reassessment completed. Pt repositioned in bed.     1800: Pt repositioned in bed.     1920: Bedside and verbal shift report completed by Kellen Arboleda RN to Shona Munguia RN.

## 2024-01-24 LAB
CREAT SERPL-MCNC: 1.36 MG/DL (ref 0.7–1.3)
GLUCOSE BLD STRIP.AUTO-MCNC: 174 MG/DL (ref 65–100)
GLUCOSE BLD STRIP.AUTO-MCNC: 181 MG/DL (ref 65–100)
GLUCOSE BLD STRIP.AUTO-MCNC: 197 MG/DL (ref 65–100)
GLUCOSE BLD STRIP.AUTO-MCNC: 208 MG/DL (ref 65–100)
PERFORMED BY:: ABNORMAL
VANCOMYCIN SERPL-MCNC: 9.3 UG/ML

## 2024-01-24 PROCEDURE — 6360000002 HC RX W HCPCS: Performed by: STUDENT IN AN ORGANIZED HEALTH CARE EDUCATION/TRAINING PROGRAM

## 2024-01-24 PROCEDURE — 6370000000 HC RX 637 (ALT 250 FOR IP): Performed by: STUDENT IN AN ORGANIZED HEALTH CARE EDUCATION/TRAINING PROGRAM

## 2024-01-24 PROCEDURE — 2580000003 HC RX 258: Performed by: STUDENT IN AN ORGANIZED HEALTH CARE EDUCATION/TRAINING PROGRAM

## 2024-01-24 PROCEDURE — 1100000000 HC RM PRIVATE

## 2024-01-24 PROCEDURE — 82565 ASSAY OF CREATININE: CPT

## 2024-01-24 PROCEDURE — 94761 N-INVAS EAR/PLS OXIMETRY MLT: CPT

## 2024-01-24 PROCEDURE — 99232 SBSQ HOSP IP/OBS MODERATE 35: CPT | Performed by: STUDENT IN AN ORGANIZED HEALTH CARE EDUCATION/TRAINING PROGRAM

## 2024-01-24 PROCEDURE — 36415 COLL VENOUS BLD VENIPUNCTURE: CPT

## 2024-01-24 PROCEDURE — 82962 GLUCOSE BLOOD TEST: CPT

## 2024-01-24 PROCEDURE — 80202 ASSAY OF VANCOMYCIN: CPT

## 2024-01-24 RX ADMIN — OXYCODONE 5 MG: 5 TABLET ORAL at 04:49

## 2024-01-24 RX ADMIN — VANCOMYCIN HYDROCHLORIDE 750 MG: 750 INJECTION, POWDER, LYOPHILIZED, FOR SOLUTION INTRAVENOUS at 03:10

## 2024-01-24 RX ADMIN — INSULIN LISPRO 10 UNITS: 100 INJECTION, SOLUTION INTRAVENOUS; SUBCUTANEOUS at 14:11

## 2024-01-24 RX ADMIN — ATORVASTATIN CALCIUM 40 MG: 40 TABLET, FILM COATED ORAL at 09:44

## 2024-01-24 RX ADMIN — PIPERACILLIN AND TAZOBACTAM 3375 MG: 3; .375 INJECTION, POWDER, FOR SOLUTION INTRAVENOUS at 17:21

## 2024-01-24 RX ADMIN — LABETALOL HYDROCHLORIDE 100 MG: 200 TABLET, FILM COATED ORAL at 21:27

## 2024-01-24 RX ADMIN — INSULIN GLARGINE 60 UNITS: 100 INJECTION, SOLUTION SUBCUTANEOUS at 21:20

## 2024-01-24 RX ADMIN — FLUCONAZOLE 200 MG: 100 TABLET ORAL at 09:44

## 2024-01-24 RX ADMIN — LOSARTAN POTASSIUM 100 MG: 50 TABLET, FILM COATED ORAL at 09:44

## 2024-01-24 RX ADMIN — OXYCODONE 5 MG: 5 TABLET ORAL at 18:20

## 2024-01-24 RX ADMIN — HYDROCHLOROTHIAZIDE 25 MG: 25 TABLET ORAL at 09:44

## 2024-01-24 RX ADMIN — INSULIN LISPRO 10 UNITS: 100 INJECTION, SOLUTION INTRAVENOUS; SUBCUTANEOUS at 09:48

## 2024-01-24 RX ADMIN — INSULIN LISPRO 10 UNITS: 100 INJECTION, SOLUTION INTRAVENOUS; SUBCUTANEOUS at 18:17

## 2024-01-24 RX ADMIN — OXYCODONE 5 MG: 5 TABLET ORAL at 09:45

## 2024-01-24 RX ADMIN — VANCOMYCIN HYDROCHLORIDE 1000 MG: 1 INJECTION, POWDER, LYOPHILIZED, FOR SOLUTION INTRAVENOUS at 15:36

## 2024-01-24 RX ADMIN — LABETALOL HYDROCHLORIDE 100 MG: 200 TABLET, FILM COATED ORAL at 09:44

## 2024-01-24 RX ADMIN — PIPERACILLIN AND TAZOBACTAM 3375 MG: 3; .375 INJECTION, POWDER, FOR SOLUTION INTRAVENOUS at 04:22

## 2024-01-24 RX ADMIN — INSULIN LISPRO 4 UNITS: 100 INJECTION, SOLUTION INTRAVENOUS; SUBCUTANEOUS at 18:17

## 2024-01-24 RX ADMIN — PIPERACILLIN AND TAZOBACTAM 3375 MG: 3; .375 INJECTION, POWDER, FOR SOLUTION INTRAVENOUS at 09:44

## 2024-01-24 ASSESSMENT — PAIN SCALES - WONG BAKER: WONGBAKER_NUMERICALRESPONSE: 0

## 2024-01-24 ASSESSMENT — PAIN DESCRIPTION - ORIENTATION: ORIENTATION: ANTERIOR

## 2024-01-24 ASSESSMENT — PAIN DESCRIPTION - LOCATION: LOCATION: PENIS

## 2024-01-24 ASSESSMENT — PAIN SCALES - GENERAL
PAINLEVEL_OUTOF10: 4
PAINLEVEL_OUTOF10: 0
PAINLEVEL_OUTOF10: 9

## 2024-01-24 ASSESSMENT — PAIN DESCRIPTION - DESCRIPTORS: DESCRIPTORS: THROBBING

## 2024-01-24 ASSESSMENT — PAIN - FUNCTIONAL ASSESSMENT: PAIN_FUNCTIONAL_ASSESSMENT: ACTIVITIES ARE NOT PREVENTED

## 2024-01-24 NOTE — PROGRESS NOTES
4 Eyes Skin Assessment     NAME:  José Barrera  YOB: 1965  MEDICAL RECORD NUMBER:  [id number]    The patient is being assessed for  Other weekly    I agree that at least one RN has performed a thorough Head to Toe Skin Assessment on the patient. ALL assessment sites listed below have been assessed.      Areas assessed by both nurses:    Shoulders, Back, Chest, Arms, Elbows, Hands, Sacrum. Buttock, Coccyx, Ischium, and Legs. Feet and Heels        Does the Patient have a Wound? No noted wound(s)       Fidel Prevention initiated by RN: No  Wound Care Orders initiated by RN: No    Pressure Injury (Stage 3,4, Unstageable, DTI, NWPT, and Complex wounds) if present, place Wound referral order by RN under : No    New Ostomies, if present place, Ostomy referral order under : No     Nurse 1 eSignature: Electronically signed by Shona Munguia RN on 1/24/24 at 3:43 AM EST    **SHARE this note so that the co-signing nurse can place an eSignature**    Nurse 2 eSignature: Electronically signed by Starr Heller RN on 1/24/24 at 3:48 AM EST

## 2024-01-24 NOTE — PROGRESS NOTES
UROLOGY Progress Note          [phone]      Daily Progress Note: 1/24/2024      Subjective:   The patient is seen for UROLOGIC follow up. Continues to have bothersome penile swelling. He is able to urinate without issues despite the compression on his urethra. Mild pain. Tolerating his diet.     Problem List:  Patient Active Problem List   Diagnosis    Fissure in skin    Suicidal ideation    Peripheral circulatory disorder associated with type 2 diabetes mellitus (HCC)    Prostate cancer screening    Diabetic mononeuropathy associated with type 2 diabetes mellitus (HCC)    Low testosterone    Peripheral circulatory disorder due to type 2 diabetes mellitus (HCC)    Plantar fasciitis of right foot    Elevated liver enzymes    Pain in both lower legs    Diabetic peripheral neuropathy (HCC)    Abnormal Q waves on electrocardiogram    Mononeuropathy    Cocaine abuse, uncomplicated (HCC)    MDD (major depressive disorder)    Arteriosclerotic gangrene (HCC)    Spasm of back muscles    Severe obesity (HCC)    Hyperlipidemia    Peripheral arterial occlusive disease (HCC)    Essential hypertension    Atherosclerosis of arteries of extremities (HCC)    Coronary arteriosclerosis in native artery    Onychomycosis    Severe obesity (BMI 35.0-35.9 with comorbidity) (HCC)    Acquired left foot drop    Ulcer of foot (HCC)    Type 2 diabetes mellitus with diabetic polyneuropathy (HCC)    Cataract    Chest pain    Urinary retention    Erectile dysfunction    Gross hematuria    Status post CVA    Acquired keratoderma    Acromioclavicular joint arthritis    Arthropathy of lumbar facet joint    Capsulitis    Cellulitis of toe of left foot    Cerebrovascular accident (CVA) (HCC)    Contracture of joint of hand    Dupuytren's disease of palm    Disorder of bursae of right shoulder region    Disorder of nail    Hand pain    Impingement syndrome of left shoulder    Late effects of cerebrovascular disease    Low back pain

## 2024-01-24 NOTE — PROGRESS NOTES
Vancomycin Dosing Consult  José Barrera is a 58 y.o. male with penile swelling. Pharmacy was consulted by Dr. Mitchell to dose and monitor vancomycin. Today is day 2.    Antibiotic regimen: Vancomycin + Zosyn + Diflucan    Temp (24hrs), Av.8 °F (36.6 °C), Min:97.7 °F (36.5 °C), Max:97.9 °F (36.6 °C)    Recent Labs     24  1907 24  1131   WBC 9.5  --    CREATININE 1.40* 1.36*   BUN 24*  --      Est CrCl: 70 mL/min  Concomitant nephrotoxic drugs: Loop diuretics    Cultures:   None    MRSA Swab: N/A    Target range: AUC/CHITRA 400-600    Recent level history:  Date/Time Dose & Interval Measured Level (mcg/mL) Associated AUC/CHITRA    1131 750mg IV q12h 9.3 363        Assessment/Plan:   Afebrile, WBC WNL  Vancomycin level is below target.    Increase Vancomycin 1000mg IV q12h (estimated AUC = 471).  Pharmacy will order a random level on  at 1300.   Antimicrobial stop date 5 days from 24

## 2024-01-25 VITALS
SYSTOLIC BLOOD PRESSURE: 113 MMHG | RESPIRATION RATE: 18 BRPM | OXYGEN SATURATION: 99 % | HEIGHT: 69 IN | WEIGHT: 239 LBS | BODY MASS INDEX: 35.4 KG/M2 | HEART RATE: 71 BPM | TEMPERATURE: 97.9 F | DIASTOLIC BLOOD PRESSURE: 70 MMHG

## 2024-01-25 PROBLEM — L08.9 SOFT TISSUE INFECTION: Status: RESOLVED | Noted: 2024-01-22 | Resolved: 2024-01-25

## 2024-01-25 LAB
GLUCOSE BLD STRIP.AUTO-MCNC: 170 MG/DL (ref 65–100)
GLUCOSE BLD STRIP.AUTO-MCNC: 174 MG/DL (ref 65–100)
PERFORMED BY:: ABNORMAL
PERFORMED BY:: ABNORMAL

## 2024-01-25 PROCEDURE — 99231 SBSQ HOSP IP/OBS SF/LOW 25: CPT | Performed by: STUDENT IN AN ORGANIZED HEALTH CARE EDUCATION/TRAINING PROGRAM

## 2024-01-25 PROCEDURE — 2580000003 HC RX 258: Performed by: STUDENT IN AN ORGANIZED HEALTH CARE EDUCATION/TRAINING PROGRAM

## 2024-01-25 PROCEDURE — 82962 GLUCOSE BLOOD TEST: CPT

## 2024-01-25 PROCEDURE — 6360000002 HC RX W HCPCS: Performed by: STUDENT IN AN ORGANIZED HEALTH CARE EDUCATION/TRAINING PROGRAM

## 2024-01-25 PROCEDURE — 6370000000 HC RX 637 (ALT 250 FOR IP): Performed by: STUDENT IN AN ORGANIZED HEALTH CARE EDUCATION/TRAINING PROGRAM

## 2024-01-25 RX ORDER — OXYCODONE HYDROCHLORIDE 5 MG/1
5 TABLET ORAL EVERY 4 HOURS PRN
Qty: 12 TABLET | Refills: 0 | Status: SHIPPED | OUTPATIENT
Start: 2024-01-25 | End: 2024-01-28

## 2024-01-25 RX ORDER — FLUCONAZOLE 200 MG/1
200 TABLET ORAL DAILY
Qty: 5 TABLET | Refills: 0 | Status: SHIPPED | OUTPATIENT
Start: 2024-01-26 | End: 2024-01-31

## 2024-01-25 RX ORDER — LOPERAMIDE HYDROCHLORIDE 2 MG/1
2 CAPSULE ORAL 4 TIMES DAILY PRN
Qty: 28 CAPSULE | Refills: 0 | Status: SHIPPED | OUTPATIENT
Start: 2024-01-25 | End: 2024-02-01

## 2024-01-25 RX ORDER — DOXYCYCLINE HYCLATE 100 MG
100 TABLET ORAL 2 TIMES DAILY
Qty: 10 TABLET | Refills: 0 | Status: SHIPPED | OUTPATIENT
Start: 2024-01-25 | End: 2024-01-30

## 2024-01-25 RX ORDER — LOPERAMIDE HYDROCHLORIDE 2 MG/1
2 CAPSULE ORAL ONCE
Status: COMPLETED | OUTPATIENT
Start: 2024-01-25 | End: 2024-01-25

## 2024-01-25 RX ADMIN — INSULIN LISPRO 10 UNITS: 100 INJECTION, SOLUTION INTRAVENOUS; SUBCUTANEOUS at 08:33

## 2024-01-25 RX ADMIN — OXYCODONE 5 MG: 5 TABLET ORAL at 06:14

## 2024-01-25 RX ADMIN — PSYLLIUM HUSK 1 PACKET: 3.4 POWDER ORAL at 13:07

## 2024-01-25 RX ADMIN — LOPERAMIDE HYDROCHLORIDE 2 MG: 2 CAPSULE ORAL at 12:43

## 2024-01-25 RX ADMIN — ATORVASTATIN CALCIUM 40 MG: 40 TABLET, FILM COATED ORAL at 08:33

## 2024-01-25 RX ADMIN — LABETALOL HYDROCHLORIDE 100 MG: 200 TABLET, FILM COATED ORAL at 08:33

## 2024-01-25 RX ADMIN — PIPERACILLIN AND TAZOBACTAM 3375 MG: 3; .375 INJECTION, POWDER, FOR SOLUTION INTRAVENOUS at 02:18

## 2024-01-25 RX ADMIN — VANCOMYCIN HYDROCHLORIDE 1000 MG: 1 INJECTION, POWDER, LYOPHILIZED, FOR SOLUTION INTRAVENOUS at 02:18

## 2024-01-25 RX ADMIN — PIPERACILLIN AND TAZOBACTAM 3375 MG: 3; .375 INJECTION, POWDER, FOR SOLUTION INTRAVENOUS at 08:34

## 2024-01-25 RX ADMIN — FLUCONAZOLE 200 MG: 100 TABLET ORAL at 08:33

## 2024-01-25 RX ADMIN — INSULIN LISPRO 10 UNITS: 100 INJECTION, SOLUTION INTRAVENOUS; SUBCUTANEOUS at 12:06

## 2024-01-25 RX ADMIN — LOSARTAN POTASSIUM 100 MG: 50 TABLET, FILM COATED ORAL at 08:33

## 2024-01-25 RX ADMIN — HYDROCHLOROTHIAZIDE 25 MG: 25 TABLET ORAL at 08:33

## 2024-01-25 ASSESSMENT — PAIN - FUNCTIONAL ASSESSMENT: PAIN_FUNCTIONAL_ASSESSMENT: ACTIVITIES ARE NOT PREVENTED

## 2024-01-25 ASSESSMENT — PAIN DESCRIPTION - LOCATION: LOCATION: SCROTUM

## 2024-01-25 ASSESSMENT — PAIN SCALES - GENERAL
PAINLEVEL_OUTOF10: 8
PAINLEVEL_OUTOF10: 6

## 2024-01-25 ASSESSMENT — PAIN DESCRIPTION - DESCRIPTORS: DESCRIPTORS: DISCOMFORT

## 2024-01-25 NOTE — DISCHARGE SUMMARY
Discharge Summary    Date: 1/25/2024  Patient Name: José Barrera    YOB: 1965     Age: 58 y.o.    Admit Date: 1/22/2024  Discharge Date:  Discharge Condition: Good    Admission Diagnosis  Testicular swelling [N50.89];Penile swelling [N48.89];Soft tissue infection [L08.9]      Discharge Diagnosis  Principal Problem (Resolved):    Soft tissue infection  Active Problems:    * No active hospital problems. *      Hospital Stay  Narrative of Hospital Course:  58-year-old male admitted for presumed subclinical infection of the recently placed penile prosthesis with ongoing penile swelling.  He was administered broad-spectrum IV Zosyn and IV vancomycin as well as p.o. fluconazole for coverage.  He remained afebrile throughout his hospital stay and did not have any concerning signs for worsening infection.  He did have ongoing penile swelling and minimal scrotal swelling throughout the hospitalization which did not worsen.  He is being transition to oral antibiotics today and will continue to apply compressive dressing to the penis to help with postoperative swelling.  Plan for follow-up in 2 to 3 weeks for evaluation.    Consultants:  IP CONSULT TO PHARMACY    Surgeries/procedures Performed:  None     Treatments:    Analgesia and Antibiotics    Acetaminophen and Other, Vancomycin and Zosyn    Discharge Plan/Disposition:  Home    Hospital/Incidental Findings Requiring Follow Up:    Patient Instructions:    Diet: Diabetic Diet    Activity:No Lifting, Driving or Strenuous Excercise  For number of days (if applicable): 21      Other Instructions:    Provider Follow-Up:   No follow-ups on file.     Significant Diagnostic Studies:    Recent Labs:  Admission on 01/22/2024  WBC                                           Date: 01/22/2024  Value: 9.5         Ref range: 4.1 - 11.1 K/uL    Status: Final  RBC                                           Date: 01/22/2024  Value: 4.21        Ref range: 4.10 - 5.70 M/uL           Date: 01/22/2024  Value: 1.40 (H)    Ref range: 0.70 - 1.30 mg/dL  Status: Final  Bun/Cre Ratio                                 Date: 01/22/2024  Value: 17          Ref range: 12 - 20            Status: Final  Est, Glom Filt Rate                           Date: 01/22/2024  Value: 58 (L)      Ref range: >60 ml/min/1.73m2  Status: Final                Comment:    Pediatric calculator link: https://www.kidney.org/professionals/kdoqi/gfr_calculatorped    These results are not intended for use in patients <18 years of age.     eGFR results are calculated without a race factor using  the 2021 CKD-EPI equation. Careful clinical correlation is recommended, particularly when comparing to results calculated using previous equations.  The CKD-EPI equation is less accurate in patients with extremes of muscle mass, extra-renal metabolism of creatinine, excessive creatine ingestion, or following therapy that affects renal tubular secretion.    Calcium                                       Date: 01/22/2024  Value: 9.5         Ref range: 8.5 - 10.1 mg/dL   Status: Final  Total Bilirubin                               Date: 01/22/2024  Value: 0.4         Ref range: 0.2 - 1.0 mg/dL    Status: Final  AST                                           Date: 01/22/2024  Value: 37          Ref range: 15 - 37 U/L        Status: Final  ALT                                           Date: 01/22/2024  Value: 95 (H)      Ref range: 12 - 78 U/L        Status: Final  Alk Phosphatase                               Date: 01/22/2024  Value: 121 (H)     Ref range: 45 - 117 U/L       Status: Final  Total Protein                                 Date: 01/22/2024  Value: 8.6 (H)     Ref range: 6.4 - 8.2 g/dL     Status: Final  Albumin                                       Date: 01/22/2024  Value: 4.0         Ref range: 3.5 - 5.0 g/dL     Status: Final  Globulin                                      Date: 01/22/2024  Value: 4.6 (H)     Ref range: 2.0 -

## 2024-01-25 NOTE — PLAN OF CARE
Problem: Discharge Planning  Goal: Discharge to home or other facility with appropriate resources  Outcome: Progressing     Problem: Pain  Goal: Verbalizes/displays adequate comfort level or baseline comfort level  Outcome: Progressing     Problem: Safety - Adult  Goal: Free from fall injury  Outcome: Progressing     Problem: ABCDS Injury Assessment  Goal: Absence of physical injury  Outcome: Progressing     Problem: Chronic Conditions and Co-morbidities  Goal: Patient's chronic conditions and co-morbidity symptoms are monitored and maintained or improved  Outcome: Progressing     
  Problem: Discharge Planning  Goal: Discharge to home or other facility with appropriate resources  Outcome: Progressing  Flowsheets  Taken 1/23/2024 0248  Discharge to home or other facility with appropriate resources: Identify barriers to discharge with patient and caregiver  Taken 1/23/2024 0157  Discharge to home or other facility with appropriate resources: Identify barriers to discharge with patient and caregiver     Problem: Pain  Goal: Verbalizes/displays adequate comfort level or baseline comfort level  Outcome: Progressing     Problem: Safety - Adult  Goal: Free from fall injury  Outcome: Progressing     Problem: ABCDS Injury Assessment  Goal: Absence of physical injury  Outcome: Progressing     
  Problem: Discharge Planning  Goal: Discharge to home or other facility with appropriate resources  Outcome: Progressing  Flowsheets (Taken 1/23/2024 0830)  Discharge to home or other facility with appropriate resources:   Identify barriers to discharge with patient and caregiver   Arrange for needed discharge resources and transportation as appropriate   Identify discharge learning needs (meds, wound care, etc)   Refer to discharge planning if patient needs post-hospital services based on physician order or complex needs related to functional status, cognitive ability or social support system     Problem: Pain  Goal: Verbalizes/displays adequate comfort level or baseline comfort level  Outcome: Progressing  Flowsheets (Taken 1/23/2024 0742)  Verbalizes/displays adequate comfort level or baseline comfort level:   Encourage patient to monitor pain and request assistance   Assess pain using appropriate pain scale   Administer analgesics based on type and severity of pain and evaluate response   Implement non-pharmacological measures as appropriate and evaluate response   Consider cultural and social influences on pain and pain management   Notify Licensed Independent Practitioner if interventions unsuccessful or patient reports new pain     Problem: Safety - Adult  Goal: Free from fall injury  Outcome: Progressing     Problem: ABCDS Injury Assessment  Goal: Absence of physical injury  Outcome: Progressing     Problem: Chronic Conditions and Co-morbidities  Goal: Patient's chronic conditions and co-morbidity symptoms are monitored and maintained or improved  Outcome: Progressing  Flowsheets (Taken 1/23/2024 0830)  Care Plan - Patient's Chronic Conditions and Co-Morbidity Symptoms are Monitored and Maintained or Improved:   Monitor and assess patient's chronic conditions and comorbid symptoms for stability, deterioration, or improvement   Collaborate with multidisciplinary team to address chronic and comorbid 
monitored and maintained or improved  1/23/2024 2009 by Shona Munguia, RN  Outcome: Progressing  1/23/2024 1744 by Shanda Arboleda, RN  Outcome: Progressing  Flowsheets (Taken 1/23/2024 0830)  Care Plan - Patient's Chronic Conditions and Co-Morbidity Symptoms are Monitored and Maintained or Improved:   Monitor and assess patient's chronic conditions and comorbid symptoms for stability, deterioration, or improvement   Collaborate with multidisciplinary team to address chronic and comorbid conditions and prevent exacerbation or deterioration   Update acute care plan with appropriate goals if chronic or comorbid symptoms are exacerbated and prevent overall improvement and discharge

## 2024-01-25 NOTE — PROGRESS NOTES
Patient's IV and tele was removed. Patient's discharge information was given. Opportunity for questions was given. No questions at this time. Family to transport patient home.

## 2024-01-25 NOTE — DISCHARGE INSTRUCTIONS
You were admitted for penile swelling and a concern for subclinical infection of an implanted penile prosthesis.  You were administered broad-spectrum antibiotics and antifungal medications during this hospitalization which will be continued upon discharge.  These medications along with a short course of pain medications were sent to your pharmacy.  Please take these as prescribed.  You are to return to taking your normal home medications upon discharge.  The nursing staff will provide you with a Coban/elastic dressing which can be applied for compression to the penis multiple times per day to help with swelling.  You are to follow-up with your urologist in 2 to 3 weeks for postoperative evaluation.

## 2024-01-25 NOTE — CARE COORDINATION
Transition of Care Plan:    RUR: 11%  Prior Level of Functioning: Independent  Disposition: Home/self.  If SNF or IPR: Date FOC offered:   Date FOC received:   Accepting facility:   Date authorization started with reference number:   Date authorization received and expires:   Follow up appointments:   DME needed:   Transportation at discharge: Medicaid Cab  IM/IMM Medicare/ letter given: Yes  Is patient a  and connected with VA?    If yes, was [facility] transfer form completed and [facility] notified?   Caregiver Contact: Laisha Barrera [phone]  Discharge Caregiver contacted prior to discharge? No, Patient is alert and oriented.   Care Conference needed?   Barriers to discharge:     CM met with patient at bedside to inquire about discharge disposition.  Patient's RUR score is 11% and he passed his egress test. Patient denied the need for home health at time of discharge.  Current disposition is home.  He will need a medicaid cab. [facility] at [phone], transport is set for 2pm. Trip ID#: [id number].

## 2024-01-26 NOTE — PROGRESS NOTES
Physician Progress Note      PATIENT:               AARON MAC  CSN #:                  [id number]  :                       1965  ADMIT DATE:       2024 6:22 PM  DISCH DATE:        2024 3:34 PM  RESPONDING  PROVIDER #:        Julian Mitchell MD          QUERY TEXT:    Pt admitted with penile swelling with soft tissue infection and underwent   penile prosthesis insertion on .? If possible, please document in   progress notes and discharge summary the relationship if any between the soft   tissue infection and the surgery:    The medical record reflects the following:  Risk Factors: 57 yo male with DM, peripheral neuropathy, atherosclerosis  Clinical Indicators: Documentation states ' There is slight opening of the   incision at the superior aspect secondary to swelling and there is fibrinous   tissue exposed, The penile shaft skin is significantly swollen likely   secondary to post-operative healing/disruption of the lymphatic drainage. '  Treatment: IV Vancomycin, Zosyn, Fluconazole    Thank you,  Elena Hall RN, CCDS  Options provided:  -- Soft tissue infection is due to the procedure  -- Soft tissue infection is not due to the procedure, but is due to other   incidental risk factor, Please specify other incidental risk factor.  -- Other - I will add my own diagnosis  -- Disagree - Not applicable / Not valid  -- Disagree - Clinically unable to determine / Unknown  -- Refer to Clinical Documentation Reviewer    PROVIDER RESPONSE TEXT:    Patient has soft tissue infection due to the procedure.    Query created by: Elena Hall on 2024 2:09 PM      Electronically signed by:  Julian Mitchell MD 2024 8:12 AM

## 2024-01-29 PROBLEM — E29.1 MALE HYPOGONADISM: Status: RESOLVED | Noted: 2024-01-03 | Resolved: 2024-01-29

## 2024-01-29 PROBLEM — E29.1 ANDROGEN DEFICIENCY: Status: RESOLVED | Noted: 2024-01-10 | Resolved: 2024-01-29

## 2024-01-29 PROBLEM — E29.1 HYPOGONADISM MALE: Status: RESOLVED | Noted: 2023-07-06 | Resolved: 2024-01-29

## 2024-01-29 PROBLEM — R79.89 LOW TESTOSTERONE: Status: RESOLVED | Noted: 2021-08-20 | Resolved: 2024-01-29

## 2024-01-29 PROBLEM — N48.89 PENILE EDEMA: Status: ACTIVE | Noted: 2024-01-29

## 2024-01-29 PROBLEM — R33.9 URINARY RETENTION: Status: RESOLVED | Noted: 2022-11-15 | Resolved: 2024-01-29

## 2024-01-31 ENCOUNTER — OFFICE VISIT (OUTPATIENT)
Age: 59
End: 2024-01-31
Payer: MEDICARE

## 2024-01-31 VITALS — HEART RATE: 76 BPM | SYSTOLIC BLOOD PRESSURE: 135 MMHG | DIASTOLIC BLOOD PRESSURE: 85 MMHG

## 2024-01-31 DIAGNOSIS — N52.9 ERECTILE DYSFUNCTION, UNSPECIFIED ERECTILE DYSFUNCTION TYPE: ICD-10-CM

## 2024-01-31 DIAGNOSIS — R31.0 GROSS HEMATURIA: Primary | ICD-10-CM

## 2024-01-31 DIAGNOSIS — R33.9 URINARY RETENTION: ICD-10-CM

## 2024-01-31 DIAGNOSIS — E29.1 HYPOGONADISM MALE: ICD-10-CM

## 2024-01-31 LAB
BILIRUBIN, URINE, POC: NEGATIVE
BLOOD URINE, POC: NEGATIVE
GLUCOSE URINE, POC: NEGATIVE
KETONES, URINE, POC: NEGATIVE
LEUKOCYTE ESTERASE, URINE, POC: NEGATIVE
NITRITE, URINE, POC: NEGATIVE
PH, URINE, POC: 6 (ref 4.6–8)
PROTEIN,URINE, POC: NEGATIVE
SPECIFIC GRAVITY, URINE, POC: 1.01 (ref 1–1.03)
URINALYSIS CLARITY, POC: CLEAR
URINALYSIS COLOR, POC: YELLOW
UROBILINOGEN, POC: NORMAL

## 2024-01-31 PROCEDURE — 3079F DIAST BP 80-89 MM HG: CPT | Performed by: UROLOGY

## 2024-01-31 PROCEDURE — 99213 OFFICE O/P EST LOW 20 MIN: CPT | Performed by: UROLOGY

## 2024-01-31 PROCEDURE — 81003 URINALYSIS AUTO W/O SCOPE: CPT | Performed by: UROLOGY

## 2024-01-31 PROCEDURE — 3075F SYST BP GE 130 - 139MM HG: CPT | Performed by: UROLOGY

## 2024-01-31 NOTE — PROGRESS NOTES
HISTORY OF PRESENT ILLNESS  José Barrera is a 58 y.o. male   Patient is voiding well no gross hematuria.  Initially after his penile prosthesis he had acute swelling after doing some lifting some type.  His swelling is gone down he was antibiotics doing well he will follow-up with Dr. Mitchell for his prosthesis.  From his hematuria point of view he is stable.  I told him he probably go back to work in March no heavy lifting.  He was having problems lifting with his back as well as problems with edema in his penis.  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 Gross hematuria  Overview:  11/18/22-gross hematuria his taylor catheter was removed.  CT scan unremarkable     Orders:  -     AMB POC URINALYSIS DIP STICK AUTO W/O MICRO  2. Hypogonadism male  Overview:  He has a history of   Erectile dysfunction and had gradual deterioration since 2014 at which time patient had a CVA.His last testosterone level was checked in 2021.   Labs from 2021  3/2021 -serum T was 206,   4/6/21 serum T was 323, FSH and LH normal     He had been using trimix with good results,Last appointment his trimix was increased to quad mix.  Referred by Dr. Mccoy  for penile prosthesis to DR. Mitchell       3. Urinary retention  Overview:  H/o urinary retention in 2022. Has had problems with urination since 2022 after open hear surgery    4. Erectile dysfunction, unspecified erectile dysfunction type  Overview:  1/10/2024: he is s/p insertion of inflatable penile prosthesis ([facility]).             PAST MEDICAL HISTORY  PMHx (including negatives):  has a past medical history of Arteriosclerotic gangrene ([facility]), Arthritis, Atherosclerosis of arteries of extremities ([facility]), Cataract, Coronary arteriosclerosis in native artery, Diabetes mellitus type 2, controlled ([facility]), Diabetic mononeuropathy associated with type 2 diabetes mellitus ([facility]), Diabetic peripheral neuropathy

## 2024-01-31 NOTE — PROGRESS NOTES
Chief Complaint   Patient presents with    Follow-up    Hypogonadism        /85   Pulse 76      PHQ-9 score is    Negative      1. \"Have you been to the ER, urgent care clinic since your last visit?  Hospitalized since your last visit?\" No    2. \"Have you seen or consulted any other health care providers outside of the [facility] since your last visit?\" nO     3. For patients aged 45-75: Has the patient had a colonoscopy / FIT/ Cologuard? Yes - no Care Gap present      If the patient is female:    4. For patients aged 40-74: Has the patient had a mammogram within the past 2 years? NA - based on age or sex      5. For patients aged 21-65: Has the patient had a pap smear? NA - based on age or sex

## 2024-02-02 ENCOUNTER — OFFICE VISIT (OUTPATIENT)
Age: 59
End: 2024-02-02

## 2024-02-02 DIAGNOSIS — N52.9 ERECTILE DYSFUNCTION, UNSPECIFIED ERECTILE DYSFUNCTION TYPE: ICD-10-CM

## 2024-02-02 DIAGNOSIS — Z91.89: ICD-10-CM

## 2024-02-02 DIAGNOSIS — N48.89 PENILE EDEMA: Primary | ICD-10-CM

## 2024-02-02 NOTE — PROGRESS NOTES
HISTORY OF PRESENT ILLNESS    José Barrera is a 58 y.o. male with history of  PVD,CAD on (Plavix and aspirin),DM,HTN is  s/p insertion of inflatable penile prosthesis on 1/10/2024 complicated by hospital admission for suspected infection (no leukocytosis), and post operative penile swelling. He was treated with IV abx and discharged home on oral  antibiotics and fluconazole on 1/25/2024    He is here in the office with  cc of bleeding  from the  incision site. He reports that bleeding/oozing from incision site  started about 3 days ago. He denies any fever,chills,difficulty voiding or signs of infection. Her  reports that  penile swelling went down considerably since admission to the hospital. He has mild pain  and takes tylenol as needed.       Past Medical History:  PMHx (including negatives):  has a past medical history of Arteriosclerotic gangrene (HCC), Arthritis, Atherosclerosis of arteries of extremities (HCC), Cataract, Coronary arteriosclerosis in native artery, Diabetes mellitus type 2, controlled (HCC), Diabetic mononeuropathy associated with type 2 diabetes mellitus (HCC), Diabetic peripheral neuropathy (HCC), EKG, abnormal, Elevated liver enzymes, Erectile dysfunction, Essential hypertension, Hepatitis, High cholesterol, Hyperlipidemia, Hypertension, Pain in both lower legs, Peripheral arterial occlusive disease (HCC), Peripheral circulatory disorder associated with type 2 diabetes mellitus (HCC), Spasm of back muscles, Stroke (HCC), and Ulcer of foot (HCC).   PSurgHx:  has a past surgical history that includes [place] tooth extraction; Cataract removal (Bilateral); orthopedic surgery (Left); orthopedic surgery (Right); IR TRANSCATH PLACE INTRAVASC STENT OPEN/PERC W OR WO ANGIO INIT VEIN; Ilio-femoral Bypass Graft (11/11/2022); Cardiac catheterization; and Penile prosthesis placement (N/A, 1/10/2024).  PSocHx:  reports that he has never smoked. He has never used smokeless tobacco. He reports that he does

## 2024-02-04 ENCOUNTER — HOSPITAL ENCOUNTER (EMERGENCY)
Facility: HOSPITAL | Age: 59
Discharge: HOME | End: 2024-02-04
Attending: STUDENT IN AN ORGANIZED HEALTH CARE EDUCATION/TRAINING PROGRAM
Payer: MEDICARE

## 2024-02-04 VITALS
HEART RATE: 116 BPM | TEMPERATURE: 98.6 F | OXYGEN SATURATION: 97 % | RESPIRATION RATE: 18 BRPM | SYSTOLIC BLOOD PRESSURE: 155 MMHG | DIASTOLIC BLOOD PRESSURE: 94 MMHG

## 2024-02-04 DIAGNOSIS — Z96.89 HISTORY OF IMPLANTATION OF PENILE PROSTHESIS: ICD-10-CM

## 2024-02-04 DIAGNOSIS — N48.89 PENILE PAIN: Primary | ICD-10-CM

## 2024-02-04 LAB
ALBUMIN SERPL-MCNC: 4.2 G/DL (ref 3.5–5)
ALBUMIN/GLOB SERPL: 1 (ref 1.1–2.2)
ALP SERPL-CCNC: 106 U/L (ref 45–117)
ALT SERPL-CCNC: 39 U/L (ref 12–78)
ANION GAP SERPL CALC-SCNC: 8 MMOL/L (ref 5–15)
APPEARANCE UR: CLEAR
AST SERPL W P-5'-P-CCNC: 39 U/L (ref 15–37)
BACTERIA URNS QL MICRO: NEGATIVE /HPF
BASOPHILS # BLD: 0 K/UL (ref 0–0.1)
BASOPHILS NFR BLD: 1 % (ref 0–1)
BILIRUB SERPL-MCNC: 0.8 MG/DL (ref 0.2–1)
BILIRUB UR QL: NEGATIVE
BUN SERPL-MCNC: 20 MG/DL (ref 6–20)
BUN/CREAT SERPL: 14 (ref 12–20)
CA-I BLD-MCNC: 9.8 MG/DL (ref 8.5–10.1)
CHLORIDE SERPL-SCNC: 102 MMOL/L (ref 97–108)
CO2 SERPL-SCNC: 26 MMOL/L (ref 21–32)
COLOR UR: ABNORMAL
CREAT SERPL-MCNC: 1.47 MG/DL (ref 0.7–1.3)
DIFFERENTIAL METHOD BLD: ABNORMAL
EOSINOPHIL # BLD: 0.1 K/UL (ref 0–0.4)
EOSINOPHIL NFR BLD: 1 % (ref 0–7)
ERYTHROCYTE [DISTWIDTH] IN BLOOD BY AUTOMATED COUNT: 12.1 % (ref 11.5–14.5)
GLOBULIN SER CALC-MCNC: 4.3 G/DL (ref 2–4)
GLUCOSE SERPL-MCNC: 303 MG/DL (ref 65–100)
GLUCOSE UR STRIP.AUTO-MCNC: 150 MG/DL
HCT VFR BLD AUTO: 32.4 % (ref 36.6–50.3)
HGB BLD-MCNC: 11.3 G/DL (ref 12.1–17)
HGB UR QL STRIP: NEGATIVE
HYALINE CASTS URNS QL MICRO: ABNORMAL /LPF (ref 0–5)
IMM GRANULOCYTES # BLD AUTO: 0 K/UL (ref 0–0.04)
IMM GRANULOCYTES NFR BLD AUTO: 0 % (ref 0–0.5)
KETONES UR QL STRIP.AUTO: 5 MG/DL
LEUKOCYTE ESTERASE UR QL STRIP.AUTO: NEGATIVE
LYMPHOCYTES # BLD: 1.6 K/UL (ref 0.8–3.5)
LYMPHOCYTES NFR BLD: 22 % (ref 12–49)
MCH RBC QN AUTO: 29 PG (ref 26–34)
MCHC RBC AUTO-ENTMCNC: 34.9 G/DL (ref 30–36.5)
MCV RBC AUTO: 83.1 FL (ref 80–99)
MONOCYTES # BLD: 0.7 K/UL (ref 0–1)
MONOCYTES NFR BLD: 9 % (ref 5–13)
MUCOUS THREADS URNS QL MICRO: ABNORMAL /LPF
NEUTS SEG # BLD: 5.1 K/UL (ref 1.8–8)
NEUTS SEG NFR BLD: 67 % (ref 32–75)
NITRITE UR QL STRIP.AUTO: NEGATIVE
NRBC # BLD: 0 K/UL (ref 0–0.01)
NRBC BLD-RTO: 0 PER 100 WBC
PH UR STRIP: 5 (ref 5–8)
PLATELET # BLD AUTO: 353 K/UL (ref 150–400)
PMV BLD AUTO: 11.1 FL (ref 8.9–12.9)
POTASSIUM SERPL-SCNC: 3.3 MMOL/L (ref 3.5–5.1)
PROT SERPL-MCNC: 8.5 G/DL (ref 6.4–8.2)
PROT UR STRIP-MCNC: 30 MG/DL
RBC # BLD AUTO: 3.9 M/UL (ref 4.1–5.7)
RBC #/AREA URNS HPF: ABNORMAL /HPF (ref 0–5)
SODIUM SERPL-SCNC: 136 MMOL/L (ref 136–145)
SP GR UR REFRACTOMETRY: 1.01 (ref 1–1.03)
URINE CULTURE IF INDICATED: ABNORMAL
UROBILINOGEN UR QL STRIP.AUTO: 0.1 EU/DL (ref 0.1–1)
WBC # BLD AUTO: 7.6 K/UL (ref 4.1–11.1)
WBC URNS QL MICRO: ABNORMAL /HPF (ref 0–4)

## 2024-02-04 PROCEDURE — 81001 URINALYSIS AUTO W/SCOPE: CPT

## 2024-02-04 PROCEDURE — 80053 COMPREHEN METABOLIC PANEL: CPT

## 2024-02-04 PROCEDURE — 6370000000 HC RX 637 (ALT 250 FOR IP): Performed by: STUDENT IN AN ORGANIZED HEALTH CARE EDUCATION/TRAINING PROGRAM

## 2024-02-04 PROCEDURE — 99283 EMERGENCY DEPT VISIT LOW MDM: CPT

## 2024-02-04 PROCEDURE — 85025 COMPLETE CBC W/AUTO DIFF WBC: CPT

## 2024-02-04 PROCEDURE — 36415 COLL VENOUS BLD VENIPUNCTURE: CPT

## 2024-02-04 RX ORDER — OXYCODONE HYDROCHLORIDE 5 MG/1
5 TABLET ORAL EVERY 6 HOURS PRN
Qty: 12 TABLET | Refills: 0 | Status: SHIPPED | OUTPATIENT
Start: 2024-02-04 | End: 2024-02-07

## 2024-02-04 RX ORDER — CEPHALEXIN 500 MG/1
500 CAPSULE ORAL 2 TIMES DAILY
Qty: 14 CAPSULE | Refills: 0 | Status: SHIPPED | OUTPATIENT
Start: 2024-02-04 | End: 2024-02-11

## 2024-02-04 RX ORDER — CEPHALEXIN 500 MG/1
500 CAPSULE ORAL ONCE
Status: DISCONTINUED | OUTPATIENT
Start: 2024-02-04 | End: 2024-02-04 | Stop reason: HOSPADM

## 2024-02-04 RX ORDER — ACETAMINOPHEN 325 MG/1
650 TABLET ORAL
Status: COMPLETED | OUTPATIENT
Start: 2024-02-04 | End: 2024-02-04

## 2024-02-04 RX ADMIN — ACETAMINOPHEN 650 MG: 325 TABLET ORAL at 06:04

## 2024-02-04 NOTE — ED PROVIDER NOTES
Genitourinary:     Comments: Well-appearing penile surgical site, no active bleeding, hemostatic, no erythema, no induration or fluctuance  Musculoskeletal:         General: No deformity or signs of injury.      Cervical back: Normal range of motion and neck supple.   Skin:     General: Skin is warm and dry.   Neurological:      General: No focal deficit present.      Mental Status: He is alert and oriented to person, place, and time.   Psychiatric:         Mood and Affect: Mood normal.         Behavior: Behavior normal.           SCREENINGS                   LAB, EKG AND DIAGNOSTIC RESULTS   Labs:  Recent Results (from the past 12 hour(s))   CBC with Auto Differential    Collection Time: 02/04/24  5:55 AM   Result Value Ref Range    WBC 7.6 4.1 - 11.1 K/uL    RBC 3.90 (L) 4.10 - 5.70 M/uL    Hemoglobin 11.3 (L) 12.1 - 17.0 g/dL    Hematocrit 32.4 (L) 36.6 - 50.3 %    MCV 83.1 80.0 - 99.0 FL    MCH 29.0 26.0 - 34.0 PG    MCHC 34.9 30.0 - 36.5 g/dL    RDW 12.1 11.5 - 14.5 %    Platelets 353 150 - 400 K/uL    MPV 11.1 8.9 - 12.9 FL    Nucleated RBCs 0.0 0.0  WBC    nRBC 0.00 0.00 - 0.01 K/uL    Neutrophils % 67 32 - 75 %    Lymphocytes % 22 12 - 49 %    Monocytes % 9 5 - 13 %    Eosinophils % 1 0 - 7 %    Basophils % 1 0 - 1 %    Immature Granulocytes 0 0 - 0.5 %    Neutrophils Absolute 5.1 1.8 - 8.0 K/UL    Lymphocytes Absolute 1.6 0.8 - 3.5 K/UL    Monocytes Absolute 0.7 0.0 - 1.0 K/UL    Eosinophils Absolute 0.1 0.0 - 0.4 K/UL    Basophils Absolute 0.0 0.0 - 0.1 K/UL    Absolute Immature Granulocyte 0.0 0.00 - 0.04 K/UL    Differential Type AUTOMATED     Comprehensive Metabolic Panel    Collection Time: 02/04/24  5:55 AM   Result Value Ref Range    Sodium 136 136 - 145 mmol/L    Potassium 3.3 (L) 3.5 - 5.1 mmol/L    Chloride 102 97 - 108 mmol/L    CO2 26 21 - 32 mmol/L    Anion Gap 8 5 - 15 mmol/L    Glucose 303 (H) 65 - 100 mg/dL    BUN 20 6 - 20 mg/dL    Creatinine 1.47 (H) 0.70 - 1.30 mg/dL    Bun/Cre

## 2024-02-04 NOTE — DISCHARGE INSTRUCTIONS
Thank you!  Thank you for allowing me to care for you in the emergency department. It is my goal to provide you with excellent care.  Please fill out the survey that will come to you by mail or email since we listen to your feedback!     Below you will find a list of your tests from today's visit.  Should you have any questions, please do not hesitate to call the emergency department.    Labs  Recent Results (from the past 12 hour(s))   CBC with Auto Differential    Collection Time: 02/04/24  5:55 AM   Result Value Ref Range    WBC 7.6 4.1 - 11.1 K/uL    RBC 3.90 (L) 4.10 - 5.70 M/uL    Hemoglobin 11.3 (L) 12.1 - 17.0 g/dL    Hematocrit 32.4 (L) 36.6 - 50.3 %    MCV 83.1 80.0 - 99.0 FL    MCH 29.0 26.0 - 34.0 PG    MCHC 34.9 30.0 - 36.5 g/dL    RDW 12.1 11.5 - 14.5 %    Platelets 353 150 - 400 K/uL    MPV 11.1 8.9 - 12.9 FL    Nucleated RBCs 0.0 0.0  WBC    nRBC 0.00 0.00 - 0.01 K/uL    Neutrophils % 67 32 - 75 %    Lymphocytes % 22 12 - 49 %    Monocytes % 9 5 - 13 %    Eosinophils % 1 0 - 7 %    Basophils % 1 0 - 1 %    Immature Granulocytes 0 0 - 0.5 %    Neutrophils Absolute 5.1 1.8 - 8.0 K/UL    Lymphocytes Absolute 1.6 0.8 - 3.5 K/UL    Monocytes Absolute 0.7 0.0 - 1.0 K/UL    Eosinophils Absolute 0.1 0.0 - 0.4 K/UL    Basophils Absolute 0.0 0.0 - 0.1 K/UL    Absolute Immature Granulocyte 0.0 0.00 - 0.04 K/UL    Differential Type AUTOMATED     Comprehensive Metabolic Panel    Collection Time: 02/04/24  5:55 AM   Result Value Ref Range    Sodium 136 136 - 145 mmol/L    Potassium 3.3 (L) 3.5 - 5.1 mmol/L    Chloride 102 97 - 108 mmol/L    CO2 26 21 - 32 mmol/L    Anion Gap 8 5 - 15 mmol/L    Glucose 303 (H) 65 - 100 mg/dL    BUN 20 6 - 20 mg/dL    Creatinine 1.47 (H) 0.70 - 1.30 mg/dL    Bun/Cre Ratio 14 12 - 20      Est, Glom Filt Rate 54 (L) >60 ml/min/1.73m2    Calcium 9.8 8.5 - 10.1 mg/dL    Total Bilirubin 0.8 0.2 - 1.0 mg/dL    AST 39 (H) 15 - 37 U/L    ALT 39 12 - 78 U/L    Alk Phosphatase 106

## 2024-02-05 ENCOUNTER — TELEPHONE (OUTPATIENT)
Age: 59
End: 2024-02-05

## 2024-02-05 NOTE — TELEPHONE ENCOUNTER
Pt called needing to speak with Dr. Darius OLIVEIRA in regards to him having constant bleeding after his surgery. He stated the surgery he had did not work and wanted to talk to darius camejo immediately

## 2024-02-05 NOTE — TELEPHONE ENCOUNTER
Pt called back and I offer vv for 11:45 today. The Pt declined virtual visit and hung up on me while I was conferring with ervin about his options.

## 2024-02-05 NOTE — TELEPHONE ENCOUNTER
Patient deferred appointment with Dr. Mccoy today.He will see Dr. Mitchell tomorrow. Reports oozing blood on and off.

## 2024-02-05 NOTE — TELEPHONE ENCOUNTER
Tj for pt asking if he could do a virtual appt today at 11:45 with dr. Mccoy .He called back and [place] offered him appt date and time, he did not want to do. He has a scheduled appt tomorrow with Dr. Mitchell already for follow up after surgery he had with him

## 2024-02-06 ENCOUNTER — OFFICE VISIT (OUTPATIENT)
Age: 59
End: 2024-02-06

## 2024-02-06 VITALS
WEIGHT: 238 LBS | SYSTOLIC BLOOD PRESSURE: 129 MMHG | HEART RATE: 75 BPM | BODY MASS INDEX: 35.25 KG/M2 | DIASTOLIC BLOOD PRESSURE: 77 MMHG | HEIGHT: 69 IN

## 2024-02-06 DIAGNOSIS — N48.89 PENILE EDEMA: ICD-10-CM

## 2024-02-06 DIAGNOSIS — N52.9 ERECTILE DYSFUNCTION, UNSPECIFIED ERECTILE DYSFUNCTION TYPE: Primary | ICD-10-CM

## 2024-02-06 PROCEDURE — 99024 POSTOP FOLLOW-UP VISIT: CPT | Performed by: STUDENT IN AN ORGANIZED HEALTH CARE EDUCATION/TRAINING PROGRAM

## 2024-02-06 NOTE — PROGRESS NOTES
Chief Complaint   Patient presents with    Follow Up After Procedure     1. Have you been to the ER, urgent care clinic since your last visit?  Hospitalized since your last visit?Yes When: 2/4/24 Where: [facility] Reason for visit: Penile bleeding and pain.    2. Have you seen or consulted any other health care providers outside of the [facility] since your last visit?  Include any pap smears or colon screening. No  /77 (Site: Left Upper Arm, Position: Sitting, Cuff Size: Medium Adult)   Pulse 75   Ht 1.753 m (5' 9\")   Wt 108 kg (238 lb)   BMI 35.15 kg/m²

## 2024-02-06 NOTE — ASSESSMENT & PLAN NOTE
58-year-old male with significant history of erectile dysfunction secondary to severe diabetes and hypertension.  The patient had significant corporal scarring that required careful dilation during his operation.  This did not result in any unexpected intraoperative complications.  We performed the normal safety checks during implantation of an inflatable penile prosthesis and there were no injuries to the corpora or to the urethra.  I did perform a small scrotoplasty as there was some penoscrotal webbing during the procedure.  I started with a normal horizontal penoscrotal incision and closed the skin in a longitudinal fashion to along the penile/scrotal raphe to decrease penoscrotal webbing. Due to this, the patient believes that I intentionally shortened his penis. I reassured him numerous times throughout today's visit that I performed his procedure as I would perform any inflatable penile prosthesis. I measured his corporal bodies and gave him the appropriate size. The device is working appropriately and inflates as expected. He continued to perseverate throughout the visit today that his penis was not as long or as big as it was prior to the operation.     I offered that he obtain a second opinion from an outside Urologist which he declined, stating \"no one is going to touch me because you put this thing in me.\" I also offered to provide intermittent evaluations of the device to ensure resolution of the hematoma and ensure no signs of infection. He requested that I remove the device entirely, which I advised against as this would cause scarring of the corporal bodies and almost certainly result in penile shortening which appears to be Mr. Barrera's biggest concern. I also offered to revise or replace the device, with the caveat that the device length/girth would likely not change. He appeared dissatisfied with all recommendations I provided him today. I provided him with 4x4 gauze and tape to help absorb the

## 2024-02-06 NOTE — PROGRESS NOTES
HISTORY OF PRESENT ILLNESS  José Barrera is a 58 y.o. male.  Chief Complaint   Patient presents with    Follow Up After Procedure       Mr. Barrera is a 58-year-old male with a longstanding history of erectile dysfunction secondary to severe diabetes and hypertension who was previously on intracorporeal injections.  He agreed to undergo a penile prosthesis and prior to the procedure we reviewed the risks and benefits of the procedure.  After his procedure he was discharged on postop day 1 and returned approximately 1 week later with significant penile swelling and was admitted for concern for infection of the device.  During that admission he was placed on IV antibiotics and a pressure dressing was applied to the penis to help with swelling.  He was afebrile with a normal white blood cell count prior to admission and remained afebrile throughout.  He was subsequently discharged on oral antibiotics and oral antifungals for prophylaxis against infection.  Since that time he has been in frequent communication with our clinic as well as a visit to the [facility] emergency department as well as the [facility] emergency department for concern that there was significant bleeding from his incision and that the device was not functioning properly.  During all visits he was reassured and provided with a prescription for prophylactic antibiotics during his trip to the [facility] for prophylaxis against skin infection.  He has been inflating his device at home and has noted that it is firm and rigid but that his penis is not as long or wide as it was previously and he is very dissatisfied with this.  Prior to his procedure I reviewed that this device would not increase the length or girth of the penis but would only help him achieve an erection firm enough for sexual activity.    During today's visit the patient recorded our conversation and reiterated numerous times that he was significantly displeased

## 2024-02-07 DIAGNOSIS — L76.32 POSTOPERATIVE HEMATOMA OF SKIN FOLLOWING NON-DERMATOLOGIC PROCEDURE: Primary | ICD-10-CM

## 2024-02-07 RX ORDER — DOXYCYCLINE HYCLATE 100 MG
100 TABLET ORAL 2 TIMES DAILY
Qty: 14 TABLET | Refills: 0 | Status: SHIPPED | OUTPATIENT
Start: 2024-02-07 | End: 2024-02-14

## 2024-02-07 NOTE — PROGRESS NOTES
I called Mr. Barrera this morning to follow up our conversation yesterday. He stated he was still having some drainage from his incision. It has not worsened, but is persistent. I offered Mr. Gage the option of drainage, closure, and placement of a drain in the operating room, however, he declined this. We will try to conservatively manage this small draining hematoma without surgical intervention. I am sending him a week course of doxycycline for prophylaxis against an infected hematoma/prosthetic.

## 2024-03-05 ENCOUNTER — TELEPHONE (OUTPATIENT)
Age: 59
End: 2024-03-05

## 2024-03-05 NOTE — TELEPHONE ENCOUNTER
The patient called back, reporting having pain and possible infection. I offered the patient an appointment with Dr. Mitchell,he hanged up the phone.

## 2024-03-05 NOTE — TELEPHONE ENCOUNTER
Pt came in office asking if Dr. Mitchell could prescribe him some more pain medication and antibiotics because he is still having pain in his penis. Needs it sent to [facility] in [facility]

## 2024-03-06 NOTE — TELEPHONE ENCOUNTER
Pt called in reference to this interaction. I let pt know if he would need an appt to see dr. Mitchell in regards to the above messages. He accepted and was scheduled for Friday 3/8 at 10am.

## 2024-03-14 ENCOUNTER — OFFICE VISIT (OUTPATIENT)
Age: 59
End: 2024-03-14
Payer: MEDICARE

## 2024-03-14 VITALS
WEIGHT: 238 LBS | HEART RATE: 76 BPM | SYSTOLIC BLOOD PRESSURE: 130 MMHG | HEIGHT: 69 IN | DIASTOLIC BLOOD PRESSURE: 81 MMHG | BODY MASS INDEX: 35.25 KG/M2

## 2024-03-14 DIAGNOSIS — N52.9 ERECTILE DYSFUNCTION, UNSPECIFIED ERECTILE DYSFUNCTION TYPE: Primary | ICD-10-CM

## 2024-03-14 PROCEDURE — 99214 OFFICE O/P EST MOD 30 MIN: CPT | Performed by: STUDENT IN AN ORGANIZED HEALTH CARE EDUCATION/TRAINING PROGRAM

## 2024-03-14 PROCEDURE — 3079F DIAST BP 80-89 MM HG: CPT | Performed by: STUDENT IN AN ORGANIZED HEALTH CARE EDUCATION/TRAINING PROGRAM

## 2024-03-14 PROCEDURE — 3075F SYST BP GE 130 - 139MM HG: CPT | Performed by: STUDENT IN AN ORGANIZED HEALTH CARE EDUCATION/TRAINING PROGRAM

## 2024-03-14 RX ORDER — CEPHALEXIN 500 MG/1
500 CAPSULE ORAL 2 TIMES DAILY
Qty: 14 CAPSULE | Refills: 0 | Status: SHIPPED | OUTPATIENT
Start: 2024-03-14 | End: 2024-03-21

## 2024-03-14 RX ORDER — TRAMADOL HYDROCHLORIDE 50 MG/1
50 TABLET ORAL EVERY 4 HOURS PRN
Qty: 18 TABLET | Refills: 0 | Status: SHIPPED | OUTPATIENT
Start: 2024-03-14 | End: 2024-03-17

## 2024-03-14 NOTE — ASSESSMENT & PLAN NOTE
58-year-old male with erectile dysfunction status post insertion of inflatable penile prosthesis on January 10, 2024 who returns today for routine follow-up.  During his postop course he had some issues with drainage from his penoscrotal incision as well as penile swelling which have resolved at this point.  He has been using device without issues inflating or deflating it.  He does have some mild occasional pain associated with the pump during inflation and deflation which is likely postprocedural in nature and will continue to improve.  He is satisfied with the result and has been able to engage in sexual activity.  He will follow-up as needed in the future.

## 2024-03-14 NOTE — PROGRESS NOTES
Chief Complaint   Patient presents with    Follow-up    Erectile Dysfunction     1. Have you been to the ER, urgent care clinic since your last visit?  Hospitalized since your last visit?No    2. Have you seen or consulted any other health care providers outside of the [facility] since your last visit?  Include any pap smears or colon screening. No  /81 (Site: Left Upper Arm, Position: Sitting, Cuff Size: Medium Adult)   Pulse 76   Ht 1.753 m (5' 9\")   Wt 108 kg (238 lb)   BMI 35.15 kg/m²     
with Dragon dictation, the computer voice recognition software.  Quite often unanticipated grammatical, syntax, homophones, and other interpretive errors are inadvertently transcribed by the computer software.  Please disregard these errors and any other errors that may have escaped final proofreading.  Thank you.

## 2024-07-23 RX ORDER — LOPERAMIDE HCL 2 MG
CAPSULE ORAL
Qty: 28 CAPSULE | Refills: 0 | OUTPATIENT
Start: 2024-07-23

## 2024-08-03 ENCOUNTER — HOSPITAL ENCOUNTER (EMERGENCY)
Facility: HOSPITAL | Age: 59
Discharge: HOME | End: 2024-08-03
Attending: EMERGENCY MEDICINE
Payer: MEDICARE

## 2024-08-03 VITALS
HEIGHT: 69 IN | WEIGHT: 230 LBS | DIASTOLIC BLOOD PRESSURE: 88 MMHG | TEMPERATURE: 97.8 F | OXYGEN SATURATION: 96 % | BODY MASS INDEX: 34.07 KG/M2 | HEART RATE: 86 BPM | RESPIRATION RATE: 18 BRPM | SYSTOLIC BLOOD PRESSURE: 142 MMHG

## 2024-08-03 DIAGNOSIS — F19.10 SUBSTANCE ABUSE (HCC): Primary | ICD-10-CM

## 2024-08-03 PROCEDURE — 99282 EMERGENCY DEPT VISIT SF MDM: CPT

## 2024-08-03 ASSESSMENT — PAIN - FUNCTIONAL ASSESSMENT: PAIN_FUNCTIONAL_ASSESSMENT: NONE - DENIES PAIN

## 2024-08-03 NOTE — ED PROVIDER NOTES
[facility] EMERGENCY DEPT  EMERGENCY DEPARTMENT HISTORY AND PHYSICAL EXAM      Date: 8/3/2024  Patient Name: José Barrera  MRN: [id number]  Birthdate 1965  Date of evaluation: 8/3/2024  Provider: Serena Landa MD   Note Started: 11:46 AM EDT 8/3/24    HISTORY OF PRESENT ILLNESS     Chief Complaint   Patient presents with    Addiction Problem       History Provided By: Patient    HPI: José Barrera is a 58 y.o. male USE OF CRACK COCAINE, NO CP OR SOB, REQUESTING  RESOURCES    PAST MEDICAL HISTORY   Past Medical History:  Past Medical History:   Diagnosis Date    Arteriosclerotic gangrene (HCC) 07/15/2020    Arthritis     Atherosclerosis of arteries of extremities (HCC) 07/15/2020    Cataract 07/15/2020    Coronary arteriosclerosis in native artery 07/15/2020    Diabetes mellitus type 2, controlled (HCC) 07/15/2020    Diabetic mononeuropathy associated with type 2 diabetes mellitus (HCC) 07/15/2020    Diabetic peripheral neuropathy (HCC) 07/15/2020    EKG, abnormal 07/15/2020    Elevated liver enzymes 07/15/2020    Erectile dysfunction 07/15/2020    Essential hypertension 07/15/2020    Hepatitis     High cholesterol     Hyperlipidemia 07/15/2020    Hypertension     Pain in both lower legs 07/15/2020    Peripheral arterial occlusive disease (HCC) 07/15/2020    Peripheral circulatory disorder associated with type 2 diabetes mellitus (HCC) 07/15/2020    Spasm of back muscles 07/15/2020    Stroke (HCC)     Ulcer of foot (HCC) 07/15/2020       Past Surgical History:  Past Surgical History:   Procedure Laterality Date    CARDIAC CATHETERIZATION      CATARACT REMOVAL Bilateral     ILIO-FEMORAL BYPASS GRAFT  11/11/2022    IR STENT IMAG VASCULAR INITIAL VEIN      Right Leg    ORTHOPEDIC SURGERY Left     ORTHOPEDIC SURGERY Right     stents placed    PENILE PROSTHESIS PLACEMENT N/A 1/10/2024    INSERTION OF INFLATABLE PENILE PROSTHESIS performed by Julian Mitchell MD at [facility] MAIN OR    WISDOM TOOTH EXTRACTION         Family

## 2024-08-03 NOTE — BSMART NOTE
This writer contacted to provide resources for addiction concerns.  Pt states he is seeking inpt treatment for crack cocaine use.  Pt denies HI/SI/AVH.  Pt goal directed with clear thoughts.  Pt provided with resources for local inpt/outpt treatment options, as well as crisis and substance use resources.  Dr Landa aware

## 2024-11-04 ENCOUNTER — HOSPITAL ENCOUNTER (EMERGENCY)
Facility: HOSPITAL | Age: 59
Discharge: HOME | End: 2024-11-05
Attending: EMERGENCY MEDICINE
Payer: MEDICARE

## 2024-11-04 DIAGNOSIS — E11.65 TYPE 2 DIABETES MELLITUS WITH HYPERGLYCEMIA, WITH LONG-TERM CURRENT USE OF INSULIN (HCC): Primary | ICD-10-CM

## 2024-11-04 DIAGNOSIS — Z76.0 ENCOUNTER FOR MEDICATION REFILL: ICD-10-CM

## 2024-11-04 DIAGNOSIS — Z79.4 TYPE 2 DIABETES MELLITUS WITH HYPERGLYCEMIA, WITH LONG-TERM CURRENT USE OF INSULIN (HCC): Primary | ICD-10-CM

## 2024-11-04 DIAGNOSIS — F19.90 POLYSUBSTANCE USE DISORDER: ICD-10-CM

## 2024-11-04 LAB
ANION GAP BLD CALC-SCNC: 10
BASOPHILS # BLD: 0 K/UL (ref 0–0.1)
BASOPHILS NFR BLD: 1 % (ref 0–1)
CA-I BLD-MCNC: 1.13 MMOL/L (ref 1.12–1.32)
CHLORIDE BLD-SCNC: 99 MMOL/L (ref 98–107)
CO2 BLD-SCNC: 30 MMOL/L
CREAT UR-MCNC: 1.09 MG/DL (ref 0.6–1.3)
DIFFERENTIAL METHOD BLD: ABNORMAL
EOSINOPHIL # BLD: 0 K/UL (ref 0–0.4)
EOSINOPHIL NFR BLD: 1 % (ref 0–7)
ERYTHROCYTE [DISTWIDTH] IN BLOOD BY AUTOMATED COUNT: 11.9 % (ref 11.5–14.5)
GLUCOSE BLD STRIP.AUTO-MCNC: 326 MG/DL (ref 65–100)
HCT VFR BLD AUTO: 36.4 % (ref 36.6–50.3)
HGB BLD-MCNC: 12.6 G/DL (ref 12.1–17)
IMM GRANULOCYTES # BLD AUTO: 0 K/UL (ref 0–0.04)
IMM GRANULOCYTES NFR BLD AUTO: 0 % (ref 0–0.5)
LYMPHOCYTES # BLD: 2 K/UL (ref 0.8–3.5)
LYMPHOCYTES NFR BLD: 27 % (ref 12–49)
MCH RBC QN AUTO: 29.2 PG (ref 26–34)
MCHC RBC AUTO-ENTMCNC: 34.6 G/DL (ref 30–36.5)
MCV RBC AUTO: 84.3 FL (ref 80–99)
MONOCYTES # BLD: 0.7 K/UL (ref 0–1)
MONOCYTES NFR BLD: 10 % (ref 5–13)
NEUTS SEG # BLD: 4.4 K/UL (ref 1.8–8)
NEUTS SEG NFR BLD: 61 % (ref 32–75)
NRBC # BLD: 0 K/UL (ref 0–0.01)
NRBC BLD-RTO: 0 PER 100 WBC
PLATELET # BLD AUTO: 325 K/UL (ref 150–400)
PMV BLD AUTO: 11.1 FL (ref 8.9–12.9)
POTASSIUM BLD-SCNC: 4.1 MMOL/L (ref 3.5–5.5)
RBC # BLD AUTO: 4.32 M/UL (ref 4.1–5.7)
SODIUM BLD-SCNC: 138 MMOL/L (ref 136–145)
WBC # BLD AUTO: 7.2 K/UL (ref 4.1–11.1)

## 2024-11-04 PROCEDURE — 80076 HEPATIC FUNCTION PANEL: CPT

## 2024-11-04 PROCEDURE — 93005 ELECTROCARDIOGRAM TRACING: CPT | Performed by: EMERGENCY MEDICINE

## 2024-11-04 PROCEDURE — 80047 BASIC METABLC PNL IONIZED CA: CPT

## 2024-11-04 PROCEDURE — 82077 ASSAY SPEC XCP UR&BREATH IA: CPT

## 2024-11-04 PROCEDURE — 81001 URINALYSIS AUTO W/SCOPE: CPT

## 2024-11-04 PROCEDURE — 99284 EMERGENCY DEPT VISIT MOD MDM: CPT

## 2024-11-04 PROCEDURE — 80307 DRUG TEST PRSMV CHEM ANLYZR: CPT

## 2024-11-04 PROCEDURE — 36415 COLL VENOUS BLD VENIPUNCTURE: CPT

## 2024-11-04 PROCEDURE — 85025 COMPLETE CBC W/AUTO DIFF WBC: CPT

## 2024-11-04 PROCEDURE — 80048 BASIC METABOLIC PNL TOTAL CA: CPT

## 2024-11-04 ASSESSMENT — PAIN - FUNCTIONAL ASSESSMENT: PAIN_FUNCTIONAL_ASSESSMENT: 0-10

## 2024-11-04 ASSESSMENT — LIFESTYLE VARIABLES
HOW OFTEN DO YOU HAVE A DRINK CONTAINING ALCOHOL: 2-4 TIMES A MONTH
HOW MANY STANDARD DRINKS CONTAINING ALCOHOL DO YOU HAVE ON A TYPICAL DAY: 1 OR 2

## 2024-11-04 ASSESSMENT — PAIN DESCRIPTION - LOCATION: LOCATION: BACK

## 2024-11-04 ASSESSMENT — PAIN SCALES - GENERAL: PAINLEVEL_OUTOF10: 6

## 2024-11-05 VITALS
HEIGHT: 73 IN | BODY MASS INDEX: 31.14 KG/M2 | TEMPERATURE: 98 F | OXYGEN SATURATION: 99 % | HEART RATE: 83 BPM | RESPIRATION RATE: 20 BRPM | WEIGHT: 235 LBS | SYSTOLIC BLOOD PRESSURE: 121 MMHG | DIASTOLIC BLOOD PRESSURE: 84 MMHG

## 2024-11-05 LAB
ALBUMIN SERPL-MCNC: 4.2 G/DL (ref 3.5–5)
ALBUMIN/GLOB SERPL: 1.1 (ref 1.1–2.2)
ALP SERPL-CCNC: 85 U/L (ref 45–117)
ALT SERPL-CCNC: 38 U/L (ref 12–78)
AMPHET UR QL SCN: NEGATIVE
ANION GAP SERPL CALC-SCNC: 7 MMOL/L (ref 2–12)
APPEARANCE UR: CLEAR
AST SERPL W P-5'-P-CCNC: 62 U/L (ref 15–37)
BACTERIA URNS QL MICRO: NEGATIVE /HPF
BARBITURATES UR QL SCN: NEGATIVE
BENZODIAZ UR QL: NEGATIVE
BILIRUB DIRECT SERPL-MCNC: 0.2 MG/DL (ref 0–0.2)
BILIRUB SERPL-MCNC: 1 MG/DL (ref 0.2–1)
BILIRUB UR QL: NEGATIVE
BUN SERPL-MCNC: 19 MG/DL (ref 6–20)
BUN/CREAT SERPL: 14 (ref 12–20)
CA-I BLD-MCNC: 9 MG/DL (ref 8.5–10.1)
CANNABINOIDS UR QL SCN: NEGATIVE
CHLORIDE SERPL-SCNC: 101 MMOL/L (ref 97–108)
CO2 SERPL-SCNC: 30 MMOL/L (ref 21–32)
COCAINE UR QL SCN: POSITIVE
COLOR UR: ABNORMAL
CREAT SERPL-MCNC: 1.33 MG/DL (ref 0.7–1.3)
EKG ATRIAL RATE: 88 BPM
EKG DIAGNOSIS: NORMAL
EKG P AXIS: 54 DEGREES
EKG P-R INTERVAL: 160 MS
EKG Q-T INTERVAL: 388 MS
EKG QRS DURATION: 96 MS
EKG QTC CALCULATION (BAZETT): 469 MS
EKG R AXIS: 62 DEGREES
EKG T AXIS: 110 DEGREES
EKG VENTRICULAR RATE: 88 BPM
EPITH CASTS URNS QL MICRO: ABNORMAL /LPF
ETHANOL SERPL-MCNC: <10 MG/DL (ref 0–0.08)
GLOBULIN SER CALC-MCNC: 3.9 G/DL (ref 2–4)
GLUCOSE BLD STRIP.AUTO-MCNC: 208 MG/DL (ref 65–100)
GLUCOSE BLD STRIP.AUTO-MCNC: 290 MG/DL (ref 65–100)
GLUCOSE SERPL-MCNC: 321 MG/DL (ref 65–100)
GLUCOSE UR STRIP.AUTO-MCNC: >500 MG/DL
HGB UR QL STRIP: ABNORMAL
HYALINE CASTS URNS QL MICRO: ABNORMAL /LPF (ref 0–5)
KETONES UR QL STRIP.AUTO: 5 MG/DL
LEUKOCYTE ESTERASE UR QL STRIP.AUTO: NEGATIVE
Lab: ABNORMAL
METHADONE UR QL: NEGATIVE
MUCOUS THREADS URNS QL MICRO: NEGATIVE /LPF
NITRITE UR QL STRIP.AUTO: NEGATIVE
OPIATES UR QL: NEGATIVE
PCP UR QL: NEGATIVE
PERFORMED BY:: ABNORMAL
PERFORMED BY:: ABNORMAL
PH UR STRIP: 5 (ref 5–8)
POTASSIUM SERPL-SCNC: 3.2 MMOL/L (ref 3.5–5.1)
PROT SERPL-MCNC: 8.1 G/DL (ref 6.4–8.2)
PROT UR STRIP-MCNC: 30 MG/DL
RBC #/AREA URNS HPF: ABNORMAL /HPF (ref 0–5)
SODIUM SERPL-SCNC: 138 MMOL/L (ref 136–145)
SP GR UR REFRACTOMETRY: 1.01 (ref 1–1.03)
UROBILINOGEN UR QL STRIP.AUTO: 0.1 EU/DL (ref 0.1–1)
WBC URNS QL MICRO: ABNORMAL /HPF (ref 0–4)

## 2024-11-05 PROCEDURE — 82962 GLUCOSE BLOOD TEST: CPT

## 2024-11-05 PROCEDURE — 6370000000 HC RX 637 (ALT 250 FOR IP): Performed by: EMERGENCY MEDICINE

## 2024-11-05 RX ORDER — INSULIN GLARGINE 100 [IU]/ML
INJECTION, SOLUTION SUBCUTANEOUS
Qty: 5 ADJUSTABLE DOSE PRE-FILLED PEN SYRINGE | Refills: 0 | Status: SHIPPED | OUTPATIENT
Start: 2024-11-05

## 2024-11-05 RX ORDER — BLOOD SUGAR DIAGNOSTIC
STRIP MISCELLANEOUS
Qty: 100 EACH | Refills: 0 | Status: SHIPPED | OUTPATIENT
Start: 2024-11-05

## 2024-11-05 RX ORDER — PEN NEEDLE, DIABETIC 32GX 5/32"
NEEDLE, DISPOSABLE MISCELLANEOUS
Qty: 100 EACH | Refills: 0 | Status: SHIPPED | OUTPATIENT
Start: 2024-11-05

## 2024-11-05 RX ORDER — LANCETS 33 GAUGE
EACH MISCELLANEOUS
Qty: 200 EACH | Refills: 5 | Status: SHIPPED | OUTPATIENT
Start: 2024-11-05

## 2024-11-05 RX ORDER — INSULIN LISPRO 100 [IU]/ML
5 INJECTION, SOLUTION INTRAVENOUS; SUBCUTANEOUS ONCE
Status: COMPLETED | OUTPATIENT
Start: 2024-11-05 | End: 2024-11-05

## 2024-11-05 RX ORDER — INSULIN ASPART 100 [IU]/ML
INJECTION, SOLUTION INTRAVENOUS; SUBCUTANEOUS
Qty: 5 ADJUSTABLE DOSE PRE-FILLED PEN SYRINGE | Refills: 0 | Status: SHIPPED | OUTPATIENT
Start: 2024-11-05

## 2024-11-05 RX ORDER — INSULIN GLARGINE 100 [IU]/ML
30 INJECTION, SOLUTION SUBCUTANEOUS
Status: COMPLETED | OUTPATIENT
Start: 2024-11-05 | End: 2024-11-05

## 2024-11-05 RX ORDER — POTASSIUM CHLORIDE 1500 MG/1
40 TABLET, EXTENDED RELEASE ORAL ONCE
Status: COMPLETED | OUTPATIENT
Start: 2024-11-05 | End: 2024-11-05

## 2024-11-05 RX ADMIN — Medication 1 LOZENGE: at 02:40

## 2024-11-05 RX ADMIN — POTASSIUM CHLORIDE 40 MEQ: 1500 TABLET, EXTENDED RELEASE ORAL at 01:05

## 2024-11-05 RX ADMIN — INSULIN GLARGINE 30 UNITS: 100 INJECTION, SOLUTION SUBCUTANEOUS at 01:01

## 2024-11-05 RX ADMIN — INSULIN LISPRO 5 UNITS: 100 INJECTION, SOLUTION INTRAVENOUS; SUBCUTANEOUS at 01:01

## 2024-11-05 NOTE — ED TRIAGE NOTES
Pt CC is hyperglycemia. Pt called EMS because he could tell his blood glucose was elevated. BG for . Pt alert and oriented. Recently homeless.   
Regular diet

## 2024-11-05 NOTE — DISCHARGE INSTRUCTIONS
primary care provider or contact us directly.  Again, THANK YOU for choosing us to care for YOU!

## 2024-11-05 NOTE — ED PROVIDER NOTES
FOUR TIMES DAILY           * This list has 2 medication(s) that are the same as other medications prescribed for you. Read the directions carefully, and ask your doctor or other care provider to review them with you.                ASK your doctor about these medications      aspirin 81 MG EC tablet     atorvastatin 40 MG tablet  Commonly known as: LIPITOR     clopidogrel 75 MG tablet  Commonly known as: PLAVIX     HumuLIN N 100 UNIT/ML injection vial  Generic drug: insulin NPH     hydroCHLOROthiazide 25 MG tablet  Commonly known as: HYDRODIURIL     labetalol 100 MG tablet  Commonly known as: NORMODYNE     losartan 100 MG tablet  Commonly known as: COZAAR     Victoza 18 MG/3ML Sopn SC injection  Generic drug: Liraglutide     vitamin D 50 MCG (2000 UT) Caps capsule  Commonly known as: CHOLECALCIFEROL               Where to Get Your Medications        These medications were sent to [address] [phone] - F [phone]  [address] [place] [id number]      Phone: [phone]   GNP UltiCare Pen Needles 32G X 4 MM Misc  Lantus SoloStar 100 UNIT/ML injection pen  NovoLOG FlexPen 100 UNIT/ML injection pen  OneTouch Delica Plus [id number] Misc  OneTouch Verio strip           DISCONTINUED MEDICATIONS:  Current Discharge Medication List          I am the Primary Clinician of Record: Fawad Littlejohn DO (electronically signed)    (Please note that parts of this dictation were completed with voice recognition software. Quite often unanticipated grammatical, syntax, homophones, and other interpretive errors are inadvertently transcribed by the computer software. Please disregards these errors. Please excuse any errors that have escaped final proofreading.)     Fawad Littlejohn DO  11/05/24 3021

## 2024-12-18 ENCOUNTER — HOSPITAL ENCOUNTER (EMERGENCY)
Facility: HOSPITAL | Age: 59
Discharge: HOME | End: 2024-12-18
Attending: STUDENT IN AN ORGANIZED HEALTH CARE EDUCATION/TRAINING PROGRAM
Payer: MEDICARE

## 2024-12-18 ENCOUNTER — APPOINTMENT (OUTPATIENT)
Facility: HOSPITAL | Age: 59
End: 2024-12-18
Payer: MEDICARE

## 2024-12-18 VITALS
OXYGEN SATURATION: 98 % | TEMPERATURE: 98.2 F | HEIGHT: 69 IN | SYSTOLIC BLOOD PRESSURE: 157 MMHG | WEIGHT: 249 LBS | RESPIRATION RATE: 20 BRPM | BODY MASS INDEX: 36.88 KG/M2 | HEART RATE: 75 BPM | DIASTOLIC BLOOD PRESSURE: 102 MMHG

## 2024-12-18 DIAGNOSIS — S22.41XA CLOSED FRACTURE OF MULTIPLE RIBS OF RIGHT SIDE, INITIAL ENCOUNTER: Primary | ICD-10-CM

## 2024-12-18 PROCEDURE — 96372 THER/PROPH/DIAG INJ SC/IM: CPT

## 2024-12-18 PROCEDURE — 73610 X-RAY EXAM OF ANKLE: CPT

## 2024-12-18 PROCEDURE — 6360000002 HC RX W HCPCS: Performed by: STUDENT IN AN ORGANIZED HEALTH CARE EDUCATION/TRAINING PROGRAM

## 2024-12-18 PROCEDURE — 99284 EMERGENCY DEPT VISIT MOD MDM: CPT

## 2024-12-18 PROCEDURE — 71101 X-RAY EXAM UNILAT RIBS/CHEST: CPT

## 2024-12-18 RX ORDER — MORPHINE SULFATE 4 MG/ML
4 INJECTION, SOLUTION INTRAMUSCULAR; INTRAVENOUS
Status: COMPLETED | OUTPATIENT
Start: 2024-12-18 | End: 2024-12-18

## 2024-12-18 RX ORDER — LIDOCAINE 4 G/G
1 PATCH TOPICAL DAILY
Qty: 30 PATCH | Refills: 0 | Status: SHIPPED | OUTPATIENT
Start: 2024-12-18 | End: 2025-01-17

## 2024-12-18 RX ORDER — OXYCODONE AND ACETAMINOPHEN 5; 325 MG/1; MG/1
1 TABLET ORAL EVERY 6 HOURS PRN
Qty: 12 TABLET | Refills: 0 | Status: SHIPPED | OUTPATIENT
Start: 2024-12-18 | End: 2024-12-21

## 2024-12-18 RX ADMIN — MORPHINE SULFATE 4 MG: 4 INJECTION, SOLUTION INTRAMUSCULAR; INTRAVENOUS at 06:22

## 2024-12-18 ASSESSMENT — PAIN DESCRIPTION - LOCATION
LOCATION: RIB CAGE
LOCATION: RIB CAGE

## 2024-12-18 ASSESSMENT — PAIN DESCRIPTION - ORIENTATION
ORIENTATION: RIGHT
ORIENTATION: RIGHT

## 2024-12-18 ASSESSMENT — PAIN SCALES - GENERAL: PAINLEVEL_OUTOF10: 10

## 2024-12-18 NOTE — ED PROVIDER NOTES
[facility] EMERGENCY DEPT  EMERGENCY DEPARTMENT HISTORY AND PHYSICAL EXAM      Date: 12/18/2024  Patient Name: José Barrera  MRN: [id number]  YOB: 1965  Date of evaluation: 12/18/2024  Provider: Aroldo Morse MD   Note Started: 6:34 AM EST 12/18/24    HISTORY OF PRESENT ILLNESS     Chief Complaint   Patient presents with    Rib Injury       History Provided By: Patient    HPI: José Barrera is a 59 y.o. male presents to the emergency department for right sided rib pain.  Patient states he was in shower this morning when he slipped, fell onto his right side.  Complaining of severe pain to the lateral aspect of ribs.  No head injury, no loss of consciousness, denies any shortness of breath.    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    Arthritis     Atherosclerosis of arteries of extremities (HCC) 07/15/2020    Cataract 07/15/2020    Coronary arteriosclerosis in native artery 07/15/2020    Diabetes mellitus type 2, controlled ([facility]) 07/15/2020    Diabetic mononeuropathy associated with type 2 diabetes mellitus (HCC) 07/15/2020    Diabetic peripheral neuropathy ([facility]) 07/15/2020    EKG, abnormal 07/15/2020    Elevated liver enzymes 07/15/2020    Erectile dysfunction 07/15/2020    Essential hypertension 07/15/2020    Hepatitis     High cholesterol     Hyperlipidemia 07/15/2020    Hypertension     Pain in both lower legs 07/15/2020    Peripheral arterial occlusive disease (HCC) 07/15/2020    Peripheral circulatory disorder associated with type 2 diabetes mellitus ([facility]) 07/15/2020    Spasm of back muscles 07/15/2020    Stroke ([facility])     Ulcer of foot ([facility]) 07/15/2020       Past Surgical History:  Past Surgical History:   Procedure Laterality Date    CARDIAC CATHETERIZATION      CATARACT REMOVAL Bilateral     ILIO-FEMORAL BYPASS GRAFT  11/11/2022    IR TRANSCATH PLACE INTRAVASC STENT OPEN/PERC W OR WO ANGIO INIT VEIN      Right Leg

## 2024-12-18 NOTE — ED TRIAGE NOTES
BIB ems c/o of right sided rib pain after slipping and falling onto the side of the bathtub. Pt c/o of tenderness on palpitation. Denies difficulty breathing.    -LOC, +Blood thinners, Pt did not hit his head.

## 2025-03-19 ENCOUNTER — HOSPITAL ENCOUNTER (EMERGENCY)
Facility: HOSPITAL | Age: 60
Discharge: HOME | End: 2025-03-19
Attending: EMERGENCY MEDICINE
Payer: MEDICARE

## 2025-03-19 ENCOUNTER — APPOINTMENT (OUTPATIENT)
Facility: HOSPITAL | Age: 60
End: 2025-03-19
Payer: MEDICARE

## 2025-03-19 VITALS
BODY MASS INDEX: 35.55 KG/M2 | OXYGEN SATURATION: 94 % | RESPIRATION RATE: 17 BRPM | HEART RATE: 81 BPM | SYSTOLIC BLOOD PRESSURE: 145 MMHG | DIASTOLIC BLOOD PRESSURE: 81 MMHG | WEIGHT: 240 LBS | TEMPERATURE: 97.9 F | HEIGHT: 69 IN

## 2025-03-19 DIAGNOSIS — M43.17 ANTEROLISTHESIS OF LUMBOSACRAL SPINE: Primary | ICD-10-CM

## 2025-03-19 DIAGNOSIS — M54.41 ACUTE RIGHT-SIDED LOW BACK PAIN WITH RIGHT-SIDED SCIATICA: ICD-10-CM

## 2025-03-19 PROCEDURE — 6360000002 HC RX W HCPCS: Performed by: EMERGENCY MEDICINE

## 2025-03-19 PROCEDURE — 72110 X-RAY EXAM L-2 SPINE 4/>VWS: CPT

## 2025-03-19 PROCEDURE — 99284 EMERGENCY DEPT VISIT MOD MDM: CPT

## 2025-03-19 PROCEDURE — 96372 THER/PROPH/DIAG INJ SC/IM: CPT

## 2025-03-19 PROCEDURE — 6370000000 HC RX 637 (ALT 250 FOR IP): Performed by: EMERGENCY MEDICINE

## 2025-03-19 RX ORDER — MORPHINE SULFATE 4 MG/ML
4 INJECTION, SOLUTION INTRAMUSCULAR; INTRAVENOUS
Status: COMPLETED | OUTPATIENT
Start: 2025-03-19 | End: 2025-03-19

## 2025-03-19 RX ORDER — KETOROLAC TROMETHAMINE 10 MG/1
10 TABLET, FILM COATED ORAL
Qty: 15 TABLET | Refills: 0 | Status: SHIPPED | OUTPATIENT
Start: 2025-03-19

## 2025-03-19 RX ORDER — TIZANIDINE 2 MG/1
4 TABLET ORAL ONCE
Status: COMPLETED | OUTPATIENT
Start: 2025-03-19 | End: 2025-03-19

## 2025-03-19 RX ORDER — KETOROLAC TROMETHAMINE 30 MG/ML
30 INJECTION, SOLUTION INTRAMUSCULAR; INTRAVENOUS
Status: COMPLETED | OUTPATIENT
Start: 2025-03-19 | End: 2025-03-19

## 2025-03-19 RX ORDER — LIDOCAINE 4 G/G
1 PATCH TOPICAL DAILY
Qty: 10 PATCH | Refills: 0 | Status: SHIPPED | OUTPATIENT
Start: 2025-03-19 | End: 2025-03-29

## 2025-03-19 RX ORDER — DEXAMETHASONE SODIUM PHOSPHATE 10 MG/ML
10 INJECTION, SOLUTION INTRAMUSCULAR; INTRAVENOUS ONCE
Status: COMPLETED | OUTPATIENT
Start: 2025-03-19 | End: 2025-03-19

## 2025-03-19 RX ORDER — TIZANIDINE 2 MG/1
2 TABLET ORAL EVERY 8 HOURS PRN
Qty: 21 TABLET | Refills: 0 | Status: SHIPPED | OUTPATIENT
Start: 2025-03-19 | End: 2025-03-26

## 2025-03-19 RX ORDER — TRAMADOL HYDROCHLORIDE 50 MG/1
50 TABLET ORAL EVERY 6 HOURS PRN
Qty: 16 TABLET | Refills: 0 | Status: SHIPPED | OUTPATIENT
Start: 2025-03-19 | End: 2025-03-23

## 2025-03-19 RX ADMIN — DEXAMETHASONE SODIUM PHOSPHATE 10 MG: 10 INJECTION INTRAMUSCULAR; INTRAVENOUS at 08:18

## 2025-03-19 RX ADMIN — KETOROLAC TROMETHAMINE 30 MG: 30 INJECTION, SOLUTION INTRAMUSCULAR at 08:20

## 2025-03-19 RX ADMIN — MORPHINE SULFATE 4 MG: 4 INJECTION, SOLUTION INTRAMUSCULAR; INTRAVENOUS at 08:19

## 2025-03-19 RX ADMIN — TIZANIDINE 4 MG: 2 TABLET ORAL at 09:43

## 2025-03-19 ASSESSMENT — PAIN SCALES - GENERAL
PAINLEVEL_OUTOF10: 8
PAINLEVEL_OUTOF10: 10
PAINLEVEL_OUTOF10: 10
PAINLEVEL_OUTOF10: 8

## 2025-03-19 ASSESSMENT — PAIN - FUNCTIONAL ASSESSMENT
PAIN_FUNCTIONAL_ASSESSMENT: 0-10
PAIN_FUNCTIONAL_ASSESSMENT: 0-10

## 2025-03-19 ASSESSMENT — PAIN DESCRIPTION - LOCATION
LOCATION: KNEE;HIP
LOCATION: LEG;HIP

## 2025-03-19 ASSESSMENT — PAIN DESCRIPTION - ORIENTATION
ORIENTATION: RIGHT
ORIENTATION: RIGHT

## 2025-03-19 NOTE — ED NOTES
Discharge  instructions reviewed  with pt and family and both indicated understanding. Pt ambulated out with all belongings and a steady gait.

## 2025-03-19 NOTE — ED PROVIDER NOTES
is new in the interval. Multilevel mild   spondylosis is present..                  Electronically signed by LUL MAC          Non-plain film images such as CT, Ultrasound and MRI are read by the radiologist. Plain radiographic images are visualized and preliminarily interpreted by the emergency physician with the below findings:    Please see the full medical record for comprehensive list of labs and imaging obtained during the visit. All labs and imaging studies were personally reviewed.    Medical Decision Making and ED Course   - I am the first and primary provider for this patient AND AM THE PRIMARY PROVIDER OF RECORD.    - I reviewed the vital signs, available nursing notes, past medical history, past surgical history, family history and social history.    - Initial assessment performed. The patients presenting problems have been discussed, and the staff are in agreement with the care plan formulated and outlined with them.  I have encouraged them to ask questions as they arise throughout their visit.    Vital Signs-Reviewed the patient's vital signs.    Vitals:    03/19/25 0652 03/19/25 0942 03/19/25 0944   BP: (!) 145/81  (!) 145/81   Pulse: 91 76 81   Resp: 17 17 17   Temp: 97.9 °F (36.6 °C) 98.1 °F (36.7 °C) 97.9 °F (36.6 °C)   TempSrc: Oral Oral Oral   SpO2: 94% 97% 94%   Weight: 108.9 kg (240 lb)     Height: 1.753 m (5' 9\")           Xray Interpretations(s): Preliminarily interpreted by me and confirmed by radiologist.  N/A      Nursing notes and pertinent past medical history including imaging and labs have been reviewed (if available)      Provider Notes (Medical Decision Making):     José Barrera is a 59 y.o. male  who  has a past medical history of Arteriosclerotic gangrene (HCC), Arthritis, Atherosclerosis of arteries of extremities, Cataract, Coronary arteriosclerosis in native artery, Diabetes mellitus type 2, controlled (HCC), Diabetic mononeuropathy associated with type 2 diabetes mellitus

## 2025-03-19 NOTE — ED TRIAGE NOTES
Patient presents from home for right leg pain that starts at hip and radiates down to right knee, but not extending past knee. Patient endorses having recently lifted weights in the gym and possibly aggravating it there.

## 2025-03-19 NOTE — DISCHARGE INSTRUCTIONS
Follow-up with primary care doctor.  You can also follow-up with a spine or joint specialist.  I included our specialist on your paperwork.    If your symptoms worsen or you develop difficulty urinating including incontinence of urine, retention of urine, high fevers, chest pain, shortness of breath or any other symptoms please return    Regarding the medications.  I prescribed you a medicine called tramadol which is an opioid medicine.  Reserve this for nighttime use and do not combine it with other sedatives including sleeping medications or anxiety medications or other opioids or alcohol.  Take it at least 3 to 4 hours before or after you take the Zanaflex.  The Zanaflex as a muscle relaxer which could cause some mild sedation as well.  I also prescribed you Toradol which is a stronger NSAID, you are also prescribed meloxicam you should not combine these 2 medications as it can cause stomach upset, gastritis or ulcers.          Thank you for choosing our Emergency Department for your care.  It is our privilege to care for you in your time of need.  In the next several days, you may receive a survey via email or mailed to your home about your experience with our team.  We would greatly appreciate you taking a few minutes to complete the survey, as we use this information to learn what we have done well and what we could be doing better. Thank you for trusting us with your care!    Below you will find a list of your tests from today's visit.   Labs and Radiology Studies  No results found for this or any previous visit (from the past 12 hours).  XR LUMBAR SPINE (MIN 4 VIEWS)  Result Date: 3/19/2025  EXAM: XR LUMBAR SPINE (MIN 4 VIEWS) INDICATION: Right sided LBP COMPARISON: CT 11/18/2022 TECHNIQUE: AP, lateral and spot lateral views of the lumbar spine. FINDINGS: There is grade 1 anterolisthesis of L5 on S1 measuring 6 mm. There is multilevel mild spondylosis throughout the visualized lumbar spine. Multilevel

## 2025-04-22 ENCOUNTER — APPOINTMENT (OUTPATIENT)
Facility: HOSPITAL | Age: 60
End: 2025-04-22
Payer: MEDICARE

## 2025-04-22 ENCOUNTER — HOSPITAL ENCOUNTER (EMERGENCY)
Facility: HOSPITAL | Age: 60
Discharge: HOME | End: 2025-04-22
Attending: FAMILY MEDICINE
Payer: MEDICARE

## 2025-04-22 VITALS
WEIGHT: 250 LBS | HEART RATE: 76 BPM | HEIGHT: 69 IN | SYSTOLIC BLOOD PRESSURE: 168 MMHG | DIASTOLIC BLOOD PRESSURE: 105 MMHG | OXYGEN SATURATION: 98 % | BODY MASS INDEX: 37.03 KG/M2 | RESPIRATION RATE: 18 BRPM

## 2025-04-22 DIAGNOSIS — M54.31 SCIATICA OF RIGHT SIDE: ICD-10-CM

## 2025-04-22 DIAGNOSIS — M25.551 RIGHT HIP PAIN: Primary | ICD-10-CM

## 2025-04-22 DIAGNOSIS — M79.18 BUTTOCK PAIN: ICD-10-CM

## 2025-04-22 PROCEDURE — 96372 THER/PROPH/DIAG INJ SC/IM: CPT

## 2025-04-22 PROCEDURE — 6360000002 HC RX W HCPCS: Performed by: FAMILY MEDICINE

## 2025-04-22 PROCEDURE — 73502 X-RAY EXAM HIP UNI 2-3 VIEWS: CPT

## 2025-04-22 PROCEDURE — 99284 EMERGENCY DEPT VISIT MOD MDM: CPT

## 2025-04-22 RX ORDER — KETOROLAC TROMETHAMINE 30 MG/ML
30 INJECTION, SOLUTION INTRAMUSCULAR; INTRAVENOUS
Status: COMPLETED | OUTPATIENT
Start: 2025-04-22 | End: 2025-04-22

## 2025-04-22 RX ORDER — CYCLOBENZAPRINE HCL 10 MG
10 TABLET ORAL 3 TIMES DAILY PRN
Qty: 15 TABLET | Refills: 0 | Status: SHIPPED | OUTPATIENT
Start: 2025-04-22 | End: 2025-05-02

## 2025-04-22 RX ORDER — PREDNISONE 20 MG/1
40 TABLET ORAL DAILY
Qty: 10 TABLET | Refills: 0 | Status: SHIPPED | OUTPATIENT
Start: 2025-04-22 | End: 2025-04-27

## 2025-04-22 RX ADMIN — KETOROLAC TROMETHAMINE 30 MG: 30 INJECTION, SOLUTION INTRAMUSCULAR at 19:53

## 2025-04-22 ASSESSMENT — PAIN DESCRIPTION - ORIENTATION: ORIENTATION: RIGHT

## 2025-04-22 ASSESSMENT — PAIN DESCRIPTION - LOCATION: LOCATION: HIP

## 2025-04-22 ASSESSMENT — PAIN SCALES - GENERAL: PAINLEVEL_OUTOF10: 10

## 2025-04-22 NOTE — ED TRIAGE NOTES
Right hip pain since last month, was seen at the main and told to followup with spine specialist, but unable to get an appt until 5/22

## 2025-04-23 NOTE — DISCHARGE INSTRUCTIONS
Thank you for choosing our Emergency Department for your care.  It is our privilege to care for you in your time of need.  In the next several days, you may receive a survey via email or mailed to your home about your experience with our team.  We would greatly appreciate you taking a few minutes to complete the survey, as we use this information to learn what we have done well and what we could be doing better. Thank you for trusting us with your care!    Below you will find a list of your tests from today's visit.   Labs and Radiology Studies  No results found for this or any previous visit (from the past 12 hours).  XR HIP 2-3 VW W PELVIS RIGHT  Result Date: 4/22/2025  EXAM: XR HIP 2-3 VW W PELVIS RIGHT INDICATION: Hip pain. COMPARISON: None. FINDINGS: AP view of the pelvis and a frogleg lateral view of the right hip demonstrate no fracture, dislocation or other acute abnormality.     No acute fracture or dislocation.. Electronically signed by JOY CAVAZOS    ------------------------------------------------------------------------------------------------------------  The evaluation and treatment you received in the Emergency Department were for an urgent problem. It is important that you follow-up with a doctor, nurse practitioner, or physician assistant to:  (1) confirm your diagnosis,  (2) re-evaluation of changes in your illness and treatment, and (3) for ongoing care. Please take your discharge instructions with you when you go to your follow-up appointment.     If you have any problem arranging a follow-up appointment, contact us!  If your symptoms become worse or you do not improve as expected, please return to us. We are available 24 hours a day.     If a prescription has been provided, please fill it as soon as possible to prevent a delay in treatment. If you have any questions or reservations about taking the medication due to side effects or interactions with other medications, please call your

## 2025-04-23 NOTE — ED PROVIDER NOTES
EMERGENCY DEPARTMENT HISTORY AND PHYSICAL EXAM      Date: 4/22/2025  Patient Name: José Barrera    History of Presenting Illness     Chief Complaint   Patient presents with    Hip Pain       History Provided By:     HPI: José Barrera, is a very pleasant 59 y.o. male presenting to the ED with a chief complaint of hip pain.  States he has been having ongoing right hip pain.  Pain radiates into buttock and down back of leg.  Worse with sitting.  No numbness tingling or weakness in extremities.  No saddle anesthesia.  No difficulty urinating or stooling.  Denies any injuries.    Denies any other symptoms at this time.    [facility]    No current facility-administered medications on file prior to encounter.     Current Outpatient Medications on File Prior to Encounter   Medication Sig Dispense Refill    ketorolac (TORADOL) 10 MG tablet Take 1 tablet by mouth every 8-12 hours as needed for Pain (Moderate Pain) Don't combine with Mobic/Meloxicam or Ibuprofen or other NSAIDs 15 tablet 0    GNP ULTICARE PEN NEEDLES 32G X 4 MM MISC USE 1 needle SUBCUTANEOUSLY THREE TIMES DAILY TO administer insulin 100 each 0    Lancets (ONETOUCH DELICA PLUS [id number]) MISC use to check blood sugar FOUR TIMES DAILY 200 each 5    LANTUS SOLOSTAR 100 UNIT/ML injection pen INJECT 60 UNITS SUBCUTANEOUSLY EVERY NIGHT (50 DAY supply) 5 Adjustable Dose Pre-filled Pen Syringe 0    NOVOLOG FLEXPEN 100 UNIT/ML injection pen Inject 10 unit subcutaneously before each meal. (50 DAY supply) 5 Adjustable Dose Pre-filled Pen Syringe 0    ONETOUCH VERIO strip As needed.USE TO CHECK BLOOD SUGAR THREE TIMES DAILY AS DIRECTED 100 each 0    aspirin 81 MG EC tablet Take 1 tablet by mouth daily      Cholecalciferol (VITAMIN D) 50 MCG (2000 UT) CAPS capsule Take 1 capsule by mouth once a week      HUMULIN N 100 UNIT/ML injection vial INJECT 10 UNITS SUBCUTANEOUSLY ONCE DAILY AND INJECT per sliding scale THREE TIMES DAILY

## 2025-05-25 ENCOUNTER — HOSPITAL ENCOUNTER (EMERGENCY)
Facility: HOSPITAL | Age: 60
Discharge: HOME | End: 2025-05-25
Payer: MEDICARE

## 2025-05-25 VITALS
TEMPERATURE: 97.5 F | BODY MASS INDEX: 37.03 KG/M2 | SYSTOLIC BLOOD PRESSURE: 100 MMHG | HEART RATE: 79 BPM | OXYGEN SATURATION: 98 % | HEIGHT: 69 IN | RESPIRATION RATE: 16 BRPM | WEIGHT: 250 LBS | DIASTOLIC BLOOD PRESSURE: 65 MMHG

## 2025-05-25 DIAGNOSIS — M54.31 SCIATICA OF RIGHT SIDE: Primary | ICD-10-CM

## 2025-05-25 PROCEDURE — 99284 EMERGENCY DEPT VISIT MOD MDM: CPT

## 2025-05-25 PROCEDURE — 96372 THER/PROPH/DIAG INJ SC/IM: CPT

## 2025-05-25 PROCEDURE — 6370000000 HC RX 637 (ALT 250 FOR IP)

## 2025-05-25 PROCEDURE — 6360000002 HC RX W HCPCS

## 2025-05-25 RX ORDER — PREDNISONE 20 MG/1
40 TABLET ORAL
Status: DISCONTINUED | OUTPATIENT
Start: 2025-05-25 | End: 2025-05-25

## 2025-05-25 RX ORDER — KETOROLAC TROMETHAMINE 30 MG/ML
30 INJECTION, SOLUTION INTRAMUSCULAR; INTRAVENOUS
Status: COMPLETED | OUTPATIENT
Start: 2025-05-25 | End: 2025-05-25

## 2025-05-25 RX ORDER — PREDNISONE 20 MG/1
40 TABLET ORAL DAILY
Qty: 8 TABLET | Refills: 0 | Status: SHIPPED | OUTPATIENT
Start: 2025-05-25 | End: 2025-05-29

## 2025-05-25 RX ORDER — PREDNISONE 20 MG/1
40 TABLET ORAL
Status: COMPLETED | OUTPATIENT
Start: 2025-05-25 | End: 2025-05-25

## 2025-05-25 RX ADMIN — KETOROLAC TROMETHAMINE 30 MG: 30 INJECTION, SOLUTION INTRAMUSCULAR at 11:41

## 2025-05-25 RX ADMIN — PREDNISONE 40 MG: 20 TABLET ORAL at 11:43

## 2025-05-25 ASSESSMENT — LIFESTYLE VARIABLES
HOW OFTEN DO YOU HAVE A DRINK CONTAINING ALCOHOL: MONTHLY OR LESS
HOW MANY STANDARD DRINKS CONTAINING ALCOHOL DO YOU HAVE ON A TYPICAL DAY: 1 OR 2

## 2025-05-25 ASSESSMENT — PAIN SCALES - GENERAL: PAINLEVEL_OUTOF10: 10

## 2025-05-25 ASSESSMENT — PAIN - FUNCTIONAL ASSESSMENT: PAIN_FUNCTIONAL_ASSESSMENT: 0-10

## 2025-05-25 NOTE — ED TRIAGE NOTES
Pt reports right lower back pain radiating down to right leg for the last 2-3 weeks. Awaiting to hear back about for MRI results from Monday.     Pt took gabapentin and tylenol @ 0700.     Pt denies any lightheadedness or dizziness in triage.

## 2025-05-25 NOTE — ED PROVIDER NOTES
[facility]  EMERGENCY DEPARTMENT HISTORY AND PHYSICAL EXAM      Date: 5/25/2025  Patient Name: José Barrera  MRN: [id number]  Birthdate 1965  Date of evaluation: 5/25/2025  Provider: Alejandrina Kolb PA-C   Note Started: 11:44 AM EDT 5/25/25    HISTORY OF PRESENT ILLNESS     Chief Complaint   Patient presents with    Hip Pain       History Provided By: Patient    HPI: José Barrera is a 59 y.o. male with past medical history listed below, presents for evaluation of right sided low back pain radiating down right leg.  Patient states that this is no different from his previous symptoms, he has tried taking his medication at home without improvement.  He denies any new injury, falls or trauma.  He is waiting the results of the outpatient MRI and follows up with his orthopedic doctor in 2 days.  He denies any fever, chills, saddle anesthesia, bowel or bladder incontinence, abdominal pain, chest pain, shortness of breath or difficulty breathing.    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    Arthritis     Atherosclerosis of arteries of extremities 07/15/2020    Cataract 07/15/2020    Coronary arteriosclerosis in native artery 07/15/2020    Diabetes mellitus type 2, controlled ([facility]) 07/15/2020    Diabetic mononeuropathy associated with type 2 diabetes mellitus (HCC) 07/15/2020    Diabetic peripheral neuropathy ([facility]) 07/15/2020    EKG, abnormal 07/15/2020    Elevated liver enzymes 07/15/2020    Erectile dysfunction 07/15/2020    Essential hypertension 07/15/2020    Hepatitis     High cholesterol     Hyperlipidemia 07/15/2020    Hypertension     Pain in both lower legs 07/15/2020    Peripheral arterial occlusive disease 07/15/2020    Peripheral circulatory disorder associated with type 2 diabetes mellitus (HCC) 07/15/2020    Spasm of back muscles 07/15/2020    Stroke ([facility])     Ulcer of foot (HCC) 07/15/2020

## 2025-08-14 ENCOUNTER — HOSPITAL ENCOUNTER (EMERGENCY)
Facility: HOSPITAL | Age: 60
Discharge: HOME | End: 2025-08-14
Attending: FAMILY MEDICINE
Payer: MEDICARE

## 2025-08-14 VITALS
DIASTOLIC BLOOD PRESSURE: 103 MMHG | TEMPERATURE: 98.4 F | HEIGHT: 69 IN | RESPIRATION RATE: 16 BRPM | BODY MASS INDEX: 38.51 KG/M2 | HEART RATE: 80 BPM | SYSTOLIC BLOOD PRESSURE: 160 MMHG | OXYGEN SATURATION: 99 % | WEIGHT: 260 LBS

## 2025-08-14 DIAGNOSIS — J02.9 ACUTE PHARYNGITIS, UNSPECIFIED ETIOLOGY: Primary | ICD-10-CM

## 2025-08-14 PROCEDURE — 99283 EMERGENCY DEPT VISIT LOW MDM: CPT

## 2025-08-14 RX ORDER — SENNOSIDES 8.6 MG
650 CAPSULE ORAL EVERY 8 HOURS PRN
Qty: 12 TABLET | Refills: 0 | Status: SHIPPED | OUTPATIENT
Start: 2025-08-14 | End: 2025-08-18

## 2025-08-14 RX ORDER — AMOXICILLIN 500 MG/1
500 TABLET, FILM COATED ORAL 2 TIMES DAILY
Qty: 20 TABLET | Refills: 0 | Status: SHIPPED | OUTPATIENT
Start: 2025-08-14 | End: 2025-08-24

## (undated) DEVICE — Device

## (undated) DEVICE — GUIDEWIRE VASC L260CM DIA0.035IN TIP L3MM STD EXCHG PTFE J

## (undated) DEVICE — APPLICATOR MEDICATED 26 CC SOLUTION HI LT ORNG CHLORAPREP

## (undated) DEVICE — CATHETER URETH 14FR BLLN 5CC SIL F INDWL 2 W SHT LEN STD

## (undated) DEVICE — SKW DEEP SCROTAL RETRACTION SYSTEM

## (undated) DEVICE — WET SKIN PREP TRAY: Brand: MEDLINE INDUSTRIES, INC.

## (undated) DEVICE — BAND COMPR L24CM REG CLR PLAS HEMSTAT EXT HK AND LOOP RETEN

## (undated) DEVICE — DRAIN SURG L18IN DIA025IN 100% SIL RADPQ FOR CLS WND DRNAGE

## (undated) DEVICE — 3M™ STERI-DRAPE™ SMALL DRAPE WITH ADHESIVE APERTURE 1092 25/BX,4/CS&#X20;: Brand: STERI-DRAPE™

## (undated) DEVICE — SUTURE MCRYL + SZ 4-0 L27IN ABSRB UD L19MM PS-2 3/8 CIR MCP426H

## (undated) DEVICE — FLEXIBLE YANKAUER,LARGE TIP, NO VACUUM CONTROL: Brand: ARGYLE

## (undated) DEVICE — PENCIL ES CRD L10FT HND SWCHING ROCK SWCH W/ EDGE COAT BLDE

## (undated) DEVICE — YANKAUER,POOLE TIP,STERILE,50/CS: Brand: MEDLINE

## (undated) DEVICE — ADAPTER IV TBNG CLR POLYCARB DBL M LUERLOCK

## (undated) DEVICE — SYRINGE MED 10ML LUERLOCK TIP W/O SFTY DISP

## (undated) DEVICE — 3M™ STERI-DRAPE™ U-DRAPE 1015: Brand: STERI-DRAPE™

## (undated) DEVICE — 3M™ TEGADERM™ TRANSPARENT FILM DRESSING FRAME STYLE, 1626W, 4 IN X 4-3/4 IN (10 CM X 12 CM), 50/CT 4CT/CASE: Brand: 3M™ TEGADERM™

## (undated) DEVICE — GLOVE ORANGE PI 7 1/2   MSG9075

## (undated) DEVICE — DRAPE,U/ SHT,SPLIT,PLAS,STERIL: Brand: MEDLINE

## (undated) DEVICE — CONTAINER,SPECIMEN,PNEU TUBE,4OZ,OR STRL: Brand: MEDLINE

## (undated) DEVICE — LIQUIBAND RAPID ADHESIVE 36/CS 0.8ML: Brand: MEDLINE

## (undated) DEVICE — THE STERILE LIGHT HANDLE COVER IS USED WITH STERIS SURGICAL LIGHTING AND VISUALIZATION SYSTEMS.

## (undated) DEVICE — TIP SUCTION FLANGE YANKAUER FLX NO VENT FN CAP STRL

## (undated) DEVICE — HYPODERMIC SAFETY NEEDLE: Brand: MONOJECT

## (undated) DEVICE — DRAIN SURG W10MMXL20CM SIL FULL PERF HUBLESS FLAT RADPQ

## (undated) DEVICE — SUTURE MCRYL + SZ 3-0 L27IN ABSRB UD L26MM SH 1/2 CIR MCP416H

## (undated) DEVICE — 1LYRTR 16FR10ML 100%SILI SNAP: Brand: MEDLINE INDUSTRIES, INC.

## (undated) DEVICE — BANDAGE,GAUZE,BULKEE II,4.5"X4.1YD,STRL: Brand: MEDLINE

## (undated) DEVICE — SOLUTION IRRIG 1000ML 0.9% SOD CHL USP POUR PLAS BTL

## (undated) DEVICE — GOWN,ECLIPSE,POLYRNF,W/TWL,L,30/CS: Brand: MEDLINE

## (undated) DEVICE — BOWL MED M 16OZ PLAS CAP GRAD

## (undated) DEVICE — DRAPE,REIN 53X77,STERILE: Brand: MEDLINE

## (undated) DEVICE — SUTURE VCRL SZ 3-0 L27IN ABSRB UD L26MM SH 1/2 CIR J416H

## (undated) DEVICE — GARMENT,MEDLINE,DVT,INT,CALF,MED, GEN2: Brand: MEDLINE

## (undated) DEVICE — ELECTRODE PT RET AD L9FT HI MOIST COND ADH HYDRGEL CORDED

## (undated) DEVICE — SOLUTION IV 1000ML 0.9% SOD CHL PH 5 INJ USP VIAFLX PLAS

## (undated) DEVICE — SOUTHSIDE TURNOVER: Brand: MEDLINE INDUSTRIES, INC.

## (undated) DEVICE — PLUG CATH F UNIV ENDCAP FOR OCCL DRNGE LUMN DISP

## (undated) DEVICE — SUTURE PDS II SZ 2-0 L27IN ABSRB VLT L36MM CT-1 1/2 CIR Z339H

## (undated) DEVICE — SYRINGE MED 10ML PUR GAM COMPATIBLE POLYCARB FIX M LUER CONN

## (undated) DEVICE — CONTAINER,SPECIMEN,PNEU TUBE,3OZ,OR STRL: Brand: MEDLINE

## (undated) DEVICE — SYRINGE MED 50ML LUERLOCK TIP

## (undated) DEVICE — SUTURE VCRL + SZ 3-0 L27IN ABSRB UD L26MM SH 1/2 CIR VCP416H

## (undated) DEVICE — DECANTER BAG 9": Brand: MEDLINE INDUSTRIES, INC.

## (undated) DEVICE — GLOVE ORANGE PI 7   MSG9070

## (undated) DEVICE — TOWEL SURG W17XL27IN STD BLU COT NONFENESTRATED PREWASHED

## (undated) DEVICE — CATH 5F 100CM JR40 -- DXTERITY

## (undated) DEVICE — MINOR GENERAL PACK: Brand: MEDLINE INDUSTRIES, INC.

## (undated) DEVICE — GLOVE SURG SZ 75 L12IN FNGR THK79MIL GRN LTX FREE

## (undated) DEVICE — SYRINGE ANGIO 10 CC POLYCARB DK GRN MEDALLION DISP

## (undated) DEVICE — Device: Brand: JELCO

## (undated) DEVICE — CATHETER ANGIO 5FR L100CM GRY S STL NYL JL3.5 3 SEG BRAID L

## (undated) DEVICE — GLIDESHEATH SLENDER STAINLESS STEEL KIT: Brand: GLIDESHEATH SLENDER

## (undated) DEVICE — SURGICAL PROCEDURE TRAY CRD CATH 3 PRT

## (undated) DEVICE — RESERVOIR,SUCTION,100CC,SILICONE: Brand: MEDLINE

## (undated) DEVICE — SYRINGE 20ML LL S/C 50